# Patient Record
Sex: MALE | Race: WHITE | NOT HISPANIC OR LATINO | Employment: FULL TIME | URBAN - METROPOLITAN AREA
[De-identification: names, ages, dates, MRNs, and addresses within clinical notes are randomized per-mention and may not be internally consistent; named-entity substitution may affect disease eponyms.]

---

## 2017-01-13 ENCOUNTER — GENERIC CONVERSION - ENCOUNTER (OUTPATIENT)
Dept: OTHER | Facility: OTHER | Age: 52
End: 2017-01-13

## 2017-01-20 ENCOUNTER — ALLSCRIPTS OFFICE VISIT (OUTPATIENT)
Dept: OTHER | Facility: OTHER | Age: 52
End: 2017-01-20

## 2017-01-24 ENCOUNTER — GENERIC CONVERSION - ENCOUNTER (OUTPATIENT)
Dept: OTHER | Facility: OTHER | Age: 52
End: 2017-01-24

## 2017-01-24 ENCOUNTER — LAB CONVERSION - ENCOUNTER (OUTPATIENT)
Dept: OTHER | Facility: OTHER | Age: 52
End: 2017-01-24

## 2017-01-24 LAB
A/G RATIO (HISTORICAL): 1.7 (CALC) (ref 1–2.5)
ALBUMIN SERPL BCP-MCNC: 4.4 G/DL (ref 3.6–5.1)
ALDOLASE (HISTORICAL): 5.3 U/L
ALP SERPL-CCNC: 60 U/L (ref 40–115)
ALT SERPL W P-5'-P-CCNC: 37 U/L (ref 9–46)
AST SERPL W P-5'-P-CCNC: 24 U/L (ref 10–35)
BILIRUB SERPL-MCNC: 0.7 MG/DL (ref 0.2–1.2)
BUN SERPL-MCNC: 20 MG/DL (ref 7–25)
BUN/CREA RATIO (HISTORICAL): ABNORMAL (CALC) (ref 6–22)
CALCIUM SERPL-MCNC: 9.2 MG/DL (ref 8.6–10.3)
CHLORIDE SERPL-SCNC: 102 MMOL/L (ref 98–110)
CHOLEST SERPL-MCNC: 144 MG/DL (ref 125–200)
CHOLEST/HDLC SERPL: 4.8 (CALC)
CK SERPL-CCNC: 125 U/L (ref 44–196)
CO2 SERPL-SCNC: 27 MMOL/L (ref 20–31)
CREAT SERPL-MCNC: 1.28 MG/DL (ref 0.7–1.33)
EGFR AFRICAN AMERICAN (HISTORICAL): 75 ML/MIN/1.73M2
EGFR-AMERICAN CALC (HISTORICAL): 65 ML/MIN/1.73M2
GAMMA GLOBULIN (HISTORICAL): 2.6 G/DL (CALC) (ref 1.9–3.7)
GLUCOSE (HISTORICAL): 134 MG/DL (ref 65–99)
HBA1C MFR BLD HPLC: 7.8 % OF TOTAL HGB
HDLC SERPL-MCNC: 30 MG/DL
LDL CHOLESTEROL (HISTORICAL): 96 MG/DL (CALC)
NON-HDL-CHOL (CHOL-HDL) (HISTORICAL): 114 MG/DL (CALC)
POTASSIUM SERPL-SCNC: 4 MMOL/L (ref 3.5–5.3)
SODIUM SERPL-SCNC: 139 MMOL/L (ref 135–146)
TOTAL PROTEIN (HISTORICAL): 7 G/DL (ref 6.1–8.1)
TRIGL SERPL-MCNC: 92 MG/DL
TSH SERPL DL<=0.05 MIU/L-ACNC: 5.84 MIU/L (ref 0.4–4.5)

## 2017-01-25 ENCOUNTER — GENERIC CONVERSION - ENCOUNTER (OUTPATIENT)
Dept: OTHER | Facility: OTHER | Age: 52
End: 2017-01-25

## 2017-02-14 ENCOUNTER — ALLSCRIPTS OFFICE VISIT (OUTPATIENT)
Dept: OTHER | Facility: OTHER | Age: 52
End: 2017-02-14

## 2017-02-14 DIAGNOSIS — E04.1 NONTOXIC SINGLE THYROID NODULE: ICD-10-CM

## 2017-03-06 ENCOUNTER — HOSPITAL ENCOUNTER (OUTPATIENT)
Dept: RADIOLOGY | Facility: HOSPITAL | Age: 52
Discharge: HOME | End: 2017-03-06
Payer: COMMERCIAL

## 2017-03-06 DIAGNOSIS — E04.1 NONTOXIC SINGLE THYROID NODULE: ICD-10-CM

## 2017-03-06 PROCEDURE — 76536 US EXAM OF HEAD AND NECK: CPT

## 2017-03-07 ENCOUNTER — GENERIC CONVERSION - ENCOUNTER (OUTPATIENT)
Dept: OTHER | Facility: OTHER | Age: 52
End: 2017-03-07

## 2017-03-17 ENCOUNTER — GENERIC CONVERSION - ENCOUNTER (OUTPATIENT)
Dept: OTHER | Facility: OTHER | Age: 52
End: 2017-03-17

## 2017-03-17 LAB — TSH SERPL DL<=0.05 MIU/L-ACNC: 1.21 MIU/L (ref 0.4–4.5)

## 2017-04-06 ENCOUNTER — ALLSCRIPTS OFFICE VISIT (OUTPATIENT)
Dept: OTHER | Facility: OTHER | Age: 52
End: 2017-04-06

## 2017-04-06 DIAGNOSIS — E11.65 TYPE 2 DIABETES MELLITUS WITH HYPERGLYCEMIA (HCC): ICD-10-CM

## 2017-04-10 ENCOUNTER — GENERIC CONVERSION - ENCOUNTER (OUTPATIENT)
Dept: OTHER | Facility: OTHER | Age: 52
End: 2017-04-10

## 2017-04-20 ENCOUNTER — GENERIC CONVERSION - ENCOUNTER (OUTPATIENT)
Dept: OTHER | Facility: OTHER | Age: 52
End: 2017-04-20

## 2017-05-14 DIAGNOSIS — E11.65 TYPE 2 DIABETES MELLITUS WITH HYPERGLYCEMIA (HCC): ICD-10-CM

## 2017-05-17 ENCOUNTER — GENERIC CONVERSION - ENCOUNTER (OUTPATIENT)
Dept: OTHER | Facility: OTHER | Age: 52
End: 2017-05-17

## 2017-05-24 ENCOUNTER — ALLSCRIPTS OFFICE VISIT (OUTPATIENT)
Dept: OTHER | Facility: OTHER | Age: 52
End: 2017-05-24

## 2017-05-26 ENCOUNTER — ALLSCRIPTS OFFICE VISIT (OUTPATIENT)
Dept: OTHER | Facility: OTHER | Age: 52
End: 2017-05-26

## 2017-05-30 ENCOUNTER — LAB CONVERSION - ENCOUNTER (OUTPATIENT)
Dept: OTHER | Facility: OTHER | Age: 52
End: 2017-05-30

## 2017-05-30 ENCOUNTER — GENERIC CONVERSION - ENCOUNTER (OUTPATIENT)
Dept: OTHER | Facility: OTHER | Age: 52
End: 2017-05-30

## 2017-05-30 LAB
A/G RATIO (HISTORICAL): 1.9 (CALC) (ref 1–2.5)
ALBUMIN SERPL BCP-MCNC: 4.5 G/DL (ref 3.6–5.1)
ALP SERPL-CCNC: 73 U/L (ref 40–115)
ALT SERPL W P-5'-P-CCNC: 30 U/L (ref 9–46)
AST SERPL W P-5'-P-CCNC: 20 U/L (ref 10–35)
BILIRUB SERPL-MCNC: 0.5 MG/DL (ref 0.2–1.2)
BUN SERPL-MCNC: 15 MG/DL (ref 7–25)
BUN/CREA RATIO (HISTORICAL): ABNORMAL (CALC) (ref 6–22)
CALCIUM SERPL-MCNC: 9.4 MG/DL (ref 8.6–10.3)
CHLORIDE SERPL-SCNC: 104 MMOL/L (ref 98–110)
CHOLEST SERPL-MCNC: 132 MG/DL (ref 125–200)
CHOLEST/HDLC SERPL: 3.7 (CALC)
CO2 SERPL-SCNC: 29 MMOL/L (ref 20–31)
CREAT SERPL-MCNC: 1.03 MG/DL (ref 0.7–1.33)
CREATININE, RANDOM URINE (HISTORICAL): 128 MG/DL (ref 20–370)
EGFR AFRICAN AMERICAN (HISTORICAL): 98 ML/MIN/1.73M2
EGFR-AMERICAN CALC (HISTORICAL): 84 ML/MIN/1.73M2
GAMMA GLOBULIN (HISTORICAL): 2.4 G/DL (CALC) (ref 1.9–3.7)
GLUCOSE (HISTORICAL): 112 MG/DL (ref 65–99)
HBA1C MFR BLD HPLC: 5.7 % OF TOTAL HGB
HDLC SERPL-MCNC: 36 MG/DL
LDL CHOLESTEROL (HISTORICAL): 85 MG/DL (CALC)
MAGNESIUM, UR (HISTORICAL): 0.3 MG/DL
MICROALBUMIN/CREATININE RATIO (HISTORICAL): 2 MCG/MG CREAT
NON-HDL-CHOL (CHOL-HDL) (HISTORICAL): 96 MG/DL (CALC)
POTASSIUM SERPL-SCNC: 4 MMOL/L (ref 3.5–5.3)
SODIUM SERPL-SCNC: 141 MMOL/L (ref 135–146)
TOTAL PROTEIN (HISTORICAL): 6.9 G/DL (ref 6.1–8.1)
TRIGL SERPL-MCNC: 54 MG/DL

## 2017-06-05 ENCOUNTER — GENERIC CONVERSION - ENCOUNTER (OUTPATIENT)
Dept: OTHER | Facility: OTHER | Age: 52
End: 2017-06-05

## 2017-07-06 ENCOUNTER — ALLSCRIPTS OFFICE VISIT (OUTPATIENT)
Dept: OTHER | Facility: OTHER | Age: 52
End: 2017-07-06

## 2017-07-06 DIAGNOSIS — E89.0 POSTPROCEDURAL HYPOTHYROIDISM: ICD-10-CM

## 2017-07-19 ENCOUNTER — GENERIC CONVERSION - ENCOUNTER (OUTPATIENT)
Dept: OTHER | Facility: OTHER | Age: 52
End: 2017-07-19

## 2017-07-19 ENCOUNTER — ANESTHESIA EVENT (OUTPATIENT)
Dept: GASTROENTEROLOGY | Facility: HOSPITAL | Age: 52
End: 2017-07-19
Payer: COMMERCIAL

## 2017-07-19 ENCOUNTER — ANESTHESIA (OUTPATIENT)
Dept: GASTROENTEROLOGY | Facility: HOSPITAL | Age: 52
End: 2017-07-19
Payer: COMMERCIAL

## 2017-07-19 ENCOUNTER — HOSPITAL ENCOUNTER (OUTPATIENT)
Facility: HOSPITAL | Age: 52
Setting detail: OUTPATIENT SURGERY
Discharge: HOME | End: 2017-07-19
Attending: INTERNAL MEDICINE | Admitting: INTERNAL MEDICINE
Payer: COMMERCIAL

## 2017-07-19 VITALS
HEIGHT: 74 IN | OXYGEN SATURATION: 92 % | TEMPERATURE: 97.5 F | WEIGHT: 259 LBS | DIASTOLIC BLOOD PRESSURE: 66 MMHG | SYSTOLIC BLOOD PRESSURE: 109 MMHG | BODY MASS INDEX: 33.24 KG/M2 | RESPIRATION RATE: 18 BRPM | HEART RATE: 68 BPM

## 2017-07-19 DIAGNOSIS — Z12.11 ENCOUNTER FOR SCREENING FOR MALIGNANT NEOPLASM OF COLON: ICD-10-CM

## 2017-07-19 PROCEDURE — 88305 TISSUE EXAM BY PATHOLOGIST: CPT | Performed by: INTERNAL MEDICINE

## 2017-07-19 RX ORDER — PROPOFOL 10 MG/ML
INJECTION, EMULSION INTRAVENOUS AS NEEDED
Status: DISCONTINUED | OUTPATIENT
Start: 2017-07-19 | End: 2017-07-19 | Stop reason: SURG

## 2017-07-19 RX ORDER — GABAPENTIN 100 MG/1
200 CAPSULE ORAL 3 TIMES DAILY
COMMUNITY
End: 2018-03-10 | Stop reason: SDUPTHER

## 2017-07-19 RX ORDER — SODIUM CHLORIDE 9 MG/ML
INJECTION, SOLUTION INTRAVENOUS CONTINUOUS PRN
Status: DISCONTINUED | OUTPATIENT
Start: 2017-07-19 | End: 2017-07-19 | Stop reason: SURG

## 2017-07-19 RX ORDER — ROSUVASTATIN CALCIUM 5 MG/1
5 TABLET, COATED ORAL DAILY
COMMUNITY
End: 2018-02-21 | Stop reason: SDUPTHER

## 2017-07-19 RX ORDER — RIZATRIPTAN BENZOATE 10 MG/1
10 TABLET ORAL ONCE AS NEEDED
COMMUNITY
End: 2020-02-12 | Stop reason: SDUPTHER

## 2017-07-19 RX ORDER — LEVOTHYROXINE SODIUM 0.12 MG/1
125 TABLET ORAL DAILY
COMMUNITY
End: 2018-02-21 | Stop reason: SDUPTHER

## 2017-07-19 RX ORDER — LISINOPRIL 20 MG/1
20 TABLET ORAL DAILY
COMMUNITY
End: 2018-05-09 | Stop reason: SDUPTHER

## 2017-07-19 RX ADMIN — PROPOFOL 30 MG: 10 INJECTION, EMULSION INTRAVENOUS at 08:23

## 2017-07-19 RX ADMIN — PROPOFOL 50 MG: 10 INJECTION, EMULSION INTRAVENOUS at 08:18

## 2017-07-19 RX ADMIN — SODIUM CHLORIDE: 0.9 INJECTION, SOLUTION INTRAVENOUS at 07:27

## 2017-07-19 RX ADMIN — PROPOFOL 30 MG: 10 INJECTION, EMULSION INTRAVENOUS at 08:16

## 2017-07-19 RX ADMIN — PROPOFOL 30 MG: 10 INJECTION, EMULSION INTRAVENOUS at 08:25

## 2017-07-19 RX ADMIN — PROPOFOL 100 MG: 10 INJECTION, EMULSION INTRAVENOUS at 08:15

## 2017-07-19 RX ADMIN — PROPOFOL 50 MG: 10 INJECTION, EMULSION INTRAVENOUS at 08:20

## 2017-07-25 ENCOUNTER — GENERIC CONVERSION - ENCOUNTER (OUTPATIENT)
Dept: OTHER | Facility: OTHER | Age: 52
End: 2017-07-25

## 2017-08-14 ENCOUNTER — ALLSCRIPTS OFFICE VISIT (OUTPATIENT)
Dept: OTHER | Facility: OTHER | Age: 52
End: 2017-08-14

## 2017-08-14 DIAGNOSIS — Z12.5 ENCOUNTER FOR SCREENING FOR MALIGNANT NEOPLASM OF PROSTATE: ICD-10-CM

## 2017-08-14 DIAGNOSIS — R73.9 HYPERGLYCEMIA: ICD-10-CM

## 2017-08-14 DIAGNOSIS — E89.0 POSTPROCEDURAL HYPOTHYROIDISM: ICD-10-CM

## 2017-08-14 DIAGNOSIS — E11.65 TYPE 2 DIABETES MELLITUS WITH HYPERGLYCEMIA (HCC): ICD-10-CM

## 2017-08-14 DIAGNOSIS — I10 ESSENTIAL (PRIMARY) HYPERTENSION: ICD-10-CM

## 2017-08-14 DIAGNOSIS — E78.5 HYPERLIPIDEMIA: ICD-10-CM

## 2017-08-16 ENCOUNTER — LAB CONVERSION - ENCOUNTER (OUTPATIENT)
Dept: OTHER | Facility: OTHER | Age: 52
End: 2017-08-16

## 2017-08-16 ENCOUNTER — GENERIC CONVERSION - ENCOUNTER (OUTPATIENT)
Dept: OTHER | Facility: OTHER | Age: 52
End: 2017-08-16

## 2017-08-16 LAB
A/G RATIO (HISTORICAL): 2 (CALC) (ref 1–2.5)
ALBUMIN SERPL BCP-MCNC: 4.4 G/DL (ref 3.6–5.1)
ALP SERPL-CCNC: 63 U/L (ref 40–115)
ALT SERPL W P-5'-P-CCNC: 31 U/L (ref 9–46)
AST SERPL W P-5'-P-CCNC: 22 U/L (ref 10–35)
BILIRUB SERPL-MCNC: 0.6 MG/DL (ref 0.2–1.2)
BUN SERPL-MCNC: 15 MG/DL (ref 7–25)
BUN/CREA RATIO (HISTORICAL): ABNORMAL (CALC) (ref 6–22)
CALCIUM SERPL-MCNC: 9.5 MG/DL (ref 8.6–10.3)
CHLORIDE SERPL-SCNC: 101 MMOL/L (ref 98–110)
CHOLEST SERPL-MCNC: 143 MG/DL (ref 125–200)
CHOLEST/HDLC SERPL: 4 (CALC)
CO2 SERPL-SCNC: 32 MMOL/L (ref 20–31)
CREAT SERPL-MCNC: 1.1 MG/DL (ref 0.7–1.33)
EGFR AFRICAN AMERICAN (HISTORICAL): 90 ML/MIN/1.73M2
EGFR-AMERICAN CALC (HISTORICAL): 77 ML/MIN/1.73M2
GAMMA GLOBULIN (HISTORICAL): 2.2 G/DL (CALC) (ref 1.9–3.7)
GLUCOSE (HISTORICAL): 103 MG/DL (ref 65–99)
HBA1C MFR BLD HPLC: 5.6 % OF TOTAL HGB
HDLC SERPL-MCNC: 36 MG/DL
LDL CHOLESTEROL (HISTORICAL): 86 MG/DL (CALC)
NON-HDL-CHOL (CHOL-HDL) (HISTORICAL): 107 MG/DL (CALC)
POTASSIUM SERPL-SCNC: 4 MMOL/L (ref 3.5–5.3)
PROSTATE SPECIFIC ANTIGEN TOTAL (HISTORICAL): 1.2 NG/ML
SODIUM SERPL-SCNC: 140 MMOL/L (ref 135–146)
TOTAL PROTEIN (HISTORICAL): 6.6 G/DL (ref 6.1–8.1)
TRIGL SERPL-MCNC: 105 MG/DL

## 2017-08-28 DIAGNOSIS — E11.65 TYPE 2 DIABETES MELLITUS WITH HYPERGLYCEMIA (HCC): ICD-10-CM

## 2017-09-27 ENCOUNTER — ALLSCRIPTS OFFICE VISIT (OUTPATIENT)
Dept: OTHER | Facility: OTHER | Age: 52
End: 2017-09-27

## 2017-09-28 ENCOUNTER — GENERIC CONVERSION - ENCOUNTER (OUTPATIENT)
Dept: OTHER | Facility: OTHER | Age: 52
End: 2017-09-28

## 2017-09-28 DIAGNOSIS — I10 ESSENTIAL (PRIMARY) HYPERTENSION: ICD-10-CM

## 2017-09-28 DIAGNOSIS — E78.5 HYPERLIPIDEMIA: ICD-10-CM

## 2017-09-28 DIAGNOSIS — R73.9 HYPERGLYCEMIA: ICD-10-CM

## 2017-09-28 DIAGNOSIS — E11.65 TYPE 2 DIABETES MELLITUS WITH HYPERGLYCEMIA (HCC): ICD-10-CM

## 2017-10-10 ENCOUNTER — ALLSCRIPTS OFFICE VISIT (OUTPATIENT)
Dept: OTHER | Facility: OTHER | Age: 52
End: 2017-10-10

## 2017-10-17 ENCOUNTER — LAB CONVERSION - ENCOUNTER (OUTPATIENT)
Dept: OTHER | Facility: OTHER | Age: 52
End: 2017-10-17

## 2017-10-17 ENCOUNTER — GENERIC CONVERSION - ENCOUNTER (OUTPATIENT)
Dept: OTHER | Facility: OTHER | Age: 52
End: 2017-10-17

## 2017-10-17 LAB
A/G RATIO (HISTORICAL): 1.9 (CALC) (ref 1–2.5)
ALBUMIN SERPL BCP-MCNC: 4.4 G/DL (ref 3.6–5.1)
ALP SERPL-CCNC: 59 U/L (ref 40–115)
ALT SERPL W P-5'-P-CCNC: 22 U/L (ref 9–46)
AST SERPL W P-5'-P-CCNC: 15 U/L (ref 10–35)
BILIRUB SERPL-MCNC: 0.5 MG/DL (ref 0.2–1.2)
BUN SERPL-MCNC: 18 MG/DL (ref 7–25)
BUN/CREA RATIO (HISTORICAL): ABNORMAL (CALC) (ref 6–22)
CALCIUM SERPL-MCNC: 9.8 MG/DL (ref 8.6–10.3)
CHLORIDE SERPL-SCNC: 104 MMOL/L (ref 98–110)
CO2 SERPL-SCNC: 32 MMOL/L (ref 20–31)
CREAT SERPL-MCNC: 1.16 MG/DL (ref 0.7–1.33)
EGFR AFRICAN AMERICAN (HISTORICAL): 84 ML/MIN/1.73M2
EGFR-AMERICAN CALC (HISTORICAL): 73 ML/MIN/1.73M2
GAMMA GLOBULIN (HISTORICAL): 2.3 G/DL (CALC) (ref 1.9–3.7)
GLUCOSE (HISTORICAL): 110 MG/DL (ref 65–99)
HBA1C MFR BLD HPLC: 5.6 % OF TOTAL HGB
POTASSIUM SERPL-SCNC: 4.1 MMOL/L (ref 3.5–5.3)
SODIUM SERPL-SCNC: 142 MMOL/L (ref 135–146)
T4 FREE SERPL-MCNC: 1.6 NG/DL (ref 0.8–1.8)
TOTAL PROTEIN (HISTORICAL): 6.7 G/DL (ref 6.1–8.1)
TSH SERPL DL<=0.05 MIU/L-ACNC: 0.81 MIU/L (ref 0.4–4.5)

## 2017-10-27 NOTE — PROGRESS NOTES
Assessment  1  Depression (311) (F32 9)   2  Diabetes mellitus type 2, uncontrolled (250 02) (E11 65)   3  Anxiety (300 00) (F41 9)   4  Hypothyroidism, postradioiodine therapy (244 1) (E89 0)   5  Hypertension (401 9) (I10)    Assessment and plan #1 depression no suicidal ideation patient declines to see counsellor Mary Salazar I will start the patient on Zoloft 25 mg once a day; I reviewed the rest benefits and side effects of the medication  #2 type 2 diabetes recommend a healthy balanced diet reducing carbohydrates and sweets, routine exercise I have reviewed his laboratories #3 hypothyroidism currently stable working with endocrinology #5 hypertension recommended weight loss, walking continue with the current medical regimen and we'll continue to monitor RTO in 4-6 weeks call for any problems  Plan  Depression    · Sertraline HCl - 25 MG Oral Tablet (Zoloft); TAKE 1 TABLET DAILY  Diabetes mellitus type 2, uncontrolled    · (1) T4, FREE; Status:Active; Requested for:31Jow0107;    · (Q) TSH, 3RD GENERATION; Status:Active; Requested for:45Vdj0297;   Diabetes mellitus type 2, uncontrolled, Elevated blood sugar    · (1) HEMOGLOBIN A1C; Status:Active; Requested for:19Jew4355;   Diabetes mellitus type 2, uncontrolled, Hyperlipidemia, Hypertension    · (1) COMPREHENSIVE METABOLIC PANEL; Status:Active; Requested for:90Hwf9659;   Need for prophylactic vaccination and inoculation against influenza    · Fluzone Quadrivalent 0 5 ML Intramuscular Suspension Prefilled Syringe    History of Present Illness  HPI: 81-year-old male is coming in for a follow-up examination of hyperlipidemia, hypertension, thyroid nodule, viral bronchitis new problem depression for evaluation today; the patient reports to me no hospitalizations, no ER visits he does report cold symptoms are improving, coughing up clear mucus which has improved over last week no fever, no chills  Patient reports any complaints taking his medications   He reports me feeling down and depressed he reports me over the last 4 months don't care , all wants to do is sleep , decreased motivation , problems at home , financial and work like a rain cloud wouldn't care if wasn?t here no si He reports to me that he would never harm himself and he has no plan to harm himself, he reports me he would never do this to his family      Review of Systems  Complete-Male:   Constitutional: No fever or chills, feels well, no tiredness, no recent weight gain or weight loss  Cardiovascular: No complaints of slow heart rate, no fast heart rate, no chest pain, no palpitations, no leg claudication, no lower extremity  Respiratory: No complaints of shortness of breath, no wheezing, no cough, no SOB on exertion, no orthopnea or PND  Gastrointestinal: No complaints of abdominal pain, no constipation, no nausea or vomiting, no diarrhea or bloody stools  Genitourinary: No complaints of dysuria, no incontinence, no hesitancy, no nocturia, no genital lesion, no testicular pain  Psychiatric: depression, but-- not suicidal    ROS Reviewed:   ROS reviewed  Active Problems  1  Abdominal discomfort (789 00) (R10 9)   2  Abdominal pain (789 00) (R10 9)   3  Abdominal wall hernia (553 20) (K43 9)   4  Abnormal ECG (794 31) (R94 31)   5  Abnormal weight loss (783 21) (R63 4)   6  Allergic rhinitis (477 9) (J30 9)   7  Anxiety (300 00) (F41 9)   8  Atypical chest pain (786 59) (R07 89)   9  Bilateral leg edema (782 3) (R60 0)   10  Bloating (787 3) (R14 0)   11  Change in bowel habits (787 99) (R19 4)   12  Cholelithiasis (574 20) (K80 20)   13  Colon cancer screening (V76 51) (Z12 11)   14  Colon polyps (211 3) (K63 5)   15  Diabetes mellitus type 2, uncontrolled (250 02) (E11 65)   16  Elevated blood sugar (790 29) (R73 9)   17  Encounter for prostate cancer screening (V76 44) (Z12 5)   18  Fatigue (780 79) (R53 83)   19  First degree AV block (426 11) (I44 0)   20   Flatus (787 3) (R14 3) 21  Flu vaccine need (V04 81) (Z23)   22  Gall bladder polyp (575 6) (K82 4)   23  Gallbladder disease (575 9) (K82 9)   24  Gallstone Ileus (560 31)   25  Headache (784 0) (R51)   26  Hernia (553 9) (K46 9)   27  Hyperlipidemia (272 4) (E78 5)   28  Hypertension (401 9) (I10)   29  Hypothyroidism, postradioiodine therapy (244 1) (E89 0)   30  Incomplete emptying of bladder (788 21) (R33 9)   31  Insomnia (780 52) (G47 00)   32  Irregular bowel habits (569 89) (R19 8)   33  Lump of skin (782 2) (R22 9)   34  Migraines (346 90) (G43 909)   35  Myalgia (729 1) (M79 1)   36  Need for 23-polyvalent pneumococcal polysaccharide vaccine (V03 82) (Z23)   37  Obesity (278 00) (E66 9)   38  Obstructive sleep apnea (327 23) (G47 33)   39  Pharyngitis (462) (J02 9)   40  Post-void dribbling (788 35) (N39 43)   41  Preop examination (V72 84) (Z01 818)   42  Prolonged Q-T interval on ECG (794 31) (R94 31)   43  Restless leg syndrome (333 94) (G25 81)   44  Shortness of breath (786 05) (R06 02)   45  Sinus bradycardia (427 89) (R00 1)   46  Tachycardia (785 0) (R00 0)   47  Thyroid nodule (241 0) (E04 1)   48  Thyromegaly (240 9) (E04 9)   49  Trapezius muscle spasm (728 85) (M62 838)   50  Umbilical hernia (894 5) (K42 9)   51  Urine abnormality (791 9) (R82 90)   52  Venous insufficiency (459 81) (I87 2)   53  Viral bronchitis (466 0) (J20 8)   54  Wheezing (786 07) (R06 2)    Past Medical History  1  History of hyperglycemia (V12 29) (Z86 39)   2  History of hyperthyroidism (V12 29) (Z86 39)   3  History of thyroid disease (V12 29) (Z86 39)   4  History of Hyperthyroidism (242 90) (E05 90)   5  History of Irregular heart beat (427 9) (I49 9)   6  History of Venous insufficiency (459 81) (I87 2)   7  History of Wheezing (786 07) (R06 2)  Active Problems And Past Medical History Reviewed: The active problems and past medical history were reviewed and updated today  Surgical History  1   History of Anastomosis Of Gallbladder   2  History of Hernia Repair   3  History of Surgery Vas Deferens Vasectomy  Surgical History Reviewed: The surgical history was reviewed and updated today  Family History  Mother    1  Denied: Family history of Colon cancer   2  Denied: Family history of colonic polyps   3  Denied: Family history of liver disease   4  Family history of lung cancer (V16 1) (Z80 1)   5  Family history of Lung Cancer (V16 1)  Father    6  Denied: Family history of Colon cancer   7  Denied: Family history of colonic polyps   8  Denied: Family history of liver disease  Family History    9  Family history of Family Health Status Of Father - Alive   8  Family history of Family Health Status Of Mother -    6  Family history of Prostate Cancer (R05 89)  Family History Reviewed: The family history was reviewed and updated today  Social History   · Denied: History of Being A Social Drinker   · Caffeine Use   · Denied: History of Drug Use   · Job Physical Requirements Heavy Lifting   · Non-smoker (V49 89) (Z78 9)   · Occasional alcohol use   · Work-related Stress (V62 1)  Social History Reviewed: The social history was reviewed and updated today  The social history was reviewed and is unchanged  Current Meds   1  FreeStyle Lancets Miscellaneous; Use once a day; Therapy: 38AMF1389 to (Last Rx:2017)  Requested for: 2017 Ordered   2  FreeStyle Lite Test In Vitro Strip; TEST ONCE DAILY; Therapy: 40HEJ8672 to (Evaluate:2018)  Requested for: 2017; Last Rx:2017   Ordered   3  Gabapentin 100 MG Oral Capsule; 1-2 capsules by mouth at bedtime; Therapy: 83JIG5515 to (Evaluate:2018)  Requested for: 2017; Last Rx:2017   Ordered   4  Levothyroxine Sodium 125 MCG Oral Tablet; TAKE 1 TABLET DAILY; Therapy: 51UTQ3991 to (Evaluate:2018)  Requested for: 68XPU2971; Last Rx:2017   Ordered   5  Lisinopril 20 MG Oral Tablet; Take 1 tablet daily;    Therapy: 29QFQ1194 to (Evaluate:12Yrn0145)  Requested for: 62Drs5879; Last Rx:10Kic0090   Ordered   6  MetFORMIN HCl - 1000 MG Oral Tablet; take 1 tablet by mouth twice a day; Therapy: 94TNE2520 to  Requested for: 11FBM3564 Recorded   7  MetFORMIN HCl ER (OSM) 500 MG Oral Tablet Extended Release 24 Hour; TAKE 2 TABLETS BY   MOUTH TWO TIMES DAILY; Therapy: 03FAX3555 to (Evaluate:05Jun2017)  Requested for: 97FHO4945; Last Rx:09Vfn2814;   Status: ACTIVE - Renewal Voided Ordered   8  Rizatriptan Benzoate 10 MG Oral Tablet Disintegrating; TAKE 1 TABLET AT ONSET OF HEADACHE  MAY REPEAT EVERY 2 HOURS AS NEEDED  MAXIMUM 3 TABLETS IN 24 HOURS; Therapy: 39ZNZ1492 to (Evaluate:05Jan2017)  Requested for: 60UGO8082; Last Rx:11Jan2016   Ordered   9  Rosuvastatin Calcium 5 MG Oral Tablet; TAKE 1 TABLET BY MOUTH     DAILY; Therapy: 99FAO7586 to (Evaluate:71Yas9847)  Requested for: 94Stx9426; Last Rx:47Bej0034   Ordered   10  Suprep Bowel Prep Kit 17 5-3 13-1 6 GM/180ML Oral Solution; USE AS DIRECTED; Therapy: 22ZMV0514 to (Last Rx:32Bxx2929)  Requested for: 21BPG8309 Ordered  Medication List Reviewed: The medication list was reviewed and updated today  Allergies  1  No Known Drug Allergies  2  Seasonal    Vitals  Vital Signs    Recorded: 18AWA8318 05:47PM   Temperature 98 4 F, Oral   Heart Rate 68   Respiration 16   Systolic 930   Diastolic 88   Height 6 ft 2 in   Weight 266 lb 0 8 oz   BMI Calculated 34 16   BSA Calculated 2 45   O2 Saturation 96     Physical Exam    Constitutional   General appearance: No acute distress, well appearing and well nourished  Eyes   Conjunctiva and lids: No swelling, erythema, or discharge  Pupils and irises: Equal, round and reactive to light  Ears, Nose, Mouth, and Throat   External inspection of ears and nose: Normal     Otoscopic examination: Tympanic membrance translucent with normal light reflex  Canals patent without erythema      Nasal mucosa, septum, and turbinates: Normal without edema or erythema  Oropharynx: Normal with no erythema, edema, exudate or lesions  Pulmonary   Respiratory effort: No increased work of breathing or signs of respiratory distress  Auscultation of lungs: Clear to auscultation, equal breath sounds bilaterally, no wheezes, no rales, no rhonci  Cardiovascular   Auscultation of heart: Normal rate and rhythm, normal S1 and S2, without murmurs  Examination of extremities for edema and/or varicosities: Normal     Abdomen   Abdomen: Non-tender, no masses  Liver and spleen: No hepatomegaly or splenomegaly  Lymphatic   Palpation of lymph nodes in neck: No lymphadenopathy  Psychiatric   Mood and affect: Normal   Mood and Affect: anxious-- and-- depressed  Health Management  Colon polyps   COLONOSCOPY (GI, SURG); every 3 years; Last 53ZOY8278; Next Due: 79Qpc0177;  Active    Future Appointments    Date/Time Provider Specialty Site   02/21/2018 04:30 PM Jia Ruvalcaba DO Internal Medicine [facility]   10/10/2017 04:45 PM Tabby Andres, [address] [facility] ENDOCRINOLOGY     Signatures   Electronically signed by : Stuart Adams DO; Oct  1 2017  5:44PM EST                       (Author)

## 2017-10-31 NOTE — PROGRESS NOTES
Assessment  1  Diabetes mellitus type 2, uncontrolled (250 02) (E11 65)   2  Hypothyroidism, postradioiodine therapy (244 1) (E89 0)   3  Hypertension (401 9) (I10)   4  Hyperlipidemia (272 4) (E78 5)   5  Thyroid nodule (241 0) (E04 1)    Plan  Diabetes mellitus type 2, uncontrolled    · MetFORMIN HCl ER (OSM) 500 MG Oral Tablet Extended Release 24 Hour   Rx By: Mercy Linton; Dispense: 30 Days ; #:360 Tablet Extended Release 24 Hour; Refill: 1;Diabetes mellitus type 2, uncontrolled; BRISA = N; Sent To: [id number] Last Updated By: Adrienne Lopez; 10/10/2017 4:54:49 PM    Discussion/Summary  Discussion Summary:   1  Type 2 diabetes: Upon review of his blood sugar records he is well-controlled  His most recent hemoglobin A1c was 5 6  He will continue his therapy with metformin and continue to check his blood sugars twice daily at alternating times and forwarding a record to the office in 2 weeks for review and adjustment if necessary  He has lab work ordered by Dr Derek Rojo that he is scheduled to have completed Monday morning  Hypothyroidism: Generally, he feels well and is without symptom/complaints of hypo-or hyperthyroidism  Continue levothyroxine at current dose  Check TSH and free T4  Thyroid nodule: He has a subcentimeter nodule on his latest ultrasound  Will reevaluate an ultrasound in approximately 1 year  Hypertension: He is normotensive in the office today  Continue lisinopril  Hyperlipidemia: Continue Crestor  Counseling Documentation With Imm: The patient was counseled regarding diagnostic results,-- instructions for management,-- risk factor reductions,-- patient and family education,-- risks and benefits of treatment options,-- importance of compliance with treatment  Medication SE Review and Pt Understands Tx: Possible side effects of new medications were reviewed with the patient/guardian today  The treatment plan was reviewed with the patient/guardian   The patient/guardian understands and agrees with the treatment plan      Chief Complaint  Chief Complaint Free Text Note Form: follow up      History of Present Illness  HPI: 60-year-old male presents in the office today for follow-up of type 2 diabetes, hypothyroidism and thyroid nodule  He states he is been diagnosed with type II  diabetes for past several years  He is checking his blood sugars twice daily at alternating times  He states he has had troubles with depression over the past few months and has only been checking his blood sugars sporadically  He was recently evaluated by Dr Shi Mason and started Zoloft approximately 10 days ago  His most recent hemoglobin A1c from August 15, 2017 is 5 6  He denies polyuria, polydipsia and polyphagia  He is currently taking metformin 1000 mg twice daily  He initially had issues with diarrhea associated with metformin which has resolved through taking his metformin with meals  He obtained a diabetic eye exam in May 2017 which was negative retinopathy  He has no complaints about his feet and does not follow podiatry for regular diabetic foot care  his hypothyroidism, he is taking levothyroxine 125 mcg daily consistently and in the proper manner  He has no complaints of hypo-or hyperthyroidism  His most recent TSH from May 26, 2017 is 1 21   his hypertension, he is taking lisinopril 20 mg daily  He denies cough  hyperlipidemia is treated with Crestor 5 mg daily  He denies muscle aches  his thyroid nodule he recently obtained an ultrasound of the thyroid on March 7, 2017 which revealed a subcentimeter hypoechoic nodule or cyst  He denies neck pain/discomfort, dysphagia or dysphonia  Review of Systems  Endo Adult ROS Male Established v2 - [facility]:  Constitutional/General: no recent weight gain,-- no recent weight loss,-- poor energy/fatigue,-- no increased energy level,-- no insomnia/sleep problems,-- no fever-- and-- no feeling weak    Heart: high blood pressure, but-- no chest pain/tightness,-- no rapid/racing heart rate-- and-- no palpitations  Genitourinary - Urinary no frequent urination,-- no excess urination-- and-- no urinating during the night  Eyes: no blurred vision,-- no double vision,-- no bulging eyes,-- no gritty/scratchy eyes-- and-- no excessive tearing  Mouth / Throat: no hoarseness-- and-- no difficulty swallowing  Neck: no lumps,-- no swollen glands,-- no neck pain,-- no neck stiffness-- and-- no enlarged thyroid  Respiratory: no wheezing,-- no asthma-- and-- no persistent cough  Musculoskeletal: no muscle aches/pain,-- no joint aches/pain-- and-- no muscle weakness  Skin & Hair: no dry skin,-- no acne,-- the hair texture was not oily,-- no hair loss-- and-- no excessive hair growth  Gastrointestinal: no constipation,-- no diarrhea,-- no waking at night to drink-- and-- no stomach ache  Neurological: no blackouts,-- no weakness-- and-- no tremors  Genital: no testicular pain,-- no testicular lumps/bumps/mass-- and-- does not perform monthly testicular exam   Endocrine: no feeling hot frequently,-- no feeling cold frequently,-- no shifts between feeling hot and cold,-- no cold hands or feet,-- no excessive sweating,-- thyroid problems,-- blood sugar problems,-- no excessive thirst,-- no excessive hunger,-- no change in shoe size,-- no nausea or vomiting-- and-- no shaky hands  ROS Reviewed:   ROS reviewed  Active Problems  1  Abdominal discomfort (789 00) (R10 9)   2  Abdominal pain (789 00) (R10 9)   3  Abdominal wall hernia (553 20) (K43 9)   4  Abnormal ECG (794 31) (R94 31)   5  Abnormal weight loss (783 21) (R63 4)   6  Allergic rhinitis (477 9) (J30 9)   7  Anxiety (300 00) (F41 9)   8  Atypical chest pain (786 59) (R07 89)   9  Bilateral leg edema (782 3) (R60 0)   10  Bloating (787 3) (R14 0)   11  Change in bowel habits (787 99) (R19 4)   12  Cholelithiasis (574 20) (K80 20)   13  Colon cancer screening (V76 51) (Z12 11)   14  Colon polyps (211 3) (K63 5)   15  Depression (311) (F32 9)   16  Diabetes mellitus type 2, uncontrolled (250 02) (E11 65)   17  Elevated blood sugar (790 29) (R73 9)   18  Encounter for prostate cancer screening (V76 44) (Z12 5)   19  Fatigue (780 79) (R53 83)   20  First degree AV block (426 11) (I44 0)   21  Flatus (787 3) (R14 3)   22  Flu vaccine need (V04 81) (Z23)   23  Gall bladder polyp (575 6) (K82 4)   24  Gallbladder disease (575 9) (K82 9)   25  Gallstone Ileus (560 31)   26  Headache (784 0) (R51)   27  Hernia (553 9) (K46 9)   28  Hyperlipidemia (272 4) (E78 5)   29  Hypertension (401 9) (I10)   30  Hypothyroidism, postradioiodine therapy (244 1) (E89 0)   31  Incomplete emptying of bladder (788 21) (R33 9)   32  Insomnia (780 52) (G47 00)   33  Irregular bowel habits (569 89) (R19 8)   34  Lump of skin (782 2) (R22 9)   35  Migraines (346 90) (G43 909)   36  Myalgia (729 1) (M79 1)   37  Need for 23-polyvalent pneumococcal polysaccharide vaccine (V03 82) (Z23)   38  Need for prophylactic vaccination and inoculation against influenza (V04 81) (Z23)   39  Obesity (278 00) (E66 9)   40  Obstructive sleep apnea (327 23) (G47 33)   41  Pharyngitis (462) (J02 9)   42  Post-void dribbling (788 35) (N39 43)   43  Preop examination (V72 84) (Z01 818)   44  Prolonged Q-T interval on ECG (794 31) (R94 31)   45  Restless leg syndrome (333 94) (G25 81)   46  Shortness of breath (786 05) (R06 02)   47  Sinus bradycardia (427 89) (R00 1)   48  Tachycardia (785 0) (R00 0)   49  Thyroid nodule (241 0) (E04 1)   50  Thyromegaly (240 9) (E04 9)   51  Trapezius muscle spasm (728 85) (M62 838)   52  Umbilical hernia (940 1) (K42 9)   53  Urine abnormality (791 9) (R82 90)   54  Venous insufficiency (459 81) (I87 2)   55  Viral bronchitis (466 0) (J20 8)   56  Wheezing (786 07) (R06 2)    Past Medical History  1  History of hyperglycemia (V12 29) (Z86 39)   2  History of hyperthyroidism (V12 29) (Z86 39)   3   History of thyroid disease (V12 29) (Z86 39) 4  History of Hyperthyroidism (242 90) (E05 90)   5  History of Irregular heart beat (427 9) (I49 9)   6  History of Venous insufficiency (459 81) (I87 2)   7  History of Wheezing (786 07) (R06 2)  Active Problems And Past Medical History Reviewed: The active problems and past medical history were reviewed and updated today  Surgical History  1  History of Anastomosis Of Gallbladder   2  History of Hernia Repair   3  History of Surgery Vas Deferens Vasectomy  Surgical History Reviewed: The surgical history was reviewed and updated today  Family History  Mother    1  Denied: Family history of Colon cancer   2  Denied: Family history of colonic polyps   3  Denied: Family history of liver disease   4  Family history of lung cancer (V16 1) (Z80 1)   5  Family history of Lung Cancer (V16 1)  Father    6  Denied: Family history of Colon cancer   7  Denied: Family history of colonic polyps   8  Denied: Family history of liver disease  Family History    9  Family history of Family Health Status Of Father - Alive   8  Family history of Family Health Status Of Mother -    6  Family history of Prostate Cancer (V08 37)  Family History Reviewed: The family history was reviewed and updated today  Social History     · Denied: History of Being A Social Drinker   · Caffeine Use   · Denied: History of Drug Use   · Job Physical Requirements Heavy Lifting   · Non-smoker (V49 89) (Z78 9)   · Occasional alcohol use   · Work-related Stress (V62 1)  Social History Reviewed: The social history was reviewed and updated today  Current Meds   1  FreeStyle Lancets Miscellaneous; Use once a day; Therapy: 43UJR2639 to (Last Rx:2017)  Requested for: 2017 Ordered   2  FreeStyle Lite Test In Vitro Strip; TEST ONCE DAILY; Therapy: 46ENO4703 to (Evaluate:2018)  Requested for: 2017; Last Rx:2017 Ordered   3  Gabapentin 100 MG Oral Capsule; 1-2 capsules by mouth at bedtime;  Therapy: 34KGP6711 to (Evaluate:17Mar2018)  Requested for: 22Mar2017; Last Rx:22Mar2017 Ordered   4  Levothyroxine Sodium 125 MCG Oral Tablet; TAKE 1 TABLET DAILY; Therapy: 09IBW5489 to (Evaluate:17Mar2018)  Requested for: 79IZS6078; Last Rx:22Mar2017 Ordered   5  Lisinopril 20 MG Oral Tablet; Take 1 tablet daily; Therapy: 99KLS9676 to (Evaluate:16Aug2018)  Requested for: 08Vsj7157; Last Rx:99Xnf0157 Ordered   6  MetFORMIN HCl - 1000 MG Oral Tablet; take 1 tablet by mouth twice a day; Therapy: 30POI5497 to  Requested for: 72NUZ6910 Recorded   7  MetFORMIN HCl ER (OSM) 500 MG Oral Tablet Extended Release 24 Hour; TAKE 2 TABLETS BY MOUTH TWO TIMES DAILY; Therapy: 35AGD1904 to (Evaluate:05Jun2017)  Requested for: 87OXX2578; Last Rx:06Apr2017; Status: ACTIVE - Renewal Voided Ordered   8  Rizatriptan Benzoate 10 MG Oral Tablet Disintegrating; TAKE 1 TABLET AT ONSET OF HEADACHE  MAY REPEAT EVERY 2 HOURS AS NEEDED  MAXIMUM 3 TABLETS IN 24 HOURS; Therapy: 45IWW6391 to (Evaluate:05Jan2017)  Requested for: 38CUG4279; Last Rx:11Jan2016 Ordered   9  Rosuvastatin Calcium 5 MG Oral Tablet; TAKE 1 TABLET BY MOUTH     DAILY; Therapy: 89JTH8337 to (Evaluate:14May2018)  Requested for: 17Aug2017; Last Rx:17Aug2017 Ordered   10  Sertraline HCl - 25 MG Oral Tablet; TAKE 1 TABLET DAILY; Therapy[facility] 92MWH8846 [facility]  Requested for: 34URG1414; Last  Rx:68Fct6891 Ordered   11  Suprep Bowel Prep Kit 17 5-3 13-1 6 GM/180ML Oral Solution; USE AS DIRECTED; Therapy: 26MWV9378 to (Last Rx:32Wvj6149)  Requested for: 80PYC3671 Ordered  Medication List Reviewed: The medication list was reviewed and updated today  Allergies  1  No Known Drug Allergies  2   Seasonal    Vitals  Vital Signs    Recorded: 16XBM8781 04:53PM   Heart Rate 64   Systolic 862   Diastolic 98   Height 6 ft 2 in   Weight 266 lb 7 04 oz   BMI Calculated 34 21   BSA Calculated 2 45       Physical Exam   Constitutional  General appearance: No acute distress, well appearing and well nourished  -- obese  Eyes  Conjunctiva and lids: No swelling, erythema, or discharge  Pupils: Equal, round and reactive to light  The sclera are anicteric  Extraocular movements are intact  Ears, Nose, Mouth, and Throat  External inspection of ears, nose and lips: Normal    Oropharynx: Normal with no erythema, edema, exudate or lesions  Exam of Head: The head is atraumatic and normocephalic  Neck: The neck is supple  The thyroid is normal in size with no palpable nodules  Pulmonary  Respiratory effort: No increased work of breathing or signs of respiratory distress  Auscultation of lungs: Clear to auscultation bilaterally with normal chest expansion  Cardiovascular  Auscultation of heart: Normal rate and rhythm with no murmurs, gallops or rubs  Examination of extremities for edema and/or varicosities: Normal    Examination of pulses: Dorsalis pedal pulses are +2 and equal bilaterally  Examination of Carotids: No bruits  Abdomen  Abdomen: Abdomen is soft, non-tender with normal bowel sounds  Liver and spleen: No hepatomegaly or splenomegaly  Lymphatic  Palpation of lymph nodes: No supraclavicular or suboccipital lymphadenopathy  Musculoskeletal  Digits and nails: Normal without clubbing or cyanosis  Inspection/palpation of joints, bones, and muscles: Muscle bulk and tone is normal    Skin  Skin and subcutaneous tissue: Abnormal  -- Dry skin  Neurologic  Cranial nerves: Cranial nerves 2-12 intact  Reflexes: 2+ and symmetric  Sensation: No sensory loss  Motor Strength: Strength is 5/5 bilaterally  Psychiatric  Orientation to person, place and time: Normal    Mood and affect: Affect and attention span are normal      Socks and shoes removed, Right Foot Findings: normal foot, no swelling, no erythema  The right toes were normal-- and-- had full range of motion  The sensory exam showed normal vibratory sensation at the level of the toes on the right   Normal tactile sensation with monofilament testing throughout the right foot  Socks and shoes removed, Left Foot Findings: normal foot, no swelling, no erythema  The left toes were normal-- and-- had full range of motion  The sensory exam showed normal vibratory sensation at the level of the toes on the left  Normal tactile sensation with monofilament testing throughout the left foot  Pulses:  2+ in the dorsalis pedis on the right  Pulses:  2+ in the dorsalis pedis on the left  Results/Data  (Q) LIPID PANEL WITH REFLEX TO DIRECT LDL 90CPQ1227 06:38AM Batsheva Kimble     Test Name Result Flag Reference   CHOLESTEROL, TOTAL 143 mg/dL  125-200   HDL CHOLESTEROL 36 mg/dL L > OR = 40   TRIGLICERIDES 591 mg/dL  <150   LDL-CHOLESTEROL 86 mg/dL (calc)  <130     Desirable range <100 mg/dL for patients with CHD or diabetes and <70 mg/dL for diabetic patients with known heart disease  CHOL/HDLC RATIO 4 0 (calc)  < OR = 5 0   NON HDL CHOLESTEROL 107 mg/dL (calc)       Target for non-HDL cholesterol is 30 mg/dL higher than  LDL cholesterol target  (1) COMPREHENSIVE METABOLIC PANEL 99VNZ7843 12:21OU Batsheva Kimble     Test Name Result Flag Reference   GLUCOSE 103 mg/dL H 65-99     Fasting reference interval   For someone without known diabetes, a glucose value between 100 and 125 mg/dL is consistent with prediabetes and should be confirmed with a follow-up test    UREA NITROGEN (BUN) 15 mg/dL  7-25   CREATININE 1 10 mg/dL  0 70-1 33     For patients >52years of age, the reference limit for Creatinine is approximately 13% higher for people identified as -American  eGFR NON-AFR   AMERICAN 77 mL/min/1 73m2  > OR = 60   eGFR AFRICAN AMERICAN 90 mL/min/1 73m2  > OR = 60   BUN/CREATININE RATIO   9-49   NOT APPLICABLE (calc)   SODIUM 140 mmol/L  135-146   POTASSIUM 4 0 mmol/L  3 5-5 3   CHLORIDE 101 mmol/L     CARBON DIOXIDE 32 mmol/L H 20-31   CALCIUM 9 5 mg/dL  8 6-10 3   PROTEIN, TOTAL 6 6 g/dL  6 1-8 1   ALBUMIN 4 4 g/dL 3  6-5 1   GLOBULIN 2 2 g/dL (calc)  1 9-3 7   ALBUMIN/GLOBULIN RATIO 2 0 (calc)  1 0-2 5   BILIRUBIN, TOTAL 0 6 mg/dL  0 2-1 2   ALKALINE PHOSPHATASE 63 U/L     AST 22 U/L  10-35   ALT 31 U/L  9-46     (1) PSA (SCREEN) (Dx V76 44 Screen for Prostate Cancer) 83MTG9595 06:38AM Antonio Smith     Test Name Result Flag Reference   PSA, TOTAL 1 2 ng/mL  < OR = 4 0     The total PSA value from this assay system is  standardized against the WHO standard  The test  result will be approximately 20% lower when compared  to the equimolar-standardized total PSA (Werner  Upton)  Comparison of serial PSA results should be  interpreted with this fact in mind  This test was performed using the Siemens  chemiluminescent method  Values obtained from  different assay methods cannot be used interchangeably  PSA levels, regardless of value, should not be interpreted as absolute evidence of the presence or absence of disease  (Q) HEMOGLOBIN A1c 61Vwo3633 06:38AM Antonio Smith     REPORT COMMENT: FASTING:YES     Test Name Result Flag Reference   HEMOGLOBIN A1c 5 6 % of total Hgb  <5 7     For the purpose of screening for the presence of diabetes:   <5 7%       Consistent with the absence of diabetes 5 7-6 4%    Consistent with increased risk for diabetes             (prediabetes) > or =6 5%  Consistent with diabetes   This assay result is consistent with a decreased risk of diabetes  Currently, no consensus exists regarding use of hemoglobin A1c for diagnosis of diabetes in children  According to American Diabetes Association (ADA) guidelines, hemoglobin A1c <7 0% represents optimal control in non-pregnant diabetic patients  Different metrics may apply to specific patient populations  Standards of Medical Care in Diabetes(ADA)  Health Management  Colon polyps   COLONOSCOPY (GI, SURG); every 3 years; Last 76FNK9496; Next Due: 26Qsz2565;  Active    Future Appointments    Date/Time Provider Specialty Site   01/10/2018 05:15 PM Tarun Cid, [address] Endocrinology [facility] ENDOCRINOLOGY   02/21/2018 04:30 PM Karina Temple DO Internal Medicine [facility]       Signatures   Electronically signed by : Chapincito Gregory; Oct 10 2017  5:23PM EST                       (Author)    Electronically signed by : KEN Hidalgo ; Oct 11 2017 12:30PM EST

## 2017-11-29 DIAGNOSIS — E04.1 NONTOXIC SINGLE THYROID NODULE: ICD-10-CM

## 2018-01-09 NOTE — RESULT NOTES
Message   Notify the patient mild elevation of the TSH; please let me know the current dose of levothyroxine        Verified Results  (1) LIPID PANEL, FASTING 29Jun2016 08:59AM Libby [employer]     Test Name Result Flag Reference   CHOLESTEROL, TOTAL 238 mg/dL H 125-200   HDL CHOLESTEROL 36 mg/dL L > OR = 40   TRIGLICERIDES 234 mg/dL  <150   LDL-CHOLESTEROL 179 mg/dL (calc) H <130   Desirable range <100 mg/dL for patients with CHD or  diabetes and <70 mg/dL for diabetic patients with  known heart disease  CHOL/HDLC RATIO 6 6 (calc) H < OR = 5 0   NON HDL CHOLESTEROL 202 mg/dL (calc) H    Target for non-HDL cholesterol is 30 mg/dL higher than   LDL cholesterol target  (1) COMPREHENSIVE METABOLIC PANEL 02VES1820 24:98EK Libby [employer]     Test Name Result Flag Reference   GLUCOSE 121 mg/dL H 65-99   Fasting reference interval   UREA NITROGEN (BUN) 17 mg/dL  7-25   CREATININE 1 17 mg/dL  0 70-1 33   For patients >52years of age, the reference limit  for Creatinine is approximately 13% higher for people  identified as -American  eGFR NON-AFR   AMERICAN 72 mL/min/1 73m2  > OR = 60   eGFR AFRICAN AMERICAN 84 mL/min/1 73m2  > OR = 60   BUN/CREATININE RATIO   0-23   NOT APPLICABLE (calc)   SODIUM 140 mmol/L  135-146   POTASSIUM 3 9 mmol/L  3 5-5 3   CHLORIDE 103 mmol/L     CARBON DIOXIDE 27 mmol/L  19-30   CALCIUM 9 2 mg/dL  8 6-10 3   PROTEIN, TOTAL 6 7 g/dL  6 1-8 1   ALBUMIN 4 4 g/dL  3 6-5 1   GLOBULIN 2 3 g/dL (calc)  1 9-3 7   ALBUMIN/GLOBULIN RATIO 1 9 (calc)  1 0-2 5   BILIRUBIN, TOTAL 0 7 mg/dL  0 2-1 2   ALKALINE PHOSPHATASE 57 U/L     AST 17 U/L  10-35   ALT 26 U/L  9-46     (Q) TSH, 3RD GENERATION 20DLM0001 08:59AM Libby [employer]   REPORT COMMENT:  FASTING:YES     Test Name Result Flag Reference   TSH 5 87 mIU/L H 0 40-4 50       Signatures   Electronically signed by : Riki Levine DO; Jun 30 2016  4:21PM EST                       (Author)

## 2018-01-09 NOTE — RESULT NOTES
Discussion/Summary      Colon polyps removed came back as precancerous tubular adenoma  Next colonoscopy in 3 years  Patient to call our office for any GI symptoms  left message to call back  Reminder set  CS    Discussed with patient  CS             Verified Results  (1) TISSUE EXAM [id number] 08:21AM Caleb Jocelyn     Test Name Result Flag Reference   LAB AP CASE REPORT (Report)     Surgical Pathology Report             Case: [id number]                   Authorizing Provider: Karena Ellis MD     Collected:      07/19/2017 0821        Ordering Location:   [facility]    Received:      07/19/2017 [place] [address] Endoscopy                               Pathologist:      Sveta Fernandez MD                                 Specimens:  A) - Large Intestine, Right/Ascending Colon, Hot snare polyp ascending colon              B) - Polyp, Colorectal, Hot snare splenic flexure polyp   LAB AP FINAL DIAGNOSIS (Report)     A  Ascending colon polyp (hot snare):    - Portions of tubular adenoma  - No high-grade dysplasia or malignancy identified  B  Splenic flexure polyp, colon (hot snare):    - Portions of tubular adenoma  - No high-grade dysplasia or malignancy identified  Electronically signed by Sveta Fernandez MD on 7/24/2017 at 12:18 PM  Preliminary result electronically signed by Sveta Fernandez MD on 7/21/2017 at 1:21 PM   LAB AP NOTE      Interpretation performed at [facility], [address] [place] [address], [address] [place] [address]  LAB AP SURGICAL ADDITIONAL INFORMATION (Report)     All controls performed with the immunohistochemical stains reported above   reacted appropriately  These tests were developed and their performance   characteristics determined by [facility] or   [facility]  They may not be cleared or approved by the [place]  Food and Drug Administration  The FDA has determined that such clearance   or approval is not necessary   These tests are used for clinical purposes  They should not be regarded as investigational or for research  This   laboratory has been approved by CLIA 88, designated as a high-complexity   laboratory and is qualified to perform these tests  LAB AP GROSS DESCRIPTION (Report)     A  The specimen is received in formalin, labeled with the patient's name   and hospital number, and is designated Polyp ascending colon  The   specimen consists of 3 tan soft tissue fragments measuring 0 2, 0 3, and   0 3 cm in greatest dimension  Entire submitted  One cassette  B  The specimen is received in formalin, labeled with the patient's name   and hospital number, and is designated Splenic flexure polyp  The   specimen consists of one soft tissue fragment measuring 0 2 cm in greatest   dimension and 1 tan polyploid tissue fragment measuring 0 5 cm in greatest   dimension  The margin of resection on the polyp is painted with blue ink  Entire submitted  2 cassettes with the polyp bisected in cassette 2  Note: The estimated total formalin fixation time based upon information   provided by the submitting clinician and the standard processing schedule   is 18 0 hours        AEK   LAB AP CLINICAL INFORMATION      Encounter for screening for malignant neoplasm of colon

## 2018-01-10 ENCOUNTER — ALLSCRIPTS OFFICE VISIT (OUTPATIENT)
Dept: OTHER | Facility: OTHER | Age: 53
End: 2018-01-10
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
## 2018-01-10 NOTE — PSYCH
History of Present Illness  Psychotherapy Provided [facility]: Individual Psychotherapy 45 minutes minutes provided today  Goals addressed in session:   Patient having issues with managing stress from work  Has been overeating and eating poorly as a result  Has difficult work schedule and commute  Patient verbal and cooperative  HPI - Psych: Patient denies any depressive symptoms  Denies SI  Admits to overeating in response to stress and maintaining poor diet out of convenience  Has good support system via wife and children  Need to be more active with hobbies and interests away from work   Note   Note:   Reviewed coping strategies to better manage stress and he agrees to begin using free gym at work  Identified specific dietary changes to make nd he agrees to do so  The combination of this should not only help stress issues but improve physical health  He agrees to schedule with me on an as needed basis  Assessment    1   Anxiety (300 00) (F41 9)    Signatures   Electronically signed by : Joanna Huffman LCSW; May 19 2016  7:48PM EST                       (Author)

## 2018-01-11 NOTE — RESULT NOTES
Message   notify the patient normal urine microalbumin level follow up as scheduled        Verified Results  (1) LIPID PANEL, FASTING [id number] 07:39AM [facility]     Test Name Result Flag Reference   CHOLESTEROL, TOTAL 132 mg/dL  125-200   HDL CHOLESTEROL 36 mg/dL L > OR = 40   TRIGLICERIDES 54 mg/dL  <844   LDL-CHOLESTEROL 85 mg/dL (calc)  <130   Desirable range <100 mg/dL for patients with CHD or  diabetes and <70 mg/dL for diabetic patients with  known heart disease  CHOL/HDLC RATIO 3 7 (calc)  < OR = 5 0   NON HDL CHOLESTEROL 96 mg/dL (calc)     Target for non-HDL cholesterol is 30 mg/dL higher than   LDL cholesterol target  (Q) MICROALBUMIN, RANDOM URINE (W/CREATININE) [id number] 07:39AM [facility]     Test Name Result Flag Reference   CREATININE, RANDOM URINE 128 mg/dL     MICROALBUMIN 0 3 mg/dL     Reference Range  Not established   MICROALBUMIN/CREATININE$RATIO, RANDOM URINE 2 mcg/mg creat  <30   The ADA defines abnormalities in albumin  excretion as follows:     Category         Result (mcg/mg creatinine)     Normal                    <30  Microalbuminuria            Clinical albuminuria   > OR = 300     The ADA recommends that at least two of three  specimens collected within a 3-6 month period be  abnormal before considering a patient to be  within a diagnostic category         Signatures   Electronically signed by : Brad Diop DO; May 30 2017  7:33PM EST                       (Author)

## 2018-01-11 NOTE — RESULT NOTES
Verified Results  ECHO COMPLETE WITH CONTRAST IF INDICATED [id number] 07:41AM Gunjan Or     Test Name Result Flag Reference   ECHO COMPLETE WITH CONTRAST IF INDICATED (Report)     [facility] [address] [place] [address]   [phone]     Transthoracic Echocardiogram   2D, M-mode, Doppler, and Color Doppler     Study date: 10-Nov-2016     Patient: Bia Jacobs   MR number: [id number]   Account number: [de-identified]   : 1965   Age: 49 years   Gender: Male   Status: Outpatient   Location: [address] [place] [address] and [facility]   Height: 74 in   Weight: 300 lb   BP: 140/ 92 mmHg     Indications: Chest pain     Diagnoses: R07 9 - Chest pain, unspecified     Sonographer: Lacy Morrow   Primary Physician: Eduard Carney DO   Referring Physician: Nuris Swartz MD   Group: [facility]   Interpreting Physician: Kamila Smith MD     SUMMARY     LEFT VENTRICLE:   Systolic function was normal  Ejection fraction was estimated to be 55 %  There were no regional wall motion abnormalities  The changes were consistent with concentric remodeling (increased wall   thickness with normal wall mass)  HISTORY: PRIOR HISTORY: Hypertension, high cholesterol, tachycardia, sleep   apnea     PROCEDURE: The study was performed in the [address] [facility]  This was a routine study  The transthoracic approach was used  The study   included complete 2D imaging, M-mode, complete spectral Doppler, and color   Doppler  Image quality was adequate  LEFT VENTRICLE: Size was normal  Systolic function was normal  Ejection   fraction was estimated to be 55 %  There were no regional wall motion   abnormalities  Wall thickness was mildly increased  The changes were consistent   with concentric remodeling (increased wall thickness with normal wall mass)     DOPPLER: The ratio of early ventricular filling to atrial contraction   velocities was within the normal range  Left ventricular diastolic function   parameters were normal      RIGHT VENTRICLE: The size was normal  Systolic function was normal      LEFT ATRIUM: Size was within the limits of normal when indexed for body surface   area  RIGHT ATRIUM: Size was normal      MITRAL VALVE: There was mild annular calcification  Valve structure was normal    There was normal leaflet separation  DOPPLER: The transmitral velocity was   within the normal range  There was no evidence for stenosis  There was trace   regurgitation  AORTIC VALVE: The valve was trileaflet  Leaflets exhibited mildly increased   thickness and normal cuspal separation  DOPPLER: There was no evidence for   stenosis  There was no regurgitation  TRICUSPID VALVE: The valve structure was normal  There was normal leaflet   separation  DOPPLER: There was no evidence for stenosis  There was trace   regurgitation  Pulmonary artery systolic pressure was within the normal range  Estimated peak PA pressure was 25 mmHg  PULMONIC VALVE: Leaflets exhibited normal thickness, no calcification, and   normal cuspal separation  DOPPLER: The transpulmonic velocity was within the   normal range  There was mild regurgitation  PERICARDIUM: There was no pericardial effusion  A pericardial fat pad was   present  AORTA: The root exhibited normal size  SYSTEMIC VEINS: IVC: The inferior vena cava was normal in size and course     Respirophasic changes were normal      SYSTEM MEASUREMENT TABLES     2D   %FS: 37 21 %   Ao Diam: 3 69 cm   EDV(Teich): 111 33 ml   EF(Cube): 75 24 %   EF(Teich): 67 02 %   ESV(Cube): 28 63 ml   ESV(Teich): 36 71 ml   IVSd: 1 36 cm   LA Area: 22 29 cm2   LA Diam: 3 88 cm   LVEDV MOD A4C: 149 64 ml   LVEF MOD A4C: 55 69 %   LVESV MOD A4C: 66 31 ml   LVIDd: 4 87 cm   LVIDs: 3 06 cm   LVLd A4C: 9 cm   LVLs A4C: 7 45 cm   LVPWd: 1 29 cm   RA Area: 19 71 cm2   RV Diam : 4 1 cm   SV MOD A4C: 83 33 ml   SV(Cube): 87 03 ml SV(Teich): 74 62 ml     CW   TR Vmax: 2 45 m/s   TR maxP 92 mmHg     MM   TAPSE: 1 67 cm     PW   E': 0 07 m/s   E/E': 11 03   MV A Kevon: 0 64 m/s   MV Dec Alpine: 3 79 m/s2   MV DecT: 217 88 ms   MV E Kevon: 0 83 m/s   MV E/A Ratio: 1 29     [facility]     Prepared and electronically signed by     Brielle Zepeda MD   Signed [id number] 09:58:30     STRESS TEST ONLY, EXERCISE [id number] 07:41AM Nik Elena    Order Number: [id number]    - Patient Instructions: To schedule this appointment, please contact Central Scheduling at [phone]   Order Number: [id number]    - Patient Instructions: To schedule this appointment, please contact Central Scheduling at [phone]  Test Name Result Flag Reference   STRESS TEST ONLY, EXERCISE (Report)     [facility] [address]   [address] [facility], [address] [facility]   [phone]     Stress Electrocardiography during Exercise     Name: Fadi Marino   MR #: [id number]   Account #: [de-identified]   Study date: 11/10/2016   : 1965   Age: 49 years   Gender: Male   Height: 74 in   Weight: 300 lb   BSA: 2 58 m squared     Allergies: ALLERGIES NOT ON FILE     Diagnosis: R94 31 - Abnormal electrocardiogram [ECG] [EKG]     RN: Lawson Rose RN   Primary Physician: Jean Maddox DO   Referring Physician: Milagro Brooks MD   Group: [facility]   Cardiology Fellow: Zackery Elise MD   Report Prepared By[de-identified] Kittie Peabody   Technician: Kittie Peabody   Interpreting Physician: Brielle Zepeda MD     CLINICAL QUESTION: Detection of coronary artery disease  HISTORY: The patient is a 49year old  male  Chest pain status: chest   pain  Other symptoms: dyspnea,Obstructive sleep apnea,abnormal Ekg,   hyperthyroidism,headaches  Coronary artery disease risk factors: dyslipidemia   and hypertension  Co-morbidity: obesity       REST ECG: Normal sinus rhythm at 64 BPM  Normal baseline ECG  PROCEDURE: Treadmill exercise testing was performed, using the Lalita protocol  Stress electrocardiographic evaluation was performed  LALITA PROTOCOL:   HR bpm SBP mmHg DBP mmHg Symptoms   Baseline 66 120 80 none   Stage 1 99 124 78 --   Stage 2 126 180 100 mild dyspnea   Stage 3 153 -- -- --   Immediate 155 198 80 moderate dyspnea, fatigue   Recovery 1 105 184 70 subsiding   Recovery 2 88 138 80 none   Pre 02 Sat 98% and Peak 02 Sat 97%  No medications or fluids given  STRESS SUMMARY: Duration of exercise was 8 min and 20 sec  The patient   exercised to protocol stage 3  Maximal work rate was 10 4 METs  Functional   capacity was normal  Maximal heart rate during stress was 155 bpm ( 91 % of   maximal predicted heart rate)  The heart rate response to stress was normal    There was normal resting blood pressure with an appropriate response to stress  The rate-pressure product for the peak heart rate and blood pressure was 01182  There was no chest pain during stress  The stress test was terminated due to   moderate dyspnea and moderate fatigue  The stress ECG was negative for   ischemia  Arrhythmia during stress: isolated atrial premature beats  SUMMARY:   - Stress results: Duration of exercise was 8 min and 20 sec  Target heart rate   was achieved  There was no chest pain during stress  - ECG conclusions: The stress ECG was negative for ischemia  IMPRESSIONS: Normal study after maximal exercise without reproduction of   symptoms       Prepared and signed by     Marc Ames MD   Signed 11/10/2016 11:14:09

## 2018-01-11 NOTE — RESULT NOTES
Message      Notify the patient home patient of the cholesterol level, elevation of the LDL cholesterol please have the patient follow up with me to discuss the report   Notify the patient elevation of cholesterol level and elevation of the blood sugar level 121 please have the patient follow a low-cholesterol diet and also reduce carbohydrates and sweets in the diet follow-up as scheduled        Verified Results  (1) LIPID PANEL, FASTING [id number] 08:59AM Davina Hodges     Test Name Result Flag Reference   CHOLESTEROL, TOTAL 238 mg/dL H 125-200   HDL CHOLESTEROL 36 mg/dL L > OR = 40   TRIGLICERIDES 865 mg/dL  <150   LDL-CHOLESTEROL 179 mg/dL (calc) H <130   Desirable range <100 mg/dL for patients with CHD or  diabetes and <70 mg/dL for diabetic patients with  known heart disease  CHOL/HDLC RATIO 6 6 (calc) H < OR = 5 0   NON HDL CHOLESTEROL 202 mg/dL (calc) H    Target for non-HDL cholesterol is 30 mg/dL higher than   LDL cholesterol target  (1) COMPREHENSIVE METABOLIC PANEL [id number] 79:75BQ Davina Hodges     Test Name Result Flag Reference   GLUCOSE 121 mg/dL H 65-99   Fasting reference interval   UREA NITROGEN (BUN) 17 mg/dL  7-25   CREATININE 1 17 mg/dL  0 70-1 33   For patients >52years of age, the reference limit  for Creatinine is approximately 13% higher for people  identified as -American  eGFR NON-AFR   AMERICAN 72 mL/min/1 73m2  > OR = 60   eGFR AFRICAN AMERICAN 84 mL/min/1 73m2  > OR = 60   BUN/CREATININE RATIO   3-12   NOT APPLICABLE (calc)   SODIUM 140 mmol/L  135-146   POTASSIUM 3 9 mmol/L  3 5-5 3   CHLORIDE 103 mmol/L     CARBON DIOXIDE 27 mmol/L  19-30   CALCIUM 9 2 mg/dL  8 6-10 3   PROTEIN, TOTAL 6 7 g/dL  6 1-8 1   ALBUMIN 4 4 g/dL  3 6-5 1   GLOBULIN 2 3 g/dL (calc)  1 9-3 7   ALBUMIN/GLOBULIN RATIO 1 9 (calc)  1 0-2 5   BILIRUBIN, TOTAL 0 7 mg/dL  0 2-1 2   ALKALINE PHOSPHATASE 57 U/L     AST 17 U/L  10-35   ALT 26 U/L  9-46       Signatures Electronically signed by : Miki Bustamante DO; Jun 30 2016  4:18PM EST                       (Author)

## 2018-01-12 VITALS
WEIGHT: 266.05 LBS | RESPIRATION RATE: 16 BRPM | BODY MASS INDEX: 34.14 KG/M2 | SYSTOLIC BLOOD PRESSURE: 124 MMHG | TEMPERATURE: 98.4 F | DIASTOLIC BLOOD PRESSURE: 88 MMHG | OXYGEN SATURATION: 96 % | HEIGHT: 74 IN | HEART RATE: 68 BPM

## 2018-01-12 NOTE — RESULT NOTES
Message   notify the patient the  Hemoglobin A1c is at 5 7, excellent control follow-up as scheduled        Verified Results  (1) LIPID PANEL, FASTING [id number] 07:39AM [facility] [employer]     Test Name Result Flag Reference   CHOLESTEROL, TOTAL 132 mg/dL  125-200   HDL CHOLESTEROL 36 mg/dL L > OR = 40   TRIGLICERIDES 54 mg/dL  <840   LDL-CHOLESTEROL 85 mg/dL (calc)  <130   Desirable range <100 mg/dL for patients with CHD or  diabetes and <70 mg/dL for diabetic patients with  known heart disease  CHOL/HDLC RATIO 3 7 (calc)  < OR = 5 0   NON HDL CHOLESTEROL 96 mg/dL (calc)     Target for non-HDL cholesterol is 30 mg/dL higher than   LDL cholesterol target  (Q) MICROALBUMIN, RANDOM URINE (W/CREATININE) [id number] 07:39AM [facility] [employer]     Test Name Result Flag Reference   CREATININE, RANDOM URINE 128 mg/dL     MICROALBUMIN 0 3 mg/dL     Reference Range  Not established   MICROALBUMIN/CREATININE$RATIO, RANDOM URINE 2 mcg/mg creat  <30   The ADA defines abnormalities in albumin  excretion as follows:     Category         Result (mcg/mg creatinine)     Normal                    <30  Microalbuminuria            Clinical albuminuria   > OR = 300     The ADA recommends that at least two of three  specimens collected within a 3-6 month period be  abnormal before considering a patient to be  within a diagnostic category  (1) COMPREHENSIVE METABOLIC PANEL [id number] 09:00EX [facility] [employer]     Test Name Result Flag Reference   GLUCOSE 112 mg/dL H 65-99   Fasting reference interval     For someone without known diabetes, a glucose value  between 100 and 125 mg/dL is consistent with  prediabetes and should be confirmed with a  follow-up test    UREA NITROGEN (BUN) 15 mg/dL  7-25   CREATININE 1 03 mg/dL  0 70-1 33   For patients >52years of age, the reference limit  for Creatinine is approximately 13% higher for people  identified as -American  eGFR NON-AFR   AMERICAN 84 mL/min/1 73m2  > OR = 60   eGFR AFRICAN AMERICAN 98 mL/min/1 73m2  > OR = 60   BUN/CREATININE RATIO   0-61   NOT APPLICABLE (calc)   SODIUM 141 mmol/L  135-146   POTASSIUM 4 0 mmol/L  3 5-5 3   CHLORIDE 104 mmol/L     CARBON DIOXIDE 29 mmol/L  20-31   CALCIUM 9 4 mg/dL  8 6-10 3   PROTEIN, TOTAL 6 9 g/dL  6 1-8 1   ALBUMIN 4 5 g/dL  3 6-5 1   GLOBULIN 2 4 g/dL (calc)  1 9-3 7   ALBUMIN/GLOBULIN RATIO 1 9 (calc)  1 0-2 5   BILIRUBIN, TOTAL 0 5 mg/dL  0 2-1 2   ALKALINE PHOSPHATASE 73 U/L     AST 20 U/L  10-35   ALT 30 U/L  9-46     (Q) HEMOGLOBIN A1c [id number] 07:39AM [facility] [place]   REPORT COMMENT:  FASTING:YES     Test Name Result Flag Reference   HEMOGLOBIN A1c 5 7 % of total Hgb H <5 7   For someone without known diabetes, a hemoglobin   A1c value between 5 7% and 6 4% is consistent with  prediabetes and should be confirmed with a   follow-up test      For someone with known diabetes, a value <7%  indicates that their diabetes is well controlled  A1c  targets should be individualized based on duration of  diabetes, age, comorbid conditions, and other  considerations  This assay result is consistent with an increased risk  of diabetes  Currently, no consensus exists regarding use of  hemoglobin A1c for diagnosis of diabetes for children         Signatures   Electronically signed by : Dellia Gosselin, DO; May 30 2017  7:35PM EST                       (Author)

## 2018-01-12 NOTE — RESULT NOTES
Verified Results  (Q) LIPID PANEL WITH REFLEX TO DIRECT LDL [id number] 06:38AM Tami Grief     Test Name Result Flag Reference   CHOLESTEROL, TOTAL 143 mg/dL  125-200   HDL CHOLESTEROL 36 mg/dL L > OR = 40   TRIGLICERIDES 733 mg/dL  <150   LDL-CHOLESTEROL 86 mg/dL (calc)  <130   Desirable range <100 mg/dL for patients with CHD or  diabetes and <70 mg/dL for diabetic patients with  known heart disease  CHOL/HDLC RATIO 4 0 (calc)  < OR = 5 0   NON HDL CHOLESTEROL 107 mg/dL (calc)     Target for non-HDL cholesterol is 30 mg/dL higher than   LDL cholesterol target  (1) COMPREHENSIVE METABOLIC PANEL [id number] 48:24EA Tami Grief     Test Name Result Flag Reference   GLUCOSE 103 mg/dL H 65-99   Fasting reference interval     For someone without known diabetes, a glucose value  between 100 and 125 mg/dL is consistent with  prediabetes and should be confirmed with a  follow-up test    UREA NITROGEN (BUN) 15 mg/dL  7-25   CREATININE 1 10 mg/dL  0 70-1 33   For patients >52years of age, the reference limit  for Creatinine is approximately 13% higher for people  identified as -American  eGFR NON-AFR  AMERICAN 77 mL/min/1 73m2  > OR = 60   eGFR AFRICAN AMERICAN 90 mL/min/1 73m2  > OR = 60   BUN/CREATININE RATIO   5-88   NOT APPLICABLE (calc)   SODIUM 140 mmol/L  135-146   POTASSIUM 4 0 mmol/L  3 5-5 3   CHLORIDE 101 mmol/L     CARBON DIOXIDE 32 mmol/L H 20-31   CALCIUM 9 5 mg/dL  8 6-10 3   PROTEIN, TOTAL 6 6 g/dL  6 1-8 1   ALBUMIN 4 4 g/dL  3 6-5 1   GLOBULIN 2 2 g/dL (calc)  1 9-3 7   ALBUMIN/GLOBULIN RATIO 2 0 (calc)  1 0-2 5   BILIRUBIN, TOTAL 0 6 mg/dL  0 2-1 2   ALKALINE PHOSPHATASE 63 U/L     AST 22 U/L  10-35   ALT 31 U/L  9-46     (1) PSA (SCREEN) (Dx V76 44 Screen for Prostate Cancer) [id number] 06:38AM Tami Grief     Test Name Result Flag Reference   PSA, TOTAL 1 2 ng/mL  < OR = 4 0   The total PSA value from this assay system is   standardized against the WHO standard   The test   result will be approximately 20% lower when compared   to the equimolar-standardized total PSA (Werner   Saurabh)  Comparison of serial PSA results should be   interpreted with this fact in mind  This test was performed using the Siemens   chemiluminescent method  Values obtained from   different assay methods cannot be used  interchangeably  PSA levels, regardless of  value, should not be interpreted as absolute  evidence of the presence or absence of disease  (Q) HEMOGLOBIN A1c [id number] 06:38AM [facility]   REPORT COMMENT:  FASTING:YES     Test Name Result Flag Reference   HEMOGLOBIN A1c 5 6 % of total Hgb  <5 7   For the purpose of screening for the presence of  diabetes:     <5 7%       Consistent with the absence of diabetes  5 7-6 4%    Consistent with increased risk for diabetes              (prediabetes)  > or =6 5%  Consistent with diabetes     This assay result is consistent with a decreased risk  of diabetes  Currently, no consensus exists regarding use of  hemoglobin A1c for diagnosis of diabetes in children  According to American Diabetes Association (ADA)  guidelines, hemoglobin A1c <7 0% represents optimal  control in non-pregnant diabetic patients  Different  metrics may apply to specific patient populations  Standards of Medical Care in Diabetes(ADA)

## 2018-01-12 NOTE — RESULT NOTES
Message   notify pt normal tsh f/u as scheduled   notify the pt mild elevation of the blood sugar 110- decrease sweets and carbs and f/u to discuss   notify the pt normal f/u as scheduled   notify the pt normal HgA1c f/u as scheduled        Verified Results  (Q) TSH, 3RD GENERATION [id number] 07:05AM [facility] Number     Test Name Result Flag Reference   TSH 0 81 mIU/L  0 40-4 50     (1) COMPREHENSIVE METABOLIC PANEL [id number] 29:02MH [facility] Number     Test Name Result Flag Reference   GLUCOSE 110 mg/dL H 65-99   Fasting reference interval     For someone without known diabetes, a glucose value  between 100 and 125 mg/dL is consistent with  prediabetes and should be confirmed with a  follow-up test    UREA NITROGEN (BUN) 18 mg/dL  7-25   CREATININE 1 16 mg/dL  0 70-1 33   For patients >52years of age, the reference limit  for Creatinine is approximately 13% higher for people  identified as -American  eGFR NON-AFR   AMERICAN 73 mL/min/1 73m2  > OR = 60   eGFR AFRICAN AMERICAN 84 mL/min/1 73m2  > OR = 60   BUN/CREATININE RATIO   8-84   NOT APPLICABLE (calc)   SODIUM 142 mmol/L  135-146   POTASSIUM 4 1 mmol/L  3 5-5 3   CHLORIDE 104 mmol/L     CARBON DIOXIDE 32 mmol/L H 20-31   CALCIUM 9 8 mg/dL  8 6-10 3   PROTEIN, TOTAL 6 7 g/dL  6 1-8 1   ALBUMIN 4 4 g/dL  3 6-5 1   GLOBULIN 2 3 g/dL (calc)  1 9-3 7   ALBUMIN/GLOBULIN RATIO 1 9 (calc)  1 0-2 5   BILIRUBIN, TOTAL 0 5 mg/dL  0 2-1 2   ALKALINE PHOSPHATASE 59 U/L     AST 15 U/L  10-35   ALT 22 U/L  9-46     (1) T4, FREE [id number] 07:05AM [employer] Number     Test Name Result Flag Reference   T4, FREE 1 6 ng/dL  0 8-1 8     (Q) HEMOGLOBIN A1c 16Oct2017 07:05AM [facility] Number   REPORT COMMENT:  ALSO REQ #[id number]  FASTING:YES     Test Name Result Flag Reference   HEMOGLOBIN A1c 5 6 % of total Hgb  <5 7   For the purpose of screening for the presence of  diabetes:     <5 7%       Consistent with the absence of diabetes  5 7-6 4% Consistent with increased risk for diabetes              (prediabetes)  > or =6 5%  Consistent with diabetes     This assay result is consistent with a decreased risk  of diabetes  Currently, no consensus exists regarding use of  hemoglobin A1c for diagnosis of diabetes in children  According to American Diabetes Association (ADA)  guidelines, hemoglobin A1c <7 0% represents optimal  control in non-pregnant diabetic patients  Different  metrics may apply to specific patient populations  Standards of Medical Care in Diabetes(ADA)

## 2018-01-13 VITALS
RESPIRATION RATE: 18 BRPM | HEART RATE: 79 BPM | DIASTOLIC BLOOD PRESSURE: 88 MMHG | BODY MASS INDEX: 37.99 KG/M2 | SYSTOLIC BLOOD PRESSURE: 144 MMHG | WEIGHT: 296.02 LBS | HEIGHT: 74 IN

## 2018-01-13 VITALS
HEART RATE: 64 BPM | HEIGHT: 74 IN | WEIGHT: 266.44 LBS | DIASTOLIC BLOOD PRESSURE: 98 MMHG | SYSTOLIC BLOOD PRESSURE: 142 MMHG | BODY MASS INDEX: 34.2 KG/M2

## 2018-01-13 VITALS
DIASTOLIC BLOOD PRESSURE: 98 MMHG | RESPIRATION RATE: 16 BRPM | HEART RATE: 72 BPM | SYSTOLIC BLOOD PRESSURE: 142 MMHG | WEIGHT: 270.02 LBS | TEMPERATURE: 97.9 F | BODY MASS INDEX: 34.65 KG/M2 | HEIGHT: 74 IN

## 2018-01-13 VITALS
TEMPERATURE: 96.6 F | DIASTOLIC BLOOD PRESSURE: 76 MMHG | SYSTOLIC BLOOD PRESSURE: 124 MMHG | BODY MASS INDEX: 35.31 KG/M2 | OXYGEN SATURATION: 97 % | WEIGHT: 275 LBS | HEART RATE: 76 BPM

## 2018-01-13 NOTE — RESULT NOTES
Message   Notify the patient the venous Doppler was negative follow up as scheduled to discuss        Verified Results  VAS LOWER LIMB VENOUS DUPLEX STUDY, COMPLETE BILATERAL [id number] 07:42AM Darius Keenan Order Number: [id number]    - Patient Instructions: To schedule this appointment, please contact Central Scheduling at [phone]   Order Number: [id number]    - Patient Instructions: To schedule this appointment, please contact Central Scheduling at [phone]  Test Name Result Flag Reference   VAS LOWER LIMB VENOUS DUPLEX STUDY, COMPLETE BILATERAL (Report)     [facility] REPORT   CLINICAL:   Indications:   Swelling of Limb [R22 4]  Patient presents with bilateral swelling fro several   weeks/months; however, he reports this could be longer but has not noticed it  Denies any pain  Operative History:   Patient denies any cardiovascular surgeries   Risk Factors:   Patient has a current history of Obesity  CONCLUSION:   Impression:   RIGHT LOWER LIMB:   No evidence of acute or chronic deep vein thrombosis  No evidence of superficial thrombophlebitis noted  Doppler evaluation shows a normal response to augmentation maneuvers  Popliteal, posterior tibial and anterior tibial arterial Doppler waveforms are   triphasic  LEFT LOWER LIMB:   No evidence of acute or chronic deep vein thrombosis  No evidence of superficial thrombophlebitis noted  Doppler evaluation shows a normal response to augmentation maneuvers     Popliteal, posterior tibial and anterior tibial arterial Doppler waveforms are   triphasic      SIGNATURE:   Electronically Signed by: Jaylene Olea MD, RPVI on 2016-11-10 05:22:13 PM       Signatures   Electronically signed by : Christy Chaudhary DO; Nov 10 2016  7:35PM EST                       (Author)

## 2018-01-13 NOTE — MISCELLANEOUS
Signatures   Electronically signed by : Brad Diop DO; Jan 13 2017  9:17PM EST                       (Author)

## 2018-01-13 NOTE — RESULT NOTES
Message   notify the patient normal lipid level except a mildly low HDL cholesterol follow up as scheduled to discuss        Verified Results  (1) LIPID PANEL, FASTING [id number] 07:39AM Edil Oven     Test Name Result Flag Reference   CHOLESTEROL, TOTAL 132 mg/dL  125-200   HDL CHOLESTEROL 36 mg/dL L > OR = 40   TRIGLICERIDES 54 mg/dL  <323   LDL-CHOLESTEROL 85 mg/dL (calc)  <130   Desirable range <100 mg/dL for patients with CHD or  diabetes and <70 mg/dL for diabetic patients with  known heart disease  CHOL/HDLC RATIO 3 7 (calc)  < OR = 5 0   NON HDL CHOLESTEROL 96 mg/dL (calc)     Target for non-HDL cholesterol is 30 mg/dL higher than   LDL cholesterol target         Signatures   Electronically signed by : Magnus Seip, DO; May 30 2017  7:32PM EST                       (Author)

## 2018-01-13 NOTE — PROGRESS NOTES
Assessment   1  Diabetes mellitus type 2, uncontrolled (250 02) (E11 65)   2  Hypothyroidism, postradioiodine therapy (244 1) (E89 0)   3  Hypertension (401 9) (I10)   4  Hyperlipidemia (272 4) (E78 5)   5  Thyroid nodule (241 0) (E04 1)    Plan   Diabetes mellitus type 2, uncontrolled    · FreeStyle Lite Test In Vitro Strip; TEST ONCE DAILY   Rx By: Wesly Norman; Dispense: 90 Days ; #:1 X 100 Strip Box; Refill: 3;For: Diabetes mellitus type 2, uncontrolled; BRISA = N; Faxed To: [id number]  · (1) HEMOGLOBIN A1C; Status:Active; Requested MZF:[id number]; Perform:Located within [facility]; LXM:10BSN4490;YBXJCNM; For:Diabetes mellitus type 2, uncontrolled; Ordered By:Natanael Andres;  Hypertension    · (1) COMPREHENSIVE METABOLIC PANEL; Status:Active; Requested LGZ:80WWM2155; Perform:Located within [facility]; UHN:87CET9420;OWFOMFL;NDA:UTWSZGXIFRDX; Ordered By:Xavier Andres;  Hypothyroidism, postradioiodine therapy    · (1) T4, FREE; Status:Active; Requested QQO:99NQG7648; Perform:Located within [facility]; CER:26YXR4872;QCVTFWD;BWD:CQXCRVODOBQQMN, postradioiodine therapy; Ordered By:Xavier Andres;   · (1) TSH; Status:Active; Requested ROMAN:[id number]; Perform:Located within [facility]; JBV:37TAF7208;NTATZRV;QRM:QSCWDATRTMSJKN, postradioiodine therapy; Ordered By:Xavier Andres;  Thyroid nodule    · US THYROID; Status:Hold For - Scheduling; Requested for:09Mar2018; Perform:[facility]; CSA:01TTQ3790;ZRQDRXZ; For:Thyroid nodule; Ordered By:Xavier Andres;    Discussion/Summary   Discussion Summary:    1  Type 2 diabetes: He presents today with no blood sugar records or meter for download  His most recent hemoglobin A1c was 5 6  He will continue his therapy with metformin and continue to check his blood sugars twice daily at alternating times and forwarding a record to the office in 2 weeks for review and adjustment if necessary      Hypothyroidism: Generally, he feels well and is without symptom/complaints of hypo-or hyperthyroidism  Continue levothyroxine at current dose  Check TSH and free T4  Thyroid nodule: He has a subcentimeter nodule on his latest ultrasound  Will reevaluate an ultrasound in March 2018  Hypertension: He is normotensive in the office today  Continue lisinopril  Hyperlipidemia: Continue Crestor  Counseling Documentation With Imm: The patient was counseled regarding diagnostic results,-- instructions for management,-- risk factor reductions,-- patient and family education,-- impressions,-- risks and benefits of treatment options,-- importance of compliance with treatment  Medication SE Review and Pt Understands Tx: Possible side effects of new medications were reviewed with the patient/guardian today  The treatment plan was reviewed with the patient/guardian  The patient/guardian understands and agrees with the treatment plan      Chief Complaint   Chief Complaint Free Text Note Form: Follow Up      History of Present Illness   HPI: 29-year-old male presents in the office today for follow-up of type 2 diabetes, hypothyroidism and thyroid nodule  He states he is been diagnosed with type II diabetes for past several years  He is checking his blood sugars twice daily at alternating times  He states he has had troubles with depression over the past few months and has only been checking his blood sugars sporadically  His most recent hemoglobin A1c from October 16, 2017 is 5 6  He denies polyuria, polydipsia and polyphagia  He is currently taking metformin 1000 mg twice daily  He obtained a diabetic eye exam in May 2017 which was negative retinopathy  He has no complaints about his feet and does not follow podiatry for regular diabetic foot care  his hypothyroidism, he is taking levothyroxine 125 mcg daily consistently and in the proper manner  He has no complaints of hypo-or hyperthyroidism   His most recent TSH from October 16, 2017 is 0 81 with a free T4 of 1 6    his hypertension, he is taking lisinopril 20 mg daily  He denies cough  hyperlipidemia is treated with Crestor 5 mg daily  He denies muscle aches  his thyroid nodule he recently obtained an ultrasound of the thyroid on March 7, 2017 which revealed a subcentimeter hypoechoic nodule or cyst  He denies neck pain/discomfort, dysphagia or dysphonia  Review of Systems   Endo Adult ROS Male Established v2 - [facility]:      Constitutional/General: no recent weight gain,-- no recent weight loss,-- no poor energy/fatigue,-- no increased energy level,-- no insomnia/sleep problems,-- no fever-- and-- no feeling weak  Heart: no high blood pressure,-- no chest pain/tightness,-- no rapid/racing heart rate-- and-- no palpitations  Genitourinary - Urinary no frequent urination,-- no excess urination-- and-- no urinating during the night  Eyes: no blurred vision,-- no double vision,-- no bulging eyes,-- no gritty/scratchy eyes-- and-- no excessive tearing  Mouth / Throat: no hoarseness-- and-- no difficulty swallowing  Neck: no lumps,-- no swollen glands,-- no neck pain,-- no neck stiffness-- and-- no enlarged thyroid  Respiratory: no wheezing,-- no asthma-- and-- no persistent cough  Musculoskeletal: no muscle aches/pain,-- no joint aches/pain-- and-- no muscle weakness  Skin & Hair: no dry skin,-- no acne,-- the hair texture was not oily,-- no hair loss-- and-- no excessive hair growth  Gastrointestinal: no constipation,-- no diarrhea,-- no waking at night to drink-- and-- no stomach ache  Neurological: no blackouts,-- no weakness-- and-- no tremors  Genital: no testicular pain,-- no testicular lumps/bumps/mass-- and-- performs monthly testicular exam is not applicable        Endocrine: no feeling hot frequently,-- no feeling cold frequently,-- no shifts between feeling hot and cold,-- no cold hands or feet,-- no excessive sweating,-- no thyroid problems,-- no blood sugar problems,-- no excessive thirst,-- no excessive hunger,-- no change in shoe size,-- no nausea or vomiting-- and-- no shaky hands  ROS Reviewed:    ROS reviewed  Active Problems   1  Abdominal discomfort (789 00) (R10 9)   2  Abdominal pain (789 00) (R10 9)   3  Abdominal wall hernia (553 20) (K43 9)   4  Abnormal ECG (794 31) (R94 31)   5  Abnormal weight loss (783 21) (R63 4)   6  Allergic rhinitis (477 9) (J30 9)   7  Anxiety (300 00) (F41 9)   8  Atypical chest pain (786 59) (R07 89)   9  Bilateral leg edema (782 3) (R60 0)   10  Bloating (787 3) (R14 0)   11  Change in bowel habits (787 99) (R19 4)   12  Cholelithiasis (574 20) (K80 20)   13  Colon cancer screening (V76 51) (Z12 11)   14  Colon polyps (211 3) (K63 5)   15  Depression (311) (F32 9)   16  Diabetes mellitus type 2, uncontrolled (250 02) (E11 65)   17  Elevated blood sugar (790 29) (R73 9)   18  Encounter for prostate cancer screening (V76 44) (Z12 5)   19  Fatigue (780 79) (R53 83)   20  First degree AV block (426 11) (I44 0)   21  Flatus (787 3) (R14 3)   22  Flu vaccine need (V04 81) (Z23)   23  Gall bladder polyp (575 6) (K82 4)   24  Gallbladder disease (575 9) (K82 9)   25  Gallstone Ileus (560 31)   26  Headache (784 0) (R51)   27  Hernia (553 9) (K46 9)   28  Hyperlipidemia (272 4) (E78 5)   29  Hypertension (401 9) (I10)   30  Hypothyroidism, postradioiodine therapy (244 1) (E89 0)   31  Incomplete emptying of bladder (788 21) (R33 9)   32  Insomnia (780 52) (G47 00)   33  Irregular bowel habits (569 89) (R19 8)   34  Lump of skin (782 2) (R22 9)   35  Migraines (346 90) (G43 909)   36  Myalgia (729 1) (M79 1)   37  Need for 23-polyvalent pneumococcal polysaccharide vaccine (V03 82) (Z23)   38  Need for prophylactic vaccination and inoculation against influenza (V04 81) (Z23)   39  Obesity (278 00) (E66 9)   40  Obstructive sleep apnea (327 23) (G47 33)   41  Pharyngitis (462) (J02 9)   42   Post-void dribbling (788 35) (N39 43) 43  Preop examination (V72 84) (Z01 818)   44  Prolonged Q-T interval on ECG (794 31) (R94 31)   45  Restless leg syndrome (333 94) (G25 81)   46  Shortness of breath (786 05) (R06 02)   47  Sinus bradycardia (427 89) (R00 1)   48  Tachycardia (785 0) (R00 0)   49  Thyroid nodule (241 0) (E04 1)   50  Thyromegaly (240 9) (E04 9)   51  Trapezius muscle spasm (728 85) (M62 838)   52  Umbilical hernia (620 6) (K42 9)   53  Urine abnormality (791 9) (R82 90)   54  Venous insufficiency (459 81) (I87 2)   55  Viral bronchitis (466 0) (J20 8)   56  Wheezing (786 07) (R06 2)    Past Medical History   1  History of hyperglycemia (V12 29) (Z86 39)   2  History of hyperthyroidism (V12 29) (Z86 39)   3  History of thyroid disease (V12 29) (Z86 39)   4  History of Hyperthyroidism (242 90) (E05 90)   5  History of Irregular heart beat (427 9) (I49 9)   6  History of Venous insufficiency (459 81) (I87 2)   7  History of Wheezing (786 07) (R06 2)  Active Problems And Past Medical History Reviewed: The active problems and past medical history were reviewed and updated today  Surgical History   1  History of Anastomosis Of Gallbladder   2  History of Hernia Repair   3  History of Surgery Vas Deferens Vasectomy  Surgical History Reviewed: The surgical history was reviewed and updated today  Family History   Mother    1  Denied: Family history of Colon cancer   2  Denied: Family history of colonic polyps   3  Denied: Family history of liver disease   4  Family history of lung cancer (V16 1) (Z80 1)   5  Family history of Lung Cancer (V16 1)  Father    6  Denied: Family history of Colon cancer   7  Denied: Family history of colonic polyps   8  Denied: Family history of liver disease  Family History    9  Family history of Family Health Status Of Father - Alive   8  Family history of Family Health Status Of Mother -    6  Family history of Prostate Cancer (A07 46)  Family History Reviewed:     The family history was reviewed and updated today  Social History    · Denied: History of Being A Social Drinker   · Caffeine Use   · Denied: History of Drug Use   · Job Physical Requirements Heavy Lifting   · Non-smoker (V49 89) (Z78 9)   · Occasional alcohol use   · Work-related Stress (V62 1)  Social History Reviewed: The social history was reviewed and updated today  Current Meds    1  FreeStyle Lancets Miscellaneous; Use once a day; Therapy: 87ZAC7432 to (Last Rx:06Apr2017)  Requested for: 06Apr2017 Ordered   2  FreeStyle Lite Test In Vitro Strip; TEST ONCE DAILY; Therapy: 54RCW5340 to (Evaluate:01Apr2018)  Requested for: 06Apr2017; Last     Rx:06Apr2017 Ordered   3  Gabapentin 100 MG Oral Capsule; 1-2 capsules by mouth at bedtime; Therapy: 94HHQ7464 to (Evaluate:17Mar2018)  Requested for: 22Mar2017; Last     Rx:22Mar2017 Ordered   4  Levothyroxine Sodium 125 MCG Oral Tablet; TAKE 1 TABLET DAILY; Therapy: 41SUD7738 to (Evaluate:17Mar2018)  Requested for: 70SXK0767; Last     Rx:22Mar2017 Ordered   5  Lisinopril 20 MG Oral Tablet; Take 1 tablet daily; Therapy: 02ZGY2600 to (Evaluate:16Aug2018)  Requested for: 21Aug2017; Last     Rx:21Aug2017 Ordered   6  MetFORMIN HCl - 1000 MG Oral Tablet; take 1 tablet by mouth twice a day; Therapy: 98KDC7172 to (Last Rx:23Nov2017)  Requested for: 23Nov2017 Ordered   7  Rizatriptan Benzoate 10 MG Oral Tablet Disintegrating; TAKE 1 TABLET AT ONSET OF     HEADACHE  MAY REPEAT EVERY 2 HOURS AS NEEDED  MAXIMUM 3 TABLETS IN 24     HOURS; Therapy: 22BCV8305 to (Evaluate:05Jan2017)  Requested for: 61NHB3795; Last     Rx:11Jan2016 Ordered   8  Rosuvastatin Calcium 5 MG Oral Tablet; TAKE 1 TABLET BY MOUTH     DAILY; Therapy: 84EGJ1118 to (Evaluate:21Aug2018)  Requested for: 82JYN4478; Last     Rx:26Nov2017 Ordered   9  Sertraline HCl - 25 MG Oral Tablet; TAKE 1 TABLET DAILY;      Therapy: 46YCU6254 to (Ruben Crouch)  Requested for: 58CMS7936; Last Rx:60Fgp3440 Ordered  Medication List Reviewed: The medication list was reviewed and updated today  Allergies   1  No Known Drug Allergies  2  Seasonal    Vitals   Vital Signs    Recorded: 76IVZ5414 05:26PM   Heart Rate 82   Systolic 521   Diastolic 88   Height 6 ft 2 in   Weight 275 lb    BMI Calculated 35 31   BSA Calculated 2 49     Physical Exam        Constitutional      General appearance: No acute distress, well appearing and well nourished  -- obese  Eyes      Conjunctiva and lids: No swelling, erythema, or discharge  Pupils: Equal, round and reactive to light  The sclera are anicteric  Extraocular movements are intact  Ears, Nose, Mouth, and Throat      External inspection of ears, nose and lips: Normal        Oropharynx: Normal with no erythema, edema, exudate or lesions  Exam of Head: The head is atraumatic and normocephalic  Neck: The neck is supple  The thyroid is normal in size with no palpable nodules  Pulmonary      Respiratory effort: No increased work of breathing or signs of respiratory distress  Auscultation of lungs: Clear to auscultation bilaterally with normal chest expansion  Cardiovascular      Auscultation of heart: Normal rate and rhythm with no murmurs, gallops or rubs  Examination of extremities for edema and/or varicosities: Normal        Examination of pulses: Dorsalis pedal pulses are +2 and equal bilaterally  Examination of Carotids: No bruits  Abdomen      Abdomen: Abdomen is soft, non-tender with normal bowel sounds  Liver and spleen: No hepatomegaly or splenomegaly  Lymphatic      Palpation of lymph nodes: No supraclavicular or suboccipital lymphadenopathy  Musculoskeletal      Gait and station: Normal        Digits and nails: Normal without clubbing or cyanosis         Inspection/palpation of joints, bones, and muscles: Muscle bulk and tone is normal        Skin      Skin and subcutaneous tissue: Abnormal  -- Dry skin  Neurologic      Cranial nerves: Cranial nerves 2-12 intact  Reflexes: 2+ and symmetric  Sensation: No sensory loss  Motor Strength: Strength is 5/5 bilaterally  Psychiatric      Orientation to person, place and time: Normal        Mood and affect: Affect and attention span are normal           Socks and shoes removed, Right Foot Findings: normal foot, no swelling, no erythema  The right toes were normal-- and-- had full range of motion  The sensory exam showed normal vibratory sensation at the level of the toes on the right  Normal tactile sensation with monofilament testing throughout the right foot  Socks and shoes removed, Left Foot Findings: normal foot, no swelling, no erythema  The left toes were normal-- and-- had full range of motion  The sensory exam showed normal vibratory sensation at the level of the toes on the left  Normal tactile sensation with monofilament testing throughout the left foot  Pulses:      2+ in the dorsalis pedis on the right  Pulses:      2+ in the dorsalis pedis on the left  Results/Data   (Q) TSH, 3RD GENERATION 94Qet8962 07:05AM [facility] Call      Test Name Result Flag Reference   TSH 0 81 mIU/L  0 40-4 50      (1) COMPREHENSIVE METABOLIC PANEL 17GGS7561 28:85MO [facility] Call      Test Name Result Flag Reference   GLUCOSE 110 mg/dL H 65-99   Fasting reference interval           For someone without known diabetes, a glucose value     between 100 and 125 mg/dL is consistent with     prediabetes and should be confirmed with a     follow-up test    UREA NITROGEN (BUN) 18 mg/dL  7-25   CREATININE 1 16 mg/dL  0 70-1 33   For patients >52years of age, the reference limit     for Creatinine is approximately 13% higher for people     identified as -American  eGFR NON-AFR   AMERICAN 73 mL/min/1 73m2  > OR = 60   eGFR AFRICAN AMERICAN 84 mL/min/1 73m2  > OR = 60   BUN/CREATININE RATIO   0-74   NOT APPLICABLE (calc)   SODIUM 142 mmol/L  135-146   POTASSIUM 4 1 mmol/L  3 5-5 3   CHLORIDE 104 mmol/L     CARBON DIOXIDE 32 mmol/L H 20-31   CALCIUM 9 8 mg/dL  8 6-10 3   PROTEIN, TOTAL 6 7 g/dL  6 1-8 1   ALBUMIN 4 4 g/dL  3 6-5 1   GLOBULIN 2 3 g/dL (calc)  1 9-3 7   ALBUMIN/GLOBULIN RATIO 1 9 (calc)  1 0-2 5   BILIRUBIN, TOTAL 0 5 mg/dL  0 2-1 2   ALKALINE PHOSPHATASE 59 U/L     AST 15 U/L  10-35   ALT 22 U/L  9-46      (1) T4, FREE 16Oct2017 07:05AM Kwame Leach      Test Name Result Flag Reference   T4, FREE 1 6 ng/dL  0 8-1 8      (Q) HEMOGLOBIN A1c 16Oct2017 07:05AM Kwame Leach   REPORT COMMENT:     ALSO [facility] #[id number]     FASTING:YES      Test Name Result Flag Reference   HEMOGLOBIN A1c 5 6 % of total Hgb  <5 7   For the purpose of screening for the presence of     diabetes:           <5 7%       Consistent with the absence of diabetes     5 7-6 4%    Consistent with increased risk for diabetes                 (prediabetes)     > or =6 5%  Consistent with diabetes           This assay result is consistent with a decreased risk     of diabetes  Currently, no consensus exists regarding use of     hemoglobin A1c for diagnosis of diabetes in children  According to American Diabetes Association (ADA)     guidelines, hemoglobin A1c <7 0% represents optimal     control in non-pregnant diabetic patients  Different     metrics may apply to specific patient populations  Standards of Medical Care in Diabetes(ADA)  Health Management   Colon polyps   COLONOSCOPY (GI, SURG); every 3 years; Last 38BRO8605; Next Due: 83Tex4756;  Active    Future Appointments      Date/Time Provider Specialty Site   02/21/2018 04:30 PM Kwame Leach DO Internal [facility]     Signatures    Electronically signed by : Christen Whaley; Leland 10 2018  8:46PM EST                       (Author)     Electronically signed by : Devora Alfonso KEN Buchanan ; Jan 12 2018  8:09AM EST

## 2018-01-13 NOTE — MISCELLANEOUS
Message  Message Free Text Note Form: I spoke to [facility] Dru Romeron today regarding the referral he received from Dr Nicky Borja for the [facility]  I will email him some information and he said he would call me after he speaks to his insurance company regarding coverage of this program       Active Problems    1  Abdominal discomfort (789 00) (R10 9)   2  Abdominal pain (789 00) (R10 9)   3  Abdominal wall hernia (553 20) (K43 9)   4  Abnormal ECG (794 31) (R94 31)   5  Abnormal weight loss (783 21) (R63 4)   6  Allergic rhinitis (477 9) (J30 9)   7  Anxiety (300 00) (F41 9)   8  Atypical chest pain (786 59) (R07 89)   9  Bilateral leg edema (782 3) (R60 0)   10  Bloating (787 3) (R14 0)   11  Change in bowel habits (787 99) (R19 4)   12  Cholelithiasis (574 20) (K80 20)   13  Colon cancer screening (V76 51) (Z12 11)   14  Diabetes mellitus type 2, uncontrolled (250 02) (E11 65)   15  Elevated blood sugar (790 29) (R73 9)   16  Fatigue (780 79) (R53 83)   17  First degree AV block (426 11) (I44 0)   18  Flatus (787 3) (R14 3)   19  Flu vaccine need (V04 81) (Z23)   20  Gall bladder polyp (575 6) (K82 4)   21  Gallbladder disease (575 9) (K82 9)   22  Gallstone Ileus (560 31)   23  Headache (784 0) (R51)   24  Hernia (553 9) (K46 9)   25  Hyperlipidemia (272 4) (E78 5)   26  Hypertension (401 9) (I10)   27  Hypothyroidism, postradioiodine therapy (244 1) (E89 0)   28  Incomplete emptying of bladder (788 21) (R33 9)   29  Insomnia (780 52) (G47 00)   30  Irregular bowel habits (569 89) (R19 8)   31  Lump of skin (782 2) (R22 9)   32  Migraines (346 90) (G43 909)   33  Myalgia (729 1) (M79 1)   34  Need for 23-polyvalent pneumococcal polysaccharide vaccine (V03 82) (Z23)   35  Obesity (278 00) (E66 9)   36  Obstructive sleep apnea (327 23) (G47 33)   37  Pharyngitis (462) (J02 9)   38  Post-void dribbling (788 35) (N39 43)   39  Preop examination (V72 84) (Z01 818)   40   Prolonged Q-T interval on ECG (794 31) (R94 31)   41  Restless leg syndrome (333 94) (G25 81)   42  Shortness of breath (786 05) (R06 02)   43  Sinus bradycardia (427 89) (R00 1)   44  Tachycardia (785 0) (R00 0)   45  Thyroid nodule (241 0) (E04 1)   46  Thyromegaly (240 9) (E04 9)   47  Trapezius muscle spasm (728 85) (M62 838)   48  Umbilical hernia (757 3) (K42 9)   49  Urine abnormality (791 9) (R82 90)   50  Venous insufficiency (459 81) (I87 2)   51  Wheezing (786 07) (R06 2)    Current Meds   1  FreeStyle Lancets Miscellaneous; Use once a day; Therapy: 22OLJ8395 to (Last Rx:06Apr2017)  Requested for: 06Apr2017 Ordered   2  FreeStyle Lite Test In Vitro Strip; TEST ONCE DAILY; Therapy: 90ZEW9997 to (Evaluate:01Apr2018)  Requested for: 06Apr2017; Last   Rx:06Apr2017 Ordered   3  Gabapentin 100 MG Oral Capsule; 1-2 capsules by mouth at bedtime; Therapy: 27EDY1799 to (Evaluate:17Mar2018)  Requested for: 22Mar2017; Last   Rx:22Mar2017 Ordered   4  Levothyroxine Sodium 125 MCG Oral Tablet; TAKE 1 TABLET DAILY; Therapy: 27XYN4010 to (Evaluate:17Mar2018)  Requested for: 38XOY2241; Last   Rx:22Mar2017 Ordered   5  Lisinopril 20 MG Oral Tablet; TAKE 1 TABLET DAILY; Therapy: 26PRT4426 to (Evaluate:50Xfk8425)  Requested for: 47Vzd2598; Last   Rx:09Aug2016 Ordered   6  MetFORMIN HCl ER (OSM) 500 MG Oral Tablet Extended Release 24 Hour; TAKE 2   TABLETS BY MOUTH TWO TIMES DAILY; Therapy: 14EJX0875 to (Evaluate:05Jun2017)  Requested for: 15GQJ8154; Last   Rx:06Apr2017 Ordered   7  Rizatriptan Benzoate 10 MG Oral Tablet Dispersible (Maxalt-MLT); TAKE 1 TABLET AT   ONSET OF HEADACHE  MAY REPEAT EVERY 2 HOURS AS NEEDED  MAXIMUM 3   TABLETS IN 24 HOURS; Therapy: 28LOZ6463 to (Evaluate:05Jan2017)  Requested for: 99XOA2823; Last   Rx:11Jan2016 Ordered   8  Rosuvastatin Calcium 5 MG Oral Tablet (Crestor); TAKE 1 TABLET DAILY; Therapy: 42NXO7722 to (Evaluate:51Uwm1053)  Requested for: 01RIV9844; Last   Rx:27Kjj9864 Ordered    Allergies    1   No Known Drug Allergies    2   Seasonal    Signatures   Electronically signed by : Yamilet Wells, ; Apr 10 2017  2:05PM EST                       (Author)

## 2018-01-14 VITALS
HEART RATE: 64 BPM | BODY MASS INDEX: 34.58 KG/M2 | DIASTOLIC BLOOD PRESSURE: 80 MMHG | SYSTOLIC BLOOD PRESSURE: 118 MMHG | WEIGHT: 269.44 LBS | HEIGHT: 74 IN

## 2018-01-14 VITALS
WEIGHT: 282.5 LBS | HEART RATE: 62 BPM | BODY MASS INDEX: 36.27 KG/M2 | SYSTOLIC BLOOD PRESSURE: 140 MMHG | DIASTOLIC BLOOD PRESSURE: 90 MMHG

## 2018-01-14 VITALS
BODY MASS INDEX: 37.61 KG/M2 | HEART RATE: 72 BPM | RESPIRATION RATE: 18 BRPM | DIASTOLIC BLOOD PRESSURE: 86 MMHG | HEIGHT: 74 IN | SYSTOLIC BLOOD PRESSURE: 136 MMHG | WEIGHT: 293.04 LBS

## 2018-01-14 NOTE — RESULT NOTES
Message   Ultrasound of the thyroid does show interval atrophy (thyroid got smaller) of the thyroid with a diffuse heterogenicity consistent with chronic thyroiditis and also subcentimeter hypoechoic nodule or cyst please have the patient see endocrinologist Dr Katerine Bae and follow up as scheduled        Verified Results  [address] [place] [address] [id number] 04:33PM [facility] Order Number: [id number]    - Patient Instructions: To schedule this appointment, please contact Central Scheduling at [phone]  Test Name Result Flag Reference   US THYROID (Report)     THYROID ULTRASOUND     INDICATION: Enlarged left thyroid on palpation     COMPARISON: 5/16/2014     TECHNIQUE:  Ultrasound of the thyroid was performed with a high frequency linear transducer in transverse and sagittal planes including volumetric imaging sweeps as well as traditional still imaging technique  FINDINGS:   The thyroid is atrophic and severely heterogeneous  This is decreased compared to the prior study  Right gland: 1 1 x 1 1 x 2 8 cm  No dominant nodules  Left gland: 1 0 x 1 0 x 3 2 cm  There is a small left lower pole nodule measuring 4 x 6 x 7 mm either representing a small hypoechoic nodule or cyst  This was not discretely seen previously  Isthmus: The isthmus is 0 2 cm in AP dimension  IMPRESSION:      1  Interval atrophy of the thyroid with diffuse heterogeneity consistent with chronic thyroiditis  2  Subcentimeter hypoechoic nodule or cyst, not discretely seen previously               Workstation performed: [id number]     Signed by:   Jose Herrera MD   3/7/17       Signatures   Electronically signed by : Yaa Shaikh DO; Mar  7 2017  7:07PM EST                       (Author)

## 2018-01-15 NOTE — RESULT NOTES
Message   Minus Malik please schedule this patient in the next 1 week to discuss new onset diabetes; also elevation of the TSH increase the levothyroxine to 125 mcg once a day and recheck a TSH in 4 weeks        Verified Results  (1) COMPREHENSIVE METABOLIC PANEL [id number] 30:94JC [facility]     Test Name Result Flag Reference   GLUCOSE 134 mg/dL H 65-99   Fasting reference interval   UREA NITROGEN (BUN) 20 mg/dL  7-25   CREATININE 1 28 mg/dL  0 70-1 33   For patients >52years of age, the reference limit  for Creatinine is approximately 13% higher for people  identified as -American  eGFR NON-AFR  AMERICAN 65 mL/min/1 73m2  > OR = 60   eGFR AFRICAN AMERICAN 75 mL/min/1 73m2  > OR = 60   BUN/CREATININE RATIO   0-72   NOT APPLICABLE (calc)   SODIUM 139 mmol/L  135-146   POTASSIUM 4 0 mmol/L  3 5-5 3   CHLORIDE 102 mmol/L     CARBON DIOXIDE 27 mmol/L  20-31   CALCIUM 9 2 mg/dL  8 6-10 3   PROTEIN, TOTAL 7 0 g/dL  6 1-8 1   ALBUMIN 4 4 g/dL  3 6-5 1   GLOBULIN 2 6 g/dL (calc)  1 9-3 7   ALBUMIN/GLOBULIN RATIO 1 7 (calc)  1 0-2 5   BILIRUBIN, TOTAL 0 7 mg/dL  0 2-1 2   ALKALINE PHOSPHATASE 60 U/L     AST 24 U/L  10-35   ALT 37 U/L  9-46     (1) LIPID PANEL, FASTING 23Jan2017 07:01AM [facility]     Test Name Result Flag Reference   CHOLESTEROL, TOTAL 144 mg/dL  125-200   HDL CHOLESTEROL 30 mg/dL L > OR = 40   TRIGLICERIDES 92 mg/dL  <499   LDL-CHOLESTEROL 96 mg/dL (calc)  <130   Desirable range <100 mg/dL for patients with CHD or  diabetes and <70 mg/dL for diabetic patients with  known heart disease  CHOL/HDLC RATIO 4 8 (calc)  < OR = 5 0   NON HDL CHOLESTEROL 114 mg/dL (calc)     Target for non-HDL cholesterol is 30 mg/dL higher than   LDL cholesterol target       (1) CK (CPK) 50QQU1931 07:01AM [facility]     Test Name Result Flag Reference   CREATINE KINASE, TOTAL 125 U/L       (1) ALDOLASE [id number] 07:01AM [facility]     Test Name Result Flag Reference ALDOLASE 5 3 U/L  < OR = 8 1     (Q) TSH, 3RD GENERATION 23Jan2017 07:01AM [facility]     Test Name Result Flag Reference   TSH 5 84 mIU/L H 0 40-4 50     (Q) HEMOGLOBIN A1c 23Jan2017 07:01AM [facility]   REPORT COMMENT:  ALSO Cinda Mckinnon #[id number]  FASTING:YES     Test Name Result Flag Reference   HEMOGLOBIN A1c 7 8 % of total Hgb H <5 7   According to ADA guidelines, hemoglobin A1c <7 0%  represents optimal control in non-pregnant diabetic  patients  Different metrics may apply to specific  patient populations  Standards of Medical Care in    Diabetes Care  2013;36:s11-s66     For the purpose of screening for the presence of  diabetes  <5 7%       Consistent with the absence of diabetes  5 7-6 4%    Consistent with increased risk for diabetes              (prediabetes)  >or=6 5%    Consistent with diabetes     This assay result is consistent with diabetes  mellitus  Currently, no consensus exists for use of hemoglobin  A1c for diagnosis of diabetes for children         Signatures   Electronically signed by : Jean Maddox DO; Jan 24 2017  5:36PM EST                       (Author)

## 2018-01-15 NOTE — RESULT NOTES
Message   Notify the patient the blood sugar is elevated 145, also there is sugar in the urine I am concerned to rule out type 2 diabetes I would like the patient go for a hemoglobin A1c, I would like the patient to reduce carbohydrates sweets and I would like the patient to make a follow-up to discuss further        Verified Results  (Q) TSH, 3RD GENERATION 15Oct2016 10:19AM [facility]   REPORT COMMENT:  FASTING:NO     Test Name Result Flag Reference   TSH 3 33 mIU/L  0 40-4 50     (1) URINALYSIS (will reflex a microscopy if leukocytes, occult blood, protein or nitrites are not within normal limits) 14Oct2016 04:42PM [facility]   TW Order Number: [id number]     Test Name Result Flag Reference   COLOR Yellow     CLARITY Clear     SPECIFIC GRAVITY UA 1 016  1 003-1 030   PH UA 6 0  4 5-8 0   LEUKOCYTE ESTERASE UA Negative  Negative   NITRITE UA Negative  Negative   PROTEIN UA Negative mg/dl  Negative   GLUCOSE  (1/4%) mg/dl A Negative   KETONES UA Negative mg/dl  Negative   UROBILINOGEN UA 0 2 E U /dl  0 2, 1 0 E U /dl   BILIRUBIN UA Negative  Negative   BLOOD UA Negative  Negative     (1) COMPREHENSIVE METABOLIC PANEL [id number] 08:40FO [facility]   REPORT COMMENT:  FASTING:YES  AN UPDATE OR CORRECTION HAS BEEN MADE TO NAME     Test Name Result Flag Reference   GLUCOSE 145 mg/dL H 65-99   Fasting reference interval   UREA NITROGEN (BUN) 16 mg/dL  7-25   CREATININE 1 21 mg/dL  0 70-1 33   For patients >52years of age, the reference limit  for Creatinine is approximately 13% higher for people  identified as -American  eGFR NON-AFR   AMERICAN 69 mL/min/1 73m2  > OR = 60   eGFR AFRICAN AMERICAN 80 mL/min/1 73m2  > OR = 60   BUN/CREATININE RATIO   9-39   NOT APPLICABLE (calc)   SODIUM 140 mmol/L  135-146   POTASSIUM 4 1 mmol/L  3 5-5 3   CHLORIDE 102 mmol/L     CARBON DIOXIDE 30 mmol/L  20-31   CALCIUM 9 6 mg/dL  8 6-10 3   PROTEIN, TOTAL 6 8 g/dL  6 1-8 1 ALBUMIN 4 3 g/dL  3 6-5 1   GLOBULIN 2 5 g/dL (calc)  1 9-3 7   ALBUMIN/GLOBULIN RATIO 1 7 (calc)  1 0-2 5   BILIRUBIN, TOTAL 0 6 mg/dL  0 2-1 2   ALKALINE PHOSPHATASE 62 U/L     AST 21 U/L  10-35   ALT 35 U/L  9-46     (1) LIPID PANEL, FASTING 14Oct2016 07:02AM [facility]     Test Name Result Flag Reference   CHOLESTEROL, TOTAL 152 mg/dL  125-200   HDL CHOLESTEROL 36 mg/dL L > OR = 40   TRIGLICERIDES 81 mg/dL  <092   LDL-CHOLESTEROL 100 mg/dL (calc)  <130   Desirable range <100 mg/dL for patients with CHD or  diabetes and <70 mg/dL for diabetic patients with  known heart disease  CHOL/HDLC RATIO 4 2 (calc)  < OR = 5 0   NON HDL CHOLESTEROL 116 mg/dL (calc)     Target for non-HDL cholesterol is 30 mg/dL higher than   LDL cholesterol target         Signatures   Electronically signed by : Wally Smith DO; Oct 16 2016  9:26AM EST                       (Author)

## 2018-01-16 NOTE — PROGRESS NOTES
Assessment    1  Encounter for preventive health examination (V70 0) (Z00 00)    Plan  Diabetes mellitus type 2, uncontrolled, Elevated blood sugar, Encounter for prostate  cancer screening    · (1) HEMOGLOBIN A1C; Status:Active; Requested for:23Dav5931;   Diabetes mellitus type 2, uncontrolled, Encounter for prostate cancer screening,  Hyperlipidemia, Hypertension, Hypothyroidism, postradioiodine therapy    · (1) PSA (SCREEN) (Dx V76 44 Screen for Prostate Cancer); Status:Active; Requested  for:72Gat0716;   Diabetes mellitus type 2, uncontrolled, Hyperlipidemia, Hypertension, Hypothyroidism,  postradioiodine therapy    · (1) LIPID PANEL FASTING W DIRECT LDL REFLEX; Status:Active; Requested  for:02Vrf7977;   Encounter for prostate cancer screening    · (1) COMPREHENSIVE METABOLIC PANEL; Status:Active; Requested for:93Rsw9394;     Discussion/Summary  health maintenance visit eats an adequate diet Currently, he eats a healthy diet  Prostate cancer screening: PSA was ordered  Testicular cancer screening: testicular cancer screening is current  Colorectal cancer screening: colorectal cancer screening is current  The immunizations are up to date  Advice and education were given regarding nutrition and aerobic exercise  Patient discussion: discussed with the patient  COLONOSCOPY UP TO DATE  PSA AND OTHER BLOODWORK ORDERED  FILLED OUT FORM FOR WORK, PENDING BLOODWORK  Chief Complaint  Wellness  History of Present Illness  HM, Adult Male: The patient is being seen for a health maintenance evaluation  The last health maintenance visit was 1 year(s) ago  General Health: The patient's health since the last visit is described as good  He has regular dental visits  He denies vision problems  Lifestyle:  He consumes a diverse and healthy diet  He does not have any weight concerns  He exercises regularly  He does not use tobacco  He denies alcohol use     Screening:   HPI: patient is here for a wellness  he has diabetes which is managed by endocrinology  his blood pressure is under control  he has some form to fill out for wellness for work, needs bloodwork      Review of Systems    Constitutional: no fever, not feeling poorly, no chills and not feeling tired  Eyes: no eye pain, no eyesight problems, eyes not red and no purulent discharge from the eyes  ENT: no earache, no nosebleeds, no hearing loss and no nasal discharge  Cardiovascular: the heart rate was not slow, no chest pain, the heart rate was not fast and no palpitations  Respiratory: no shortness of breath, no cough, no wheezing and no shortness of breath during exertion  Gastrointestinal: no abdominal pain, no nausea, no constipation and no diarrhea  Musculoskeletal: no arthralgias, no joint swelling and no myalgias  Integumentary: no itching, no skin lesions and no skin wound  Neurological: no numbness and no dizziness  Psychiatric: depression, but no anxiety  Endocrine: no muscle weakness and no erectile dysfunction  Active Problems    1  Abdominal discomfort (789 00) (R10 9)   2  Abdominal pain (789 00) (R10 9)   3  Abdominal wall hernia (553 20) (K43 9)   4  Abnormal ECG (794 31) (R94 31)   5  Abnormal weight loss (783 21) (R63 4)   6  Allergic rhinitis (477 9) (J30 9)   7  Anxiety (300 00) (F41 9)   8  Atypical chest pain (786 59) (R07 89)   9  Bilateral leg edema (782 3) (R60 0)   10  Bloating (787 3) (R14 0)   11  Change in bowel habits (787 99) (R19 4)   12  Cholelithiasis (574 20) (K80 20)   13  Colon cancer screening (V76 51) (Z12 11)   14  Colon polyps (211 3) (K63 5)   15  Diabetes mellitus type 2, uncontrolled (250 02) (E11 65)   16  Elevated blood sugar (790 29) (R73 9)   17  Fatigue (780 79) (R53 83)   18  First degree AV block (426 11) (I44 0)   19  Flatus (787 3) (R14 3)   20  Flu vaccine need (V04 81) (Z23)   21  Gall bladder polyp (575 6) (K82 4)   22  Gallbladder disease (575 9) (K82 9)   23   Gallstone Ileus (560 31)   24  Headache (784 0) (R51)   25  Hernia (553 9) (K46 9)   26  Hyperlipidemia (272 4) (E78 5)   27  Hypertension (401 9) (I10)   28  Hypothyroidism, postradioiodine therapy (244 1) (E89 0)   29  Incomplete emptying of bladder (788 21) (R33 9)   30  Insomnia (780 52) (G47 00)   31  Irregular bowel habits (569 89) (R19 8)   32  Lump of skin (782 2) (R22 9)   33  Migraines (346 90) (G43 909)   34  Myalgia (729 1) (M79 1)   35  Need for 23-polyvalent pneumococcal polysaccharide vaccine (V03 82) (Z23)   36  Obesity (278 00) (E66 9)   37  Obstructive sleep apnea (327 23) (G47 33)   38  Pharyngitis (462) (J02 9)   39  Post-void dribbling (788 35) (N39 43)   40  Preop examination (V72 84) (Z01 818)   41  Prolonged Q-T interval on ECG (794 31) (R94 31)   42  Restless leg syndrome (333 94) (G25 81)   43  Shortness of breath (786 05) (R06 02)   44  Sinus bradycardia (427 89) (R00 1)   45  Tachycardia (785 0) (R00 0)   46  Thyroid nodule (241 0) (E04 1)   47  Thyromegaly (240 9) (E04 9)   48  Trapezius muscle spasm (728 85) (M62 838)   49  Umbilical hernia (472 2) (K42 9)   50  Urine abnormality (791 9) (R82 90)   51  Venous insufficiency (459 81) (I87 2)   52  Viral bronchitis (466 0) (J20 8)   53   Wheezing (786 07) (R06 2)    Past Medical History    · History of hyperglycemia (V12 29) (Z86 39)   · History of hyperthyroidism (V12 29) (Z86 39)   · History of thyroid disease (V12 29) (Z86 39)   · History of Hyperthyroidism (242 90) (E05 90)   · History of Irregular heart beat (427 9) (I49 9)   · History of Venous insufficiency (459 81) (I87 2)   · History of Wheezing (786 07) (R06 2)    Surgical History    · History of Anastomosis Of Gallbladder   · History of Hernia Repair   · History of Surgery Vas Deferens Vasectomy    Family History  Mother    · Denied: Family history of Colon cancer   · Denied: Family history of colonic polyps   · Denied: Family history of liver disease   · Family history of lung cancer (V16 1) (Z80 1)   · Family history of Lung Cancer (V16 1)  Father    · Denied: Family history of Colon cancer   · Denied: Family history of colonic polyps   · Denied: Family history of liver disease  Family History    · Family history of Family Health Status Of Father - Alive   · Family history of Family Health Status Of Mother -    · Family history of Prostate Cancer (V16 42)    Social History    · Denied: History of Being A Social Drinker   · Caffeine Use   · Denied: History of Drug Use   · Job Physical Requirements Heavy Lifting   · Non-smoker (V49 89) (Z78 9)   · Occasional alcohol use   · Work-related Stress (V62 1)    Current Meds   1  FreeStyle Lancets Miscellaneous; Use once a day; Therapy: 07GJP6574 to (Last Rx:2017)  Requested for: 2017 Ordered   2  FreeStyle Lite Test In Vitro Strip; TEST ONCE DAILY; Therapy: 58RBF4257 to (Evaluate:2018)  Requested for: 2017; Last   Rx:2017 Ordered   3  Gabapentin 100 MG Oral Capsule; 1-2 capsules by mouth at bedtime; Therapy: 15UQG1230 to (Evaluate:2018)  Requested for: 2017; Last   Rx:2017 Ordered   4  Levothyroxine Sodium 125 MCG Oral Tablet; TAKE 1 TABLET DAILY; Therapy: 99ZMM5028 to (Evaluate:2018)  Requested for: 89NLS0899; Last   Rx:2017 Ordered   5  Lisinopril 20 MG Oral Tablet; TAKE 1 TABLET DAILY; Therapy: 77RSL4178 to ([phone])  Requested for: 62HOS1954; Last   Rx:80Ihz1555 Ordered   6  MetFORMIN HCl - 1000 MG Oral Tablet; take 1 tablet by mouth twice a day; Therapy: 43HTZ9907 to (Last NL:53XWI9131)  Requested for: 96NHX5381 Ordered   7  MetFORMIN HCl ER (OSM) 500 MG Oral Tablet Extended Release 24 Hour; TAKE 2   TABLETS BY MOUTH TWO TIMES DAILY; Therapy: 90MLK5199 to (Evaluate:2017)  Requested for: 35DLL9137; Last   Rx:2017; Status: ACTIVE - Renewal Voided Ordered   8  Rizatriptan Benzoate 10 MG Oral Tablet Disintegrating; TAKE 1 TABLET AT ONSET OF   HEADACHE   MAY REPEAT EVERY 2 HOURS AS NEEDED  MAXIMUM 3 TABLETS IN 24   HOURS; Therapy: 08ODQ0945 to (Evaluate:05Jan2017)  Requested for: 75FHT4876; Last   Rx:11Jan2016 Ordered   9  Rosuvastatin Calcium 5 MG Oral Tablet; TAKE 1 TABLET DAILY; Therapy: 19UMG1029 to (Evaluate:89Lie8149)  Requested for: 71Swm1898; Last   Rx:55Tck3882 Ordered   10  Suprep Bowel Prep Kit 17 5-3 13-1 6 GM/180ML Oral Solution; USE AS DIRECTED; Therapy: 87VID6471 to (Last Rx:32Wye6379)  Requested for: 51OHF8091 Ordered    Allergies    1  No Known Drug Allergies    2  Seasonal    Vitals   Recorded: 14Aug2017 04:21PM   Temperature 98 2 F   Heart Rate 57   Respiration 16   Systolic 913   Diastolic 84   Height 6 ft 2 in   Weight 266 lb 0 4 oz   BMI Calculated 34 16   BSA Calculated 2 45   O2 Saturation 98     Physical Exam    Constitutional   General appearance: No acute distress, well appearing and well nourished  Eyes   Conjunctiva and lids: No swelling, erythema, or discharge  Pupils and irises: Equal, round and reactive to light  Ears, Nose, Mouth, and Throat   External inspection of ears and nose: Normal     Otoscopic examination: Tympanic membrance translucent with normal light reflex  Canals patent without erythema  Oropharynx: Normal with no erythema, edema, exudate or lesions  Pulmonary   Respiratory effort: No increased work of breathing or signs of respiratory distress  Auscultation of lungs: Clear to auscultation  Cardiovascular   Palpation of heart: Normal PMI, no thrills  Auscultation of heart: Normal rate and rhythm, normal S1 and S2, without murmurs  Examination of extremities for edema and/or varicosities: Normal     Abdomen   Abdomen: Non-tender, no masses  Liver and spleen: No hepatomegaly or splenomegaly  Lymphatic   Palpation of lymph nodes in neck: No lymphadenopathy  Musculoskeletal   Gait and station: Normal     Digits and nails: Normal without clubbing or cyanosis      Inspection/palpation of joints, bones, and muscles: Normal     Skin   Skin and subcutaneous tissue: Normal without rashes or lesions  Psychiatric   Orientation to person, place and time: Normal     Mood and affect: Normal        Health Management  Colon polyps   COLONOSCOPY (GI, SURG); every 3 years; Last 20MTF7229; Next Due: 53Etg4198; Active    Future Appointments    Date/Time Provider Specialty Site   09/27/2017 05:30 PM Shayna Caballero DO Internal Medicine [facility]   10/10/2017 04:45 PM Balbir Andres, [address] [facility] [address] [place] [address] ENDOCRINOLOGY     Signatures   Electronically signed by : Jacques Martínez;  Aug 18 2017 11:21AM EST                       (Author)    Electronically signed by : KEN Lackey ; Aug 27 2017  4:59PM EST                       (Review)

## 2018-01-17 DIAGNOSIS — E89.0 POSTPROCEDURAL HYPOTHYROIDISM: ICD-10-CM

## 2018-01-17 DIAGNOSIS — E11.65 TYPE 2 DIABETES MELLITUS WITH HYPERGLYCEMIA (HCC): ICD-10-CM

## 2018-01-17 DIAGNOSIS — I10 ESSENTIAL (PRIMARY) HYPERTENSION: ICD-10-CM

## 2018-01-18 NOTE — MISCELLANEOUS
Provider Comments  Provider Comments:   Pt was a n/s  Called phone on file, mailbox is full not able to LM        Signatures   Electronically signed by : Dellia Gosselin, DO; Jan 13 2017  9:17PM EST                       (Author)

## 2018-01-18 NOTE — RESULT NOTES
Message   notify the patient normal comprehensive metabolic panel except for mildly elevated blood sugar please have the patient reduce carbohydrates and sweets follow up as scheduled        Verified Results  (1) LIPID PANEL, FASTING 30PNH2431 07:39AM [facility]     Test Name Result Flag Reference   CHOLESTEROL, TOTAL 132 mg/dL  125-200   HDL CHOLESTEROL 36 mg/dL L > OR = 40   TRIGLICERIDES 54 mg/dL  <166   LDL-CHOLESTEROL 85 mg/dL (calc)  <130   Desirable range <100 mg/dL for patients with CHD or  diabetes and <70 mg/dL for diabetic patients with  known heart disease  CHOL/HDLC RATIO 3 7 (calc)  < OR = 5 0   NON HDL CHOLESTEROL 96 mg/dL (calc)     Target for non-HDL cholesterol is 30 mg/dL higher than   LDL cholesterol target  (Q) MICROALBUMIN, RANDOM URINE (W/CREATININE) 60VFR5968 07:39AM [facility]     Test Name Result Flag Reference   CREATININE, RANDOM URINE 128 mg/dL     MICROALBUMIN 0 3 mg/dL     Reference Range  Not established   MICROALBUMIN/CREATININE$RATIO, RANDOM URINE 2 mcg/mg creat  <30   The ADA defines abnormalities in albumin  excretion as follows:     Category         Result (mcg/mg creatinine)     Normal                    <30  Microalbuminuria            Clinical albuminuria   > OR = 300     The ADA recommends that at least two of three  specimens collected within a 3-6 month period be  abnormal before considering a patient to be  within a diagnostic category       (1) COMPREHENSIVE METABOLIC PANEL 92LHP6063 04:24OL [facility]     Test Name Result Flag Reference   GLUCOSE 112 mg/dL H 65-99   Fasting reference interval     For someone without known diabetes, a glucose value  between 100 and 125 mg/dL is consistent with  prediabetes and should be confirmed with a  follow-up test    UREA NITROGEN (BUN) 15 mg/dL  7-25   CREATININE 1 03 mg/dL  0 70-1 33   For patients >52years of age, the reference limit  for Creatinine is approximately 13% higher for people  identified as -American  eGFR NON-AFR   AMERICAN 84 mL/min/1 73m2  > OR = 60   eGFR AFRICAN AMERICAN 98 mL/min/1 73m2  > OR = 60   BUN/CREATININE RATIO   1-92   NOT APPLICABLE (calc)   SODIUM 141 mmol/L  135-146   POTASSIUM 4 0 mmol/L  3 5-5 3   CHLORIDE 104 mmol/L     CARBON DIOXIDE 29 mmol/L  20-31   CALCIUM 9 4 mg/dL  8 6-10 3   PROTEIN, TOTAL 6 9 g/dL  6 1-8 1   ALBUMIN 4 5 g/dL  3 6-5 1   GLOBULIN 2 4 g/dL (calc)  1 9-3 7   ALBUMIN/GLOBULIN RATIO 1 9 (calc)  1 0-2 5   BILIRUBIN, TOTAL 0 5 mg/dL  0 2-1 2   ALKALINE PHOSPHATASE 73 U/L     AST 20 U/L  10-35   ALT 30 U/L  9-46       Signatures   Electronically signed by : Nury Miller DO; May 30 2017  7:34PM EST                       (Author)

## 2018-01-22 VITALS
SYSTOLIC BLOOD PRESSURE: 120 MMHG | HEIGHT: 74 IN | BODY MASS INDEX: 34.14 KG/M2 | DIASTOLIC BLOOD PRESSURE: 84 MMHG | WEIGHT: 266.02 LBS | TEMPERATURE: 98.2 F | OXYGEN SATURATION: 98 % | HEART RATE: 57 BPM | RESPIRATION RATE: 16 BRPM

## 2018-01-23 VITALS
WEIGHT: 275 LBS | HEART RATE: 82 BPM | DIASTOLIC BLOOD PRESSURE: 88 MMHG | HEIGHT: 74 IN | SYSTOLIC BLOOD PRESSURE: 120 MMHG | BODY MASS INDEX: 35.29 KG/M2

## 2018-02-20 RX ORDER — SERTRALINE HYDROCHLORIDE 25 MG/1
1 TABLET, FILM COATED ORAL DAILY
COMMUNITY
Start: 2017-09-27 | End: 2018-02-21 | Stop reason: SDUPTHER

## 2018-02-20 RX ORDER — LANCETS 28 GAUGE
EACH MISCELLANEOUS DAILY
COMMUNITY
Start: 2017-04-06 | End: 2020-08-03

## 2018-02-21 ENCOUNTER — OFFICE VISIT (OUTPATIENT)
Dept: INTERNAL MEDICINE CLINIC | Facility: CLINIC | Age: 53
End: 2018-02-21
Payer: COMMERCIAL

## 2018-02-21 VITALS
RESPIRATION RATE: 16 BRPM | HEART RATE: 78 BPM | BODY MASS INDEX: 32.34 KG/M2 | OXYGEN SATURATION: 96 % | DIASTOLIC BLOOD PRESSURE: 84 MMHG | WEIGHT: 252 LBS | SYSTOLIC BLOOD PRESSURE: 124 MMHG | HEIGHT: 74 IN

## 2018-02-21 DIAGNOSIS — E03.9 HYPOTHYROIDISM, UNSPECIFIED TYPE: Primary | ICD-10-CM

## 2018-02-21 DIAGNOSIS — E78.5 HYPERLIPIDEMIA, UNSPECIFIED HYPERLIPIDEMIA TYPE: ICD-10-CM

## 2018-02-21 DIAGNOSIS — IMO0002 UNCONTROLLED TYPE 2 DIABETES MELLITUS WITH COMPLICATION, WITHOUT LONG-TERM CURRENT USE OF INSULIN: ICD-10-CM

## 2018-02-21 DIAGNOSIS — F32.A DEPRESSION, UNSPECIFIED DEPRESSION TYPE: ICD-10-CM

## 2018-02-21 PROCEDURE — 99214 OFFICE O/P EST MOD 30 MIN: CPT | Performed by: INTERNAL MEDICINE

## 2018-02-21 NOTE — PROGRESS NOTES
Assessment/Plan:         Diagnoses and all orders for this visit:    Hypothyroidism, unspecified type  -     levothyroxine 125 mcg tablet; Take 1 tablet (125 mcg total) by mouth daily    Uncontrolled type 2 diabetes mellitus with complication, without long-term current use of insulin (HCC)  -     metFORMIN (GLUCOPHAGE) 1000 MG tablet; Take 1 tablet (1,000 mg total) by mouth 2 (two) times a day with meals    Hyperlipidemia, unspecified hyperlipidemia type  -     rosuvastatin (CRESTOR) 5 mg tablet; Take 1 tablet (5 mg total) by mouth daily    Depression, unspecified depression type  -     sertraline (ZOLOFT) 25 mg tablet; Take 1 tablet (25 mg total) by mouth daily        Assessment and plan 1  Hypothyroidism refilled levothyroxine 125 mcg 1 tablet per day will check 3rd generation TSH 2  Type 2 diabetes refilled Glucophage patient has not gone for laboratories will be checking comprehensive metabolic panel, hemoglobin A1c, lipid panel and urine microalbumin; I have counselled the pt to follow a healthy and balanced diet ,and recommend routine exercise  3   Hyperlipidemia recommend a low-cholesterol diet will check a lipid profile 4  Anxiety/depression stable, no suicidal ideation the patient reports me that his wife had left him the other day she had been having an affair with his best friend for last 3 years, patient reports me he made a mistake telling his children, now she is angry and leaving, during the office visit he received a phone call from her she is packing her bags and currently leaving the house because of this the patient needed to leave abruptly he reports to me no suicidal ideation, no homicidal ideation he does not report any concern of abuse, I did explain to the patient if he felt threatened notify 911; the patient reported to me there would be no problem  Subjective:      Patient ID: Kyung Guerrero is a 47 y o  male      HPI [age]-year old male coming in for a follow up visit regarding hypothyroidism, type 2 diabetes, hyperlipidemia and depression; the patient reports me that pt found out wife having an affair, wife now home last ,  No hi  No si , family support, eating less watching the soda  The patient reports me compliant taking medications without untoward side effects the  The patient is here to review his medical condition, update me on the medical condition and the patient reports me no hospitalizations and no ER visits  Review of Systems      Objective:      /84 (BP Location: Left arm, Patient Position: Sitting, Cuff Size: Standard)   Pulse 78   Resp 16   Ht 6' 2" (1 88 m)   Wt 114 kg (252 lb)   SpO2 96%   BMI 32 35 kg/m²                       No Follow-up on file  Allergies   Allergen Reactions    Pollen Extract        Past Medical History:   Diagnosis Date    Diabetes mellitus ([facility] [address] )     Disease of thyroid gland     hypothyroid graves disease treated with radiation    Hyperlipidemia     Hypertension     Hyperthyroidism     L A  6/8/14    R    9/10/14      Irregular heart beat     Obesity     Sleep apnea     uses C-Pap machine    Venous insufficiency     L A 10/14/16   R    1/20/17       Past Surgical History:   Procedure Laterality Date    CHOLECYSTECTOMY      GALLBLADDER SURGERY      Anastomosis    ND COLONOSCOPY FLX DX W/COLLJ SPEC WHEN PFRMD N/A 7/19/2017    Procedure: COLONOSCOPY;  Surgeon: Lorenzo Conley MD;  [facility];   Service: Gastroenterology    UMBILICAL HERNIA REPAIR      VASCULAR SURGERY      VASECTOMY      vas deferens      Current Outpatient Prescriptions on File Prior to Visit   Medication Sig Dispense Refill    gabapentin (NEURONTIN) 100 mg capsule Take 200 mg by mouth 3 (three) times a day      glucose blood (FREESTYLE LITE) test strip by In Vitro route daily      Lancets (FREESTYLE) lancets by Does not apply route daily      levothyroxine 125 mcg tablet Take 125 mcg by mouth daily      lisinopril (ZESTRIL) 20 mg tablet Take 20 mg by mouth daily      metFORMIN (GLUCOPHAGE) 1000 MG tablet Take 1,000 mg by mouth 2 (two) times a day with meals      Na Sulfate-K Sulfate-Mg Sulf (SUPREP BOWEL PREP KIT) 17 5-3 13-1 6 GM/180ML SOLN Take by mouth      rizatriptan (MAXALT) 10 MG tablet Take 10 mg by mouth once as needed for migraine May repeat in 2 hours if needed      rosuvastatin (CRESTOR) 5 mg tablet Take 5 mg by mouth daily      sertraline (ZOLOFT) 25 mg tablet Take 1 tablet by mouth daily       No current facility-administered medications on file prior to visit        Family History   Problem Relation Age of Onset    Lung cancer Mother     Prostate cancer Family      Social History     Social History    Marital status: /Civil Union     Spouse name: N/A    Number of children: N/A    Years of education: N/A     Occupational History          job physical requirements heavy lifting     Social History Main Topics    Smoking status: Never Smoker    Smokeless tobacco: Never Used    Alcohol use Yes      Comment: rare / denied social drinker per allscript    Drug use: No    Sexual activity: Not on file     Other Topics Concern    Not on file     Social History Narrative    Caffeine use    Work related stress     Vitals:    02/21/18 1629   BP: 124/84   BP Location: Left arm   Patient Position: Sitting   Cuff Size: Standard   Pulse: 78   Resp: 16   SpO2: 96%   Weight: 114 kg (252 lb)   Height: 6' 2" (1 88 m)     Results for orders placed or performed in visit on 10/17/17   HEMOGLOBIN A1C (HISTORICAL)   Result Value Ref Range    Hemoglobin A1C 5 6 <5 7 % of total Hgb   T4, FREE (HISTORICAL)   Result Value Ref Range    Free T4 1 6 0 8 - 1 8 ng/dL   COMPREHENSIVE METABOLIC PANEL (HISTORICAL)   Result Value Ref Range    Glucose 110 (H) 65 - 99 mg/dL    BUN 18 7 - 25 mg/dL    Creatinine 1 16 0 70 - 1 33 mg/dL    EGFR-AMERICAN CALC 73 > OR = 60 mL/min/1 73m2    eGFR  84 > OR = 60 mL/min/1 73m2 BUN/CREA Ratio NOT APPLICABLE 6 - 22 (calc)    Sodium 142 135 - 146 mmol/L    Potassium 4 1 3 5 - 5 3 mmol/L    Chloride 104 98 - 110 mmol/L    CO2 32 (H) 20 - 31 mmol/L    Calcium 9 8 8 6 - 10 3 mg/dL    Total Protein 6 7 6 1 - 8 1 g/dL    Albumin 4 4 3 6 - 5 1 g/dL    GAMMA GLOBULIN 2 3 1 9 - 3 7 g/dL (calc)    A/G RATIO 1 9 1 0 - 2 5 (calc)    Total Bilirubin 0 5 0 2 - 1 2 mg/dL    Alkaline Phosphatase 59 40 - 115 U/L    AST 15 10 - 35 U/L    ALT 22 9 - 46 U/L     Weight (last 2 days)     Date/Time   Weight    02/21/18 1629  114 (252)            Body mass index is 32 35 kg/m²  /84 (02/21/18 1629)    Temp      Pulse 78 (02/21/18 1629)   Resp 16 (02/21/18 1629)    SpO2 96 % (02/21/18 1629)      Vitals:    02/21/18 1629   Weight: 114 kg (252 lb)     Vitals:    02/21/18 1629   Weight: 114 kg (252 lb)      Physical Exam   Constitutional: He appears well-developed and well-nourished  No distress  HENT:   Head: Normocephalic and atraumatic  Right Ear: External ear normal    Left Ear: External ear normal    Mouth/Throat: Oropharynx is clear and moist    Eyes: Conjunctivae are normal  Pupils are equal, round, and reactive to light  Right eye exhibits no discharge  Left eye exhibits no discharge  No scleral icterus  Neck: Neck supple  Cardiovascular: Normal rate, regular rhythm and normal heart sounds  Exam reveals no gallop and no friction rub  No murmur heard  Pulmonary/Chest: No respiratory distress  He has no wheezes  He has no rales  Abdominal: Soft  Bowel sounds are normal  He exhibits no distension and no mass  There is no tenderness  There is no rebound and no guarding  Musculoskeletal: He exhibits no edema or deformity  Lymphadenopathy:     He has no cervical adenopathy  Neurological: He is alert  Skin: He is not diaphoretic  Psychiatric: He has a normal mood and affect

## 2018-02-25 RX ORDER — LEVOTHYROXINE SODIUM 0.12 MG/1
125 TABLET ORAL DAILY
Qty: 90 TABLET | Refills: 1 | Status: SHIPPED | OUTPATIENT
Start: 2018-02-25 | End: 2018-03-10 | Stop reason: SDUPTHER

## 2018-02-25 RX ORDER — ROSUVASTATIN CALCIUM 5 MG/1
5 TABLET, COATED ORAL DAILY
Qty: 90 TABLET | Refills: 1 | Status: SHIPPED | OUTPATIENT
Start: 2018-02-25 | End: 2018-09-26 | Stop reason: SDUPTHER

## 2018-03-09 DIAGNOSIS — E04.1 NONTOXIC SINGLE THYROID NODULE: ICD-10-CM

## 2018-03-10 DIAGNOSIS — G25.81 RESTLESS LEG SYNDROME: Primary | ICD-10-CM

## 2018-03-10 DIAGNOSIS — E03.9 HYPOTHYROIDISM, UNSPECIFIED TYPE: ICD-10-CM

## 2018-03-10 RX ORDER — GABAPENTIN 100 MG/1
CAPSULE ORAL
Qty: 180 CAPSULE | Refills: 3 | Status: SHIPPED | OUTPATIENT
Start: 2018-03-10 | End: 2018-08-29 | Stop reason: SDUPTHER

## 2018-03-10 RX ORDER — LEVOTHYROXINE SODIUM 0.12 MG/1
TABLET ORAL
Qty: 90 TABLET | Refills: 3 | Status: SHIPPED | OUTPATIENT
Start: 2018-03-10 | End: 2018-09-26 | Stop reason: SDUPTHER

## 2018-05-09 DIAGNOSIS — I10 ESSENTIAL HYPERTENSION: Primary | ICD-10-CM

## 2018-05-09 RX ORDER — LISINOPRIL 20 MG/1
20 TABLET ORAL DAILY
Qty: 30 TABLET | Refills: 5 | Status: SHIPPED | OUTPATIENT
Start: 2018-05-09 | End: 2018-07-24 | Stop reason: SDUPTHER

## 2018-07-23 RX ORDER — SERTRALINE HYDROCHLORIDE 25 MG/1
25 TABLET, FILM COATED ORAL DAILY
Refills: 3 | COMMUNITY
Start: 2018-05-28 | End: 2018-07-24 | Stop reason: ALTCHOICE

## 2018-07-24 ENCOUNTER — OFFICE VISIT (OUTPATIENT)
Dept: INTERNAL MEDICINE CLINIC | Facility: CLINIC | Age: 53
End: 2018-07-24
Payer: COMMERCIAL

## 2018-07-24 VITALS
WEIGHT: 278.6 LBS | SYSTOLIC BLOOD PRESSURE: 126 MMHG | BODY MASS INDEX: 35.77 KG/M2 | DIASTOLIC BLOOD PRESSURE: 80 MMHG | OXYGEN SATURATION: 97 % | HEART RATE: 71 BPM

## 2018-07-24 DIAGNOSIS — E66.01 CLASS 2 SEVERE OBESITY DUE TO EXCESS CALORIES WITH SERIOUS COMORBIDITY AND BODY MASS INDEX (BMI) OF 35.0 TO 35.9 IN ADULT (HCC): ICD-10-CM

## 2018-07-24 DIAGNOSIS — E13.9 DIABETES 1.5, MANAGED AS TYPE 2 (HCC): ICD-10-CM

## 2018-07-24 DIAGNOSIS — E78.5 HYPERLIPIDEMIA, UNSPECIFIED HYPERLIPIDEMIA TYPE: ICD-10-CM

## 2018-07-24 DIAGNOSIS — I10 ESSENTIAL HYPERTENSION: Primary | ICD-10-CM

## 2018-07-24 DIAGNOSIS — E03.9 HYPOTHYROIDISM, UNSPECIFIED TYPE: ICD-10-CM

## 2018-07-24 DIAGNOSIS — F41.9 ANXIETY: ICD-10-CM

## 2018-07-24 DIAGNOSIS — Z12.5 SCREENING PSA (PROSTATE SPECIFIC ANTIGEN): ICD-10-CM

## 2018-07-24 DIAGNOSIS — IMO0001 UNCONTROLLED TYPE 2 DIABETES MELLITUS WITHOUT COMPLICATION, WITHOUT LONG-TERM CURRENT USE OF INSULIN: ICD-10-CM

## 2018-07-24 PROBLEM — IMO0002 DIABETES MELLITUS TYPE 2, UNCONTROLLED: Status: ACTIVE | Noted: 2017-02-14

## 2018-07-24 PROCEDURE — 99214 OFFICE O/P EST MOD 30 MIN: CPT | Performed by: INTERNAL MEDICINE

## 2018-07-24 RX ORDER — VENLAFAXINE HYDROCHLORIDE 37.5 MG/1
37.5 CAPSULE, EXTENDED RELEASE ORAL DAILY
Qty: 30 CAPSULE | Refills: 3 | Status: SHIPPED | OUTPATIENT
Start: 2018-07-24 | End: 2018-08-29 | Stop reason: SINTOL

## 2018-07-24 RX ORDER — LISINOPRIL 10 MG/1
10 TABLET ORAL DAILY
Qty: 30 TABLET | Refills: 4 | Status: SHIPPED | OUTPATIENT
Start: 2018-07-24 | End: 2018-09-20 | Stop reason: SDUPTHER

## 2018-07-24 NOTE — PROGRESS NOTES
Assessment/Plan:           Problem List Items Addressed This Visit        Endocrine    Diabetes mellitus type 2, uncontrolled ([facility] [address] )     Lab Results   Component Value Date    HGBA1C 5 6 10/16/2017       No results for input(s): POCGLU in the last 72 hours  Blood Sugar Average: Last 72 hrs:   I have counseled patient to follow a healthy/balance diet would like the patient reduce carbohydrates and sweets, I would like the patient to limit a soda, I would like the patient to exercise routinely and lose weight  I will be ordering diabetic laboratories including comprehensive metabolic panel, hemoglobin A1c, urine microalbumin, lipid panel  Cardiovascular and Mediastinum    Hypertension - Primary     Hypertension - controlled, I have counseled patient following healthy balance diet, I would like the patient reduce sodium, exercise routinely, I would like the patient continued the med current medical regiment and we will continue to monitor  He does mention some mild dizziness when bending over or changing position therefore we will decrease the lisinopril to 10 mg once daily he is to monitor blood pressure routinely and if the blood pressure increases he is notify he is to drink 4-6 glasses water daily           Relevant Medications    venlafaxine (EFFEXOR-XR) 37 5 mg 24 hr capsule    lisinopril (ZESTRIL) 10 mg tablet    Other Relevant Orders    Comprehensive metabolic panel    Hemoglobin A1C    Lipid Panel with Direct LDL reflex    Microalbumin / creatinine urine ratio       Other    Anxiety     Anxiety and depression, no suicidal ideation he reports me that the Zoloft is not effective as it used to be he is unable to increase because of increasing fatigue I will have him wean off Zoloft 12 5 mg once a day x1 week and discontinue medication we will start the patient on Effexor XR 37 5 mg 1 tab once a day I reviewed the risks benefits and side effects of the medication, if any problems he is to notify me immediately  The patient declines going for counseling I have counseled the patient today  Return to office 1  months  call if any problems         Relevant Medications    venlafaxine (EFFEXOR-XR) 37 5 mg 24 hr capsule    Hyperlipidemia     Will check a lipid profile recommend a healthy/balance diet         Obesity     Obesity -I have counseled patient following healthy and balanced diet, I would like the patient to lose weight, I would like the patient exercise routinely; we will continue monitor the patient's progress  Other Visit Diagnoses     Diabetes 1 5, managed as type 2 ([facility] [address] )        Relevant Medications    venlafaxine (EFFEXOR-XR) 37 5 mg 24 hr capsule    Other Relevant Orders    Comprehensive metabolic panel    Hemoglobin A1C    Lipid Panel with Direct LDL reflex    Microalbumin / creatinine urine ratio    Hypothyroidism, unspecified type        Relevant Orders    TSH, 3rd generation    Screening PSA (prostate specific antigen)        Relevant Orders    PSA, Total Screen          Return to office 1  months  call if any problems  Subjective:      Patient ID: Maria De Jesus Hanna is a 47 y o  male  HPI 54-year old male coming in for a follow up visit regarding essential hypertension, anxiety, type 2 diabetes, hypothyroidism, obesity, hyperlipidemia; The patient reports me compliant taking medications without untoward side effects the  The patient is here to review his medical condition, update me on the medical condition and the patient reports me no hospitalizations and no ER visits  Patient does report me ongoing symptoms of anxiety depression no SI no hi depression / worthless "my wife does not love me any more" they are both currently living together  There are no guns in the house, he reports to me there is no physical abuse  He has been drinking multiple [employer] sodas every day now he has gained weight and has not been following his diet    He is concerned that his blood sugar is out of control and has not been checking his blood sugar  The following portions of the patient's history were reviewed and updated as appropriate: allergies, current medications, past family history, past social history, past surgical history and problem list     Review of Systems   Constitutional: Negative for activity change, appetite change and unexpected weight change  HENT: Negative for congestion and postnasal drip  Eyes: Negative for visual disturbance  Respiratory: Negative for cough and shortness of breath  Cardiovascular: Negative for chest pain  Gastrointestinal: Negative for abdominal pain, diarrhea, nausea and vomiting  Neurological: Negative for dizziness, light-headedness and headaches  Hematological: Negative for adenopathy  Psychiatric/Behavioral: Positive for self-injury  Negative for suicidal ideas  Depression         Objective:      /80   Pulse 71   Wt 126 kg (278 lb 9 6 oz)   SpO2 97%   BMI 35 77 kg/m²                       No Follow-up on file  Allergies   Allergen Reactions    Pollen Extract        Past Medical History:   Diagnosis Date    Diabetes mellitus ([facility] [address] )     Disease of thyroid gland     hypothyroid graves disease treated with radiation    Hyperlipidemia     Hypertension     Hyperthyroidism     L A  6/8/14    R    9/10/14      Irregular heart beat     Obesity     Sleep apnea     uses C-Pap machine    Venous insufficiency     L A 10/14/16   R    1/20/17       Past Surgical History:   Procedure Laterality Date    CHOLECYSTECTOMY      GALLBLADDER SURGERY      Anastomosis    CO COLONOSCOPY FLX DX W/COLLJ SPEC WHEN PFRMD N/A 7/19/2017    Procedure: COLONOSCOPY;  Surgeon: Rivas Lopez MD;  [facility];   Service: Gastroenterology    UMBILICAL HERNIA REPAIR      VASCULAR SURGERY      VASECTOMY      vas deferens      Current Outpatient Prescriptions on File Prior to Visit   Medication Sig Dispense Refill    gabapentin (NEURONTIN) 100 mg capsule 1-2 CAPSULES BY MOUTH AT BEDTIME 180 capsule 3    glucose blood (FREESTYLE LITE) test strip by In Vitro route daily      Lancets (FREESTYLE) lancets by Does not apply route daily      levothyroxine 125 mcg tablet TAKE 1 TABLET DAILY  90 tablet 3    metFORMIN (GLUCOPHAGE) 1000 MG tablet Take 1 tablet (1,000 mg total) by mouth 2 (two) times a day with meals 180 tablet 1    Na Sulfate-K Sulfate-Mg Sulf (SUPREP BOWEL PREP KIT) 17 5-3 13-1 6 GM/180ML SOLN Take by mouth      rizatriptan (MAXALT) 10 MG tablet Take 10 mg by mouth once as needed for migraine May repeat in 2 hours if needed      rosuvastatin (CRESTOR) 5 mg tablet Take 1 tablet (5 mg total) by mouth daily 90 tablet 1    [DISCONTINUED] lisinopril (ZESTRIL) 20 mg tablet Take 1 tablet (20 mg total) by mouth daily 30 tablet 5    [DISCONTINUED] sertraline (ZOLOFT) 25 mg tablet Take 25 mg by mouth daily  3    [DISCONTINUED] sertraline (ZOLOFT) 50 mg tablet Take 1 tablet (50 mg total) by mouth daily 30 tablet 5     No current facility-administered medications on file prior to visit        Family History   Problem Relation Age of Onset    Lung cancer Mother     Prostate cancer Family      Social History     Social History    Marital status: /Civil Union     Spouse name: N/A    Number of children: N/A    Years of education: N/A     Occupational History          job physical requirements heavy lifting     Social History Main Topics    Smoking status: Never Smoker    Smokeless tobacco: Never Used    Alcohol use Yes      Comment: rare / denied social drinker per allscript    Drug use: No    Sexual activity: Not on file     Other Topics Concern    Not on file     Social History Narrative    Caffeine use    Work related stress     Vitals:    07/24/18 1634   BP: 126/80   Pulse: 71   SpO2: 97%   Weight: 126 kg (278 lb 9 6 oz)     Results for orders placed or performed in visit on 10/17/17   HEMOGLOBIN A1C (HISTORICAL) Result Value Ref Range    Hemoglobin A1C 5 6 <5 7 % of total Hgb   T4, FREE (HISTORICAL)   Result Value Ref Range    Free T4 1 6 0 8 - 1 8 ng/dL   COMPREHENSIVE METABOLIC PANEL (HISTORICAL)   Result Value Ref Range    Glucose 110 (H) 65 - 99 mg/dL    BUN 18 7 - 25 mg/dL    Creatinine 1 16 0 70 - 1 33 mg/dL    EGFR-AMERICAN CALC 73 > OR = 60 mL/min/1 73m2    eGFR  84 > OR = 60 mL/min/1 73m2    BUN/CREA Ratio NOT APPLICABLE 6 - 22 (calc)    Sodium 142 135 - 146 mmol/L    Potassium 4 1 3 5 - 5 3 mmol/L    Chloride 104 98 - 110 mmol/L    CO2 32 (H) 20 - 31 mmol/L    Calcium 9 8 8 6 - 10 3 mg/dL    Total Protein 6 7 6 1 - 8 1 g/dL    Albumin 4 4 3 6 - 5 1 g/dL    GAMMA GLOBULIN 2 3 1 9 - 3 7 g/dL (calc)    A/G RATIO 1 9 1 0 - 2 5 (calc)    Total Bilirubin 0 5 0 2 - 1 2 mg/dL    Alkaline Phosphatase 59 40 - 115 U/L    AST 15 10 - 35 U/L    ALT 22 9 - 46 U/L     Weight (last 2 days)     Date/Time   Weight    07/24/18 1634  126 (278 6)            Body mass index is 35 77 kg/m²  BP      Temp      Pulse     Resp      SpO2        Vitals:    07/24/18 1634   Weight: 126 kg (278 lb 9 6 oz)     Vitals:    07/24/18 1634   Weight: 126 kg (278 lb 9 6 oz)        Physical Exam   Constitutional: He appears well-developed and well-nourished  No distress  HENT:   Head: Normocephalic and atraumatic  Right Ear: External ear normal    Left Ear: External ear normal    Mouth/Throat: Oropharynx is clear and moist    Eyes: Conjunctivae are normal  Pupils are equal, round, and reactive to light  Right eye exhibits no discharge  Left eye exhibits no discharge  No scleral icterus  Neck: Neck supple  Cardiovascular: Normal rate, regular rhythm and normal heart sounds  Exam reveals no gallop and no friction rub  No murmur heard  Pulmonary/Chest: No respiratory distress  He has no wheezes  He has no rales  Abdominal: Soft  Bowel sounds are normal  He exhibits no distension and no mass  There is no tenderness   There is no rebound and no guarding  Musculoskeletal: He exhibits no edema or deformity  Lymphadenopathy:     He has no cervical adenopathy  Neurological: He is alert  Skin: He is not diaphoretic  Psychiatric: His mood appears anxious  He exhibits a depressed mood  He expresses no homicidal and no suicidal ideation

## 2018-07-25 NOTE — ASSESSMENT & PLAN NOTE
Hypertension - controlled, I have counseled patient following healthy balance diet, I would like the patient reduce sodium, exercise routinely, I would like the patient continued the med current medical regiment and we will continue to monitor  He does mention some mild dizziness when bending over or changing position therefore we will decrease the lisinopril to 10 mg once daily he is to monitor blood pressure routinely and if the blood pressure increases he is notify he is to drink 4-6 glasses water daily

## 2018-07-25 NOTE — ASSESSMENT & PLAN NOTE
Anxiety and depression, no suicidal ideation he reports me that the Zoloft is not effective as it used to be he is unable to increase because of increasing fatigue I will have him wean off Zoloft 12 5 mg once a day x1 week and discontinue medication we will start the patient on Effexor XR 37 5 mg 1 tab once a day I reviewed the risks benefits and side effects of the medication, if any problems he is to notify me immediately  The patient declines going for counseling I have counseled the patient today    Return to office 1  months  call if any problems

## 2018-07-25 NOTE — ASSESSMENT & PLAN NOTE
Lab Results   Component Value Date    HGBA1C 5 6 10/16/2017       No results for input(s): POCGLU in the last 72 hours  Blood Sugar Average: Last 72 hrs:   I have counseled patient to follow a healthy/balance diet would like the patient reduce carbohydrates and sweets, I would like the patient to limit a soda, I would like the patient to exercise routinely and lose weight  I will be ordering diabetic laboratories including comprehensive metabolic panel, hemoglobin A1c, urine microalbumin, lipid panel

## 2018-07-26 ENCOUNTER — OFFICE VISIT (OUTPATIENT)
Dept: INTERNAL MEDICINE CLINIC | Facility: CLINIC | Age: 53
End: 2018-07-26
Payer: COMMERCIAL

## 2018-07-26 VITALS
WEIGHT: 278 LBS | HEART RATE: 73 BPM | TEMPERATURE: 97.7 F | DIASTOLIC BLOOD PRESSURE: 80 MMHG | OXYGEN SATURATION: 95 % | HEIGHT: 74 IN | SYSTOLIC BLOOD PRESSURE: 128 MMHG | BODY MASS INDEX: 35.68 KG/M2

## 2018-07-26 DIAGNOSIS — E13.9 DIABETES 1.5, MANAGED AS TYPE 2 (HCC): Primary | ICD-10-CM

## 2018-07-26 DIAGNOSIS — Z00.00 WELLNESS EXAMINATION: ICD-10-CM

## 2018-07-26 DIAGNOSIS — G56.03 BILATERAL CARPAL TUNNEL SYNDROME: ICD-10-CM

## 2018-07-26 LAB
ALBUMIN SERPL-MCNC: 4.2 G/DL (ref 3.6–5.1)
ALBUMIN/GLOB SERPL: 1.8 (CALC) (ref 1–2.5)
ALP SERPL-CCNC: 57 U/L (ref 40–115)
ALT SERPL-CCNC: 27 U/L (ref 9–46)
AST SERPL-CCNC: 19 U/L (ref 10–35)
BILIRUB SERPL-MCNC: 0.6 MG/DL (ref 0.2–1.2)
BUN SERPL-MCNC: 22 MG/DL (ref 7–25)
BUN/CREAT SERPL: ABNORMAL (CALC) (ref 6–22)
CALCIUM SERPL-MCNC: 9 MG/DL (ref 8.6–10.3)
CHLORIDE SERPL-SCNC: 103 MMOL/L (ref 98–110)
CHOLEST SERPL-MCNC: 146 MG/DL
CHOLEST/HDLC SERPL: 4.6 (CALC)
CO2 SERPL-SCNC: 29 MMOL/L (ref 20–31)
CREAT SERPL-MCNC: 1.14 MG/DL (ref 0.7–1.33)
GLOBULIN SER CALC-MCNC: 2.3 G/DL (CALC) (ref 1.9–3.7)
GLUCOSE SERPL-MCNC: 108 MG/DL (ref 65–99)
HBA1C MFR BLD: 5.8 % OF TOTAL HGB
HDLC SERPL-MCNC: 32 MG/DL
LDLC SERPL CALC-MCNC: 90 MG/DL (CALC)
NONHDLC SERPL-MCNC: 114 MG/DL (CALC)
POTASSIUM SERPL-SCNC: 3.8 MMOL/L (ref 3.5–5.3)
PROT SERPL-MCNC: 6.5 G/DL (ref 6.1–8.1)
PSA SERPL-MCNC: 1.4 NG/ML
SL AMB EGFR AFRICAN AMERICAN: 85 ML/MIN/1.73M2
SL AMB EGFR NON AFRICAN AMERICAN: 74 ML/MIN/1.73M2
SODIUM SERPL-SCNC: 139 MMOL/L (ref 135–146)
TRIGL SERPL-MCNC: 142 MG/DL
TSH SERPL-ACNC: 1.63 MIU/L (ref 0.4–4.5)

## 2018-07-26 PROCEDURE — 99214 OFFICE O/P EST MOD 30 MIN: CPT | Performed by: INTERNAL MEDICINE

## 2018-07-26 PROCEDURE — 99396 PREV VISIT EST AGE 40-64: CPT | Performed by: INTERNAL MEDICINE

## 2018-07-26 NOTE — PROGRESS NOTES
Assessment/Plan:           Problem List Items Addressed This Visit        Other    Wellness examination     Assessment and plan 1  Health maintenance annual wellness examination overall the patient is clinically stable and doing well, we encouraged the patient to follow a healthy and balanced diet  We recommend that the patient exercise routinely approximately 30 minutes 5 times per week   We have reviewed the patient's vaccines and have made recommendations for updates if necessary flu vaccine in the fall  We will be ordering screening laboratories which are age appropriate  Return to the office in 1 month as scheduled call if any problems  Patient also has symptoms of anxiety and depression no suicidal ideation he does not want counseling he is currently in the process of weaning off his current antidepressant and then will be starting effexpr in the near future  Other Visit Diagnoses     Diabetes 1 5, managed as type 2 ([facility] )    -  Primary    Relevant Orders    Comprehensive metabolic panel    Hemoglobin A1C    Lipid Panel with Direct LDL reflex    TSH, 3rd generation    Bilateral carpal tunnel syndrome        Relevant Orders    EMG 2 Limb Upper Extremity            Subjective:      Patient ID: Arnoldo Olivier is a 47 y o  male      HPI no si  AWV Clinical     ISAR:       Once in a Lifetime Medicare Screening:       Medicare Screening Tests and Risk Assessment:   AAA Risk Assessment    Osteoporosis Risk Assessment    HIV Risk Assessment        Drug and Alcohol Use:   Tobacco use    Cigarettes:  never smoker    Tobacco use duration    Tobacco Cessation Readiness    Alcohol use    Alcohol use:  rare use    Amount of alcohol consumed:  1 month   Concern about alcohol use:  No Tolerance to alcohol:  No   Attempts to cut down:  No Guilt about use:  No   Patient concern:  No Annoyed by criticism:  No   Morning drinking:  No Family concern:  No   Alcohol Treatment Readiness   Readiness to quit:  declined Illicit Drug Use    Drug use:  never        Diet & Exercise:   Diet   What is your diet?:  Regular   How many servings a day of the following:   Fruits and Vegetables:  1-2 Meat:  3-4   Whole Grains:  1 Simple Carbs:  2   Dairy:  1 Soda:  0, 1   Coffee:  0 Tea:  0   Exercise    Do you currently exercise?:  currently not exercising       Cognitive Impairment Screening:   Cognitive Impairment Screening        Functional Ability/Level of Safety:   Hearing    Hearing Impairment Assessment    Current Activities    Help needed with the folllowing:    ADL    Fall Risk   Have you fallen in the last 12 months?:  No    Injury History       Home Safety:   Home Safety Risk Factors       Advanced Directives:   Advanced Directives    Living Will:  No    Patient's End of Life Decisions        Urinary Incontinence:       Glaucoma: The following portions of the patient's history were reviewed and updated as appropriate: allergies, current medications, past family history, past medical history, past social history, past surgical history and problem list     Review of Systems      Objective:                    No Follow-up on file  Allergies   Allergen Reactions    Pollen Extract        Past Medical History:   Diagnosis Date    [facility] mellitus ([facility] )     Disease of thyroid gland     hypothyroid graves disease treated with radiation    Hyperlipidemia     Hypertension     Hyperthyroidism     L A  6/8/14    R    9/10/14      Irregular heart beat     Obesity     Sleep apnea     uses C-Pap machine    Venous insufficiency     L A 10/14/16   R    1/20/17       Past Surgical History:   Procedure Laterality Date    CHOLECYSTECTOMY      GALLBLADDER SURGERY      Anastomosis    [place] COLONOSCOPY FLX DX W/COLLJ SPEC WHEN PFRMD N/A 7/19/2017    Procedure: COLONOSCOPY;  Surgeon: Sonido Hicks MD;  [facility];   Service: Gastroenterology    UMBILICAL HERNIA REPAIR      VASCULAR SURGERY      VASECTOMY vas deferens      Current Outpatient Prescriptions on File Prior to Visit   Medication Sig Dispense Refill    gabapentin (NEURONTIN) 100 mg capsule 1-2 CAPSULES BY MOUTH AT BEDTIME 180 capsule 3    glucose blood (FREESTYLE LITE) test strip by In Vitro route daily      Lancets (FREESTYLE) lancets by Does not apply route daily      levothyroxine 125 mcg tablet TAKE 1 TABLET DAILY  90 tablet 3    lisinopril (ZESTRIL) 10 mg tablet Take 1 tablet (10 mg total) by mouth daily 30 tablet 4    metFORMIN (GLUCOPHAGE) 1000 MG tablet Take 1 tablet (1,000 mg total) by mouth 2 (two) times a day with meals 180 tablet 1    rizatriptan (MAXALT) 10 MG tablet Take 10 mg by mouth once as needed for migraine May repeat in 2 hours if needed      rosuvastatin (CRESTOR) 5 mg tablet Take 1 tablet (5 mg total) by mouth daily 90 tablet 1    venlafaxine (EFFEXOR-XR) 37 5 mg 24 hr capsule Take 1 capsule (37 5 mg total) by mouth daily 30 capsule 3    [DISCONTINUED] Na Sulfate-K Sulfate-Mg Sulf (SUPREP BOWEL PREP KIT) 17 5-3 13-1 6 GM/180ML SOLN Take by mouth       No current facility-administered medications on file prior to visit        Family History   Problem Relation Age of Onset    Lung cancer Mother     Prostate cancer Family      Social History     Social History    Marital status: /Civil Union     Spouse name: N/A    Number of children: N/A    Years of education: N/A     Occupational History          job physical requirements heavy lifting     Social History Main Topics    Smoking status: Never Smoker    Smokeless tobacco: Never Used    Alcohol use Yes      Comment: rare / denied social drinker per allscript    Drug use: No    Sexual activity: Not on file     Other Topics Concern    Not on file     Social History Narrative    Caffeine use    Work related stress     Vitals:    07/26/18 1626   BP: 128/80   Pulse: 73   Temp: 97 7 °F (36 5 °C)   SpO2: 95%   Weight: 126 kg (278 lb)   Height: 6' 2" (1 88 m) Results for orders placed or performed in visit on 07/25/18   Lipid Panel with Direct LDL reflex   Result Value Ref Range    Total Cholesterol 146 <200 mg/dL    HDL 32 (L) >40 mg/dL    Triglycerides 142 <150 mg/dL    LDL Direct 90 mg/dL (calc)    Chol HDLC Ratio 4 6 <5 0 (calc)    Non-HDL Cholesterol 114 <130 mg/dL (calc)   Comprehensive metabolic panel   Result Value Ref Range    SL AMB GLUCOSE 108 (H) 65 - 99 mg/dL    BUN 22 7 - 25 mg/dL    Creatinine, Serum 1 14 0 70 - 1 33 mg/dL    eGFR Non  74 > OR = 60 mL/min/1 73m2    SL AMB EGFR  85 > OR = 60 mL/min/1 73m2    SL AMB BUN/CREATININE RATIO NOT APPLICABLE 6 - 22 (calc)    SL AMB SODIUM 139 135 - 146 mmol/L    SL AMB POTASSIUM 3 8 3 5 - 5 3 mmol/L    SL AMB CHLORIDE 103 98 - 110 mmol/L    SL AMB CARBON DIOXIDE 29 20 - 31 mmol/L    SL AMB CALCIUM 9 0 8 6 - 10 3 mg/dL    SL AMB PROTEIN, TOTAL 6 5 6 1 - 8 1 g/dL    Serum Albumin 4 2 3 6 - 5 1 g/dL    SL AMB GLOBULIN 2 3 1 9 - 3 7 g/dL (calc)    SL AMB ALBUMIN/GLOBULIN RATIO 1 8 1 0 - 2 5 (calc)    SL AMB BILIRUBIN, TOTAL 0 6 0 2 - 1 2 mg/dL    SL AMB ALKALINE PHOSPHATASE 57 40 - 115 U/L    SL AMB AST 19 10 - 35 U/L    SL AMB ALT 27 9 - 46 U/L   TSH, 3rd generation   Result Value Ref Range    TSH 1 63 0 40 - 4 50 mIU/L   PSA, Total Screen   Result Value Ref Range    Prostate Specific Antigen Total 1 4 < OR = 4 0 ng/mL   Hemoglobin A1c (w/out EAG)   Result Value Ref Range    Hemoglobin A1C 5 8 (H) <5 7 % of total Hgb     Weight (last 2 days)     Date/Time   Weight    07/26/18 1626  126 (278)            Body mass index is 35 69 kg/m²    BP      Temp      Pulse     Resp      SpO2        Vitals:    07/26/18 1626   Weight: 126 kg (278 lb)     Vitals:    07/26/18 1626   Weight: 126 kg (278 lb)       /80   Pulse 73   Temp 97 7 °F (36 5 °C)   Ht 6' 2" (1 88 m)   Wt 126 kg (278 lb)   SpO2 95%   BMI 35 69 kg/m²          Physical Exam   Constitutional: He appears well-developed and well-nourished  No distress  HENT:   Head: Normocephalic and atraumatic  Right Ear: External ear normal    Left Ear: External ear normal    Mouth/Throat: Oropharynx is clear and moist    Eyes: Conjunctivae are normal  Pupils are equal, round, and reactive to light  Right eye exhibits no discharge  Left eye exhibits no discharge  No scleral icterus  Neck: Neck supple  Cardiovascular: Normal rate, regular rhythm and normal heart sounds  Exam reveals no gallop and no friction rub  No murmur heard  Pulmonary/Chest: No respiratory distress  He has no wheezes  He has no rales  Abdominal: Soft  Bowel sounds are normal  He exhibits no distension and no mass  There is no tenderness  There is no rebound and no guarding  Musculoskeletal: He exhibits no edema or deformity  Lymphadenopathy:     He has no cervical adenopathy  Neurological: He is alert  Skin: He is not diaphoretic  Psychiatric: He has a normal mood and affect

## 2018-07-27 NOTE — ASSESSMENT & PLAN NOTE
Assessment and plan 1  Health maintenance annual wellness examination overall the patient is clinically stable and doing well, we encouraged the patient to follow a healthy and balanced diet  We recommend that the patient exercise routinely approximately 30 minutes 5 times per week   We have reviewed the patient's vaccines and have made recommendations for updates if necessary flu vaccine in the fall  We will be ordering screening laboratories which are age appropriate  Return to the office in 1 month as scheduled call if any problems  Patient also has symptoms of anxiety and depression no suicidal ideation he does not want counseling he is currently in the process of weaning off his current antidepressant and then will be starting effexpr in the near future

## 2018-08-20 DIAGNOSIS — I10 ESSENTIAL HYPERTENSION: Primary | ICD-10-CM

## 2018-08-20 RX ORDER — LISINOPRIL 20 MG/1
TABLET ORAL
Qty: 90 TABLET | Refills: 3 | Status: SHIPPED | OUTPATIENT
Start: 2018-08-20 | End: 2018-09-26 | Stop reason: ALTCHOICE

## 2018-08-21 DIAGNOSIS — IMO0002 UNCONTROLLED TYPE 2 DIABETES MELLITUS WITH COMPLICATION, WITHOUT LONG-TERM CURRENT USE OF INSULIN: ICD-10-CM

## 2018-08-29 ENCOUNTER — OFFICE VISIT (OUTPATIENT)
Dept: INTERNAL MEDICINE CLINIC | Facility: CLINIC | Age: 53
End: 2018-08-29
Payer: COMMERCIAL

## 2018-08-29 VITALS
OXYGEN SATURATION: 98 % | SYSTOLIC BLOOD PRESSURE: 132 MMHG | RESPIRATION RATE: 16 BRPM | WEIGHT: 271.2 LBS | DIASTOLIC BLOOD PRESSURE: 84 MMHG | HEART RATE: 70 BPM | BODY MASS INDEX: 34.8 KG/M2 | HEIGHT: 74 IN

## 2018-08-29 DIAGNOSIS — B35.3 TINEA PEDIS, UNSPECIFIED LATERALITY: ICD-10-CM

## 2018-08-29 DIAGNOSIS — G47.33 OSA (OBSTRUCTIVE SLEEP APNEA): ICD-10-CM

## 2018-08-29 DIAGNOSIS — I10 ESSENTIAL HYPERTENSION: ICD-10-CM

## 2018-08-29 DIAGNOSIS — Z01.01 VISION SCREEN WITH ABNORMAL FINDINGS: Primary | ICD-10-CM

## 2018-08-29 DIAGNOSIS — G25.81 RESTLESS LEG SYNDROME: ICD-10-CM

## 2018-08-29 DIAGNOSIS — F41.9 ANXIETY: ICD-10-CM

## 2018-08-29 DIAGNOSIS — E13.9 DIABETES 1.5, MANAGED AS TYPE 2 (HCC): ICD-10-CM

## 2018-08-29 DIAGNOSIS — IMO0001 UNCONTROLLED TYPE 2 DIABETES MELLITUS WITHOUT COMPLICATION, WITHOUT LONG-TERM CURRENT USE OF INSULIN: ICD-10-CM

## 2018-08-29 DIAGNOSIS — K42.9 UMBILICAL HERNIA WITHOUT OBSTRUCTION AND WITHOUT GANGRENE: ICD-10-CM

## 2018-08-29 DIAGNOSIS — M62.08 RECTUS DIASTASIS: ICD-10-CM

## 2018-08-29 PROBLEM — G47.30 SLEEP APNEA: Status: ACTIVE | Noted: 2018-08-29

## 2018-08-29 PROCEDURE — 99214 OFFICE O/P EST MOD 30 MIN: CPT | Performed by: INTERNAL MEDICINE

## 2018-08-29 RX ORDER — GABAPENTIN 100 MG/1
CAPSULE ORAL
Qty: 270 CAPSULE | Refills: 0 | Status: SHIPPED | OUTPATIENT
Start: 2018-08-29 | End: 2019-01-16 | Stop reason: SDUPTHER

## 2018-08-29 RX ORDER — DULOXETIN HYDROCHLORIDE 20 MG/1
20 CAPSULE, DELAYED RELEASE ORAL DAILY
Qty: 30 CAPSULE | Refills: 3 | Status: SHIPPED | OUTPATIENT
Start: 2018-08-29 | End: 2018-09-26 | Stop reason: SDUPTHER

## 2018-08-29 NOTE — PROGRESS NOTES
Assessment/Plan:           Problem List Items Addressed This Visit        Endocrine    Diabetes mellitus type 2, uncontrolled ([facility] )     Lab Results   Component Value Date    HGBA1C 5 8 (H) 07/25/2018       No results for input(s): POCGLU in the last 72 hours  Blood Sugar Average: Last 72 hrs:   currently stable doing well, counseled  Complete the diabetic foot examination today patient does have mild tinea pedis for which I will prescribed econazole cream 1% apply to affected area once a day times 10 days  Diabetic foot care advised  Diabetes 1 5, managed as type 2 ([facility] )    Relevant Orders    Ambulatory referral to Ophthalmology    Microalbumin,Urine       Cardiovascular and Mediastinum    Hypertension     Hypertension - controlled, I have counseled patient following healthy balance diet, I would like the patient reduce sodium, exercise routinely, I would like the patient continued the med current medical regiment and we will continue to monitor  Musculoskeletal and Integument    Rectus diastasis     Currently asymptomatic he will be seen General surgery for the umbilical hernia I would like him also to mention this to them although I do not feel there is any treatment needed at this point time  Tinea pedis    Relevant Medications    econazole nitrate 1 % cream       Other    Anxiety     Patient does report me with affects res developed lethargy and headache daily at this point time I will discontinue the Effexor and start him on Cymbalta 20 mg once daily no suicidal ideation he refuses counseling today I did  the patient  I reviewed the risks benefits and side effects of Cymbalta  Relevant Medications    DULoxetine (CYMBALTA) 20 mg capsule    Umbilical hernia    Relevant Orders    Ambulatory referral to General Surgery    Restless leg syndrome     Currently stable I refilled the patient's gabapentin he tolerates it very well without side effect           Relevant Medications    gabapentin (NEURONTIN) 100 mg capsule      Other Visit Diagnoses     Vision screen with abnormal findings    -  Primary    JOSH (obstructive sleep apnea)        Relevant Orders    PAP DME Resupply/Reorder          Return to office  4-6 weeks months  call if any problems  Subjective:      Patient ID: Elder Killian is a 47 y o  male  HPI 54-year old male coming in for a follow up visit regarding restless leg syndrome, type 2 diabetes, tinea pedis, umbilical hernia recurrence; anxiety, obstructive sleep apnea and hypertension; The patient reports me compliant taking medications   The patient is here to review his medical condition, update me on the medical condition and the patient reports me no hospitalizations and no ER visits  He reports feels weird and lethargic and ha (crown of the head and he is taking ecedrin) and right after taking the effexor,  Takes in the am,  He feels a little lethergic less anxiety no si no hi  Sleep well doing projects , closeness not there with his wife , "it is what it is"  Family trip coming [place] to [address] , no trust in his wife per the pt  He requests a refill of gabapentin which is very helpful for his restless leg syndrome he tolerates medication  He also reports me a bulging of his umbilicus 2 years ago he did have an umbilical hernia repair; he does not report any recent injury that may have accounted for this                    The following portions of the patient's history were reviewed and updated as appropriate: allergies, current medications, past family history, past medical history, past social history, past surgical history and problem list     Review of Systems   Constitutional: Positive for fatigue  Negative for activity change, appetite change and unexpected weight change  HENT: Negative for congestion and postnasal drip  Eyes: Negative for visual disturbance  Respiratory: Negative for cough and shortness of breath   Chest tightness: secondary to Effexor     Cardiovascular: Negative for chest pain  Gastrointestinal: Negative for abdominal pain, diarrhea, nausea and vomiting  Recurrent umbilical hernia, tenderness with pressing the umbilical hernia also weakness bulging of the mid upper abdominal region   Neurological: Positive for headaches ( secondary to effexor)  Negative for dizziness and light-headedness  Hematological: Negative for adenopathy  Psychiatric/Behavioral: Negative for suicidal ideas  The patient is nervous/anxious  Objective:                  No Follow-up on file  Allergies   Allergen Reactions    Pollen Extract        Past Medical History:   Diagnosis Date    [facility] mellitus ([facility] )     Disease of thyroid gland     hypothyroid graves disease treated with radiation    Hyperlipidemia     Hypertension     Hyperthyroidism     L A  6/8/14    R    9/10/14      Irregular heart beat     Obesity     Sleep apnea     uses C-Pap machine    Venous insufficiency     L A 10/14/16   R    1/20/17       Past Surgical History:   Procedure Laterality Date    CHOLECYSTECTOMY      GALLBLADDER SURGERY      Anastomosis    [place] COLONOSCOPY FLX DX W/COLLJ SPEC WHEN PFRMD N/A 7/19/2017    Procedure: COLONOSCOPY;  Surgeon: Carmen Riggins MD;  [facility]; Service: Gastroenterology    UMBILICAL HERNIA REPAIR      VASCULAR SURGERY      VASECTOMY      vas deferens      Current Outpatient Prescriptions on File Prior to Visit   Medication Sig Dispense Refill    glucose blood (FREESTYLE LITE) test strip by In Vitro route daily      Lancets (FREESTYLE) lancets by Does not apply route daily      levothyroxine 125 mcg tablet TAKE 1 TABLET DAILY   90 tablet 3    lisinopril (ZESTRIL) 10 mg tablet Take 1 tablet (10 mg total) by mouth daily 30 tablet 4    lisinopril (ZESTRIL) 20 mg tablet TAKE 1 TABLET DAILY 90 tablet 3    metFORMIN (GLUCOPHAGE) 1000 MG tablet TAKE 1 TABLET (1,000 MG TOTAL) BY MOUTH 2 (TWO) TIMES A DAY WITH MEALS 180 tablet 1    rizatriptan (MAXALT) 10 MG tablet Take 10 mg by mouth once as needed for migraine May repeat in 2 hours if needed      rosuvastatin (CRESTOR) 5 mg tablet Take 1 tablet (5 mg total) by mouth daily 90 tablet 1    [DISCONTINUED] gabapentin (NEURONTIN) 100 mg capsule 1-2 CAPSULES BY MOUTH AT BEDTIME 180 capsule 3    [DISCONTINUED] venlafaxine (EFFEXOR-XR) 37 5 mg 24 hr capsule Take 1 capsule (37 5 mg total) by mouth daily 30 capsule 3     No current facility-administered medications on file prior to visit        Family History   Problem Relation Age of Onset    Lung cancer Mother     Prostate cancer Family      Social History     Social History    Marital status: /Civil Union     Spouse name: N/A    Number of children: N/A    Years of education: N/A     Occupational History          job physical requirements heavy lifting     Social History Main Topics    Smoking status: Never Smoker    Smokeless tobacco: Never Used    Alcohol use Yes      Comment: rare / denied social drinker per allscript    Drug use: No    Sexual activity: Not on file     Other Topics Concern    Not on file     Social History Narrative    Caffeine use    Work related stress     Vitals:    08/29/18 1737   BP: 132/84   BP Location: Right arm   Patient Position: Sitting   Cuff Size: Standard   Pulse: 70   Resp: 16   SpO2: 98%   Weight: 123 kg (271 lb 3 2 oz)   Height: 6' 2" (1 88 m)     Results for orders placed or performed in visit on 07/25/18   Lipid Panel with Direct LDL reflex   Result Value Ref Range    Total Cholesterol 146 <200 mg/dL    HDL 32 (L) >40 mg/dL    Triglycerides 142 <150 mg/dL    LDL Direct 90 mg/dL (calc)    Chol HDLC Ratio 4 6 <5 0 (calc)    Non-HDL Cholesterol 114 <130 mg/dL (calc)   Comprehensive metabolic panel   Result Value Ref Range    Glucose 108 (H) 65 - 99 mg/dL    BUN 22 7 - 25 mg/dL    Creatinine 1 14 0 70 - 1 33 mg/dL    eGFR Non  74 > OR = 60 mL/min/1 73m2    SL AMB EGFR  85 > OR = 60 mL/min/1 73m2    SL AMB BUN/CREATININE RATIO NOT APPLICABLE 6 - 22 (calc)    Sodium 139 135 - 146 mmol/L    SL AMB POTASSIUM 3 8 3 5 - 5 3 mmol/L    Chloride 103 98 - 110 mmol/L    SL AMB CARBON DIOXIDE 29 20 - 31 mmol/L    SL AMB CALCIUM 9 0 8 6 - 10 3 mg/dL    SL AMB PROTEIN, TOTAL 6 5 6 1 - 8 1 g/dL    Albumin 4 2 3 6 - 5 1 g/dL    Globulin 2 3 1 9 - 3 7 g/dL (calc)    SL AMB ALBUMIN/GLOBULIN RATIO 1 8 1 0 - 2 5 (calc)    TOTAL BILIRUBIN 0 6 0 2 - 1 2 mg/dL    Alkaline Phosphatase 57 40 - 115 U/L    SL AMB AST 19 10 - 35 U/L    SL AMB ALT 27 9 - 46 U/L   TSH, 3rd generation   Result Value Ref Range    TSH 1 63 0 40 - 4 50 mIU/L   PSA, Total Screen   Result Value Ref Range    Prostate Specific Antigen Total 1 4 < OR = 4 0 ng/mL   Hemoglobin A1c (w/out EAG)   Result Value Ref Range    Hemoglobin A1C 5 8 (H) <5 7 % of total Hgb     Weight (last 2 days)     Date/Time   Weight    08/29/18 1737  123 (271 2)            Body mass index is 34 82 kg/m²  BP      Temp      Pulse     Resp      SpO2        Vitals:    08/29/18 1737   Weight: 123 kg (271 lb 3 2 oz)     Vitals:    08/29/18 1737   Weight: 123 kg (271 lb 3 2 oz)         /84 (BP Location: Right arm, Patient Position: Sitting, Cuff Size: Standard)   Pulse 70   Resp 16   Ht 6' 2" (1 88 m)   Wt 123 kg (271 lb 3 2 oz)   SpO2 98%   BMI 34 82 kg/m²          Physical Exam   Constitutional: He appears well-developed and well-nourished  No distress  HENT:   Head: Normocephalic and atraumatic  Right Ear: External ear normal    Left Ear: External ear normal    Mouth/Throat: Oropharynx is clear and moist    Eyes: Conjunctivae are normal  Pupils are equal, round, and reactive to light  Right eye exhibits no discharge  Left eye exhibits no discharge  No scleral icterus  Neck: Neck supple  Cardiovascular: Normal rate, regular rhythm and normal heart sounds    Exam reveals no gallop and no friction rub   Pulses are no weak pulses  No murmur heard  Pulses:       Dorsalis pedis pulses are 2+ on the right side, and 2+ on the left side  Posterior tibial pulses are 2+ on the right side, and 2+ on the left side  Pulmonary/Chest: No respiratory distress  He has no wheezes  He has no rales  Abdominal: Soft  Bowel sounds are normal  He exhibits no distension and no mass  There is no tenderness  There is no rebound and no guarding  Musculoskeletal: He exhibits no edema or deformity  Feet:   Right Foot:   Skin Integrity: Negative for ulcer, skin breakdown, erythema, warmth, callus or dry skin  Left Foot:   Skin Integrity: Negative for ulcer, skin breakdown, erythema, warmth, callus or dry skin  Lymphadenopathy:     He has no cervical adenopathy  Neurological: He is alert  Skin: He is not diaphoretic  Psychiatric: His mood appears anxious  He does not exhibit a depressed mood  He expresses no homicidal and no suicidal ideation  Patient's shoes and socks removed  Right Foot/Ankle   Right Foot Inspection  Skin Exam: skin normal and skin intact no dry skin, no warmth, no callus, no erythema, no maceration, no abnormal color, no pre-ulcer, no ulcer and no callus                          Toe Exam: ROM and strength within normal limits  Sensory       Monofilament testing: intact  Vascular  Capillary refills: < 3 seconds  The right DP pulse is 2+  The right PT pulse is 2+  Left Foot/Ankle  Left Foot Inspection  Skin Exam: skin normal and skin intactno dry skin, no warmth, no erythema, no maceration, normal color, no pre-ulcer, no ulcer and no callus                         Toe Exam: ROM and strength within normal limits                   Sensory       Monofilament: intact  Vascular  Capillary refills: < 3 seconds  The left DP pulse is 2+  The left PT pulse is 2+  Assign Risk Category:  No deformity present; Loss of protective sensation;  No weak pulses       Risk: 0   Interdigital tinea pedis bilateral

## 2018-08-30 NOTE — ASSESSMENT & PLAN NOTE
Lab Results   Component Value Date    HGBA1C 5 8 (H) 07/25/2018       No results for input(s): POCGLU in the last 72 hours  Blood Sugar Average: Last 72 hrs:   currently stable doing well, counseled  Complete the diabetic foot examination today patient does have mild tinea pedis for which I will prescribed econazole cream 1% apply to affected area once a day times 10 days  Diabetic foot care advised

## 2018-08-30 NOTE — ASSESSMENT & PLAN NOTE
Patient does report me with affects res developed lethargy and headache daily at this point time I will discontinue the Effexor and start him on Cymbalta 20 mg once daily no suicidal ideation he refuses counseling today I did  the patient  I reviewed the risks benefits and side effects of Cymbalta

## 2018-08-30 NOTE — ASSESSMENT & PLAN NOTE
Currently asymptomatic he will be seen General surgery for the umbilical hernia I would like him also to mention this to them although I do not feel there is any treatment needed at this point time

## 2018-09-20 DIAGNOSIS — F41.9 ANXIETY: ICD-10-CM

## 2018-09-20 DIAGNOSIS — E13.9 DIABETES 1.5, MANAGED AS TYPE 2 (HCC): ICD-10-CM

## 2018-09-20 DIAGNOSIS — I10 ESSENTIAL HYPERTENSION: ICD-10-CM

## 2018-09-20 RX ORDER — VENLAFAXINE HYDROCHLORIDE 37.5 MG/1
CAPSULE, EXTENDED RELEASE ORAL
Qty: 30 CAPSULE | Refills: 1 | Status: SHIPPED | OUTPATIENT
Start: 2018-09-20 | End: 2018-09-26 | Stop reason: ALTCHOICE

## 2018-09-20 RX ORDER — LISINOPRIL 10 MG/1
TABLET ORAL
Qty: 30 TABLET | Refills: 1 | Status: SHIPPED | OUTPATIENT
Start: 2018-09-20 | End: 2018-09-26 | Stop reason: SDUPTHER

## 2018-09-26 ENCOUNTER — OFFICE VISIT (OUTPATIENT)
Dept: INTERNAL MEDICINE CLINIC | Facility: CLINIC | Age: 53
End: 2018-09-26
Payer: COMMERCIAL

## 2018-09-26 VITALS
OXYGEN SATURATION: 96 % | WEIGHT: 280.4 LBS | HEIGHT: 74 IN | SYSTOLIC BLOOD PRESSURE: 130 MMHG | RESPIRATION RATE: 16 BRPM | DIASTOLIC BLOOD PRESSURE: 88 MMHG | HEART RATE: 71 BPM | BODY MASS INDEX: 35.99 KG/M2

## 2018-09-26 DIAGNOSIS — Z23 NEED FOR INFLUENZA VACCINATION: Primary | ICD-10-CM

## 2018-09-26 DIAGNOSIS — E13.9 DIABETES 1.5, MANAGED AS TYPE 2 (HCC): ICD-10-CM

## 2018-09-26 DIAGNOSIS — E78.5 HYPERLIPIDEMIA, UNSPECIFIED HYPERLIPIDEMIA TYPE: ICD-10-CM

## 2018-09-26 DIAGNOSIS — F41.9 ANXIETY: ICD-10-CM

## 2018-09-26 DIAGNOSIS — E66.01 CLASS 2 SEVERE OBESITY DUE TO EXCESS CALORIES WITH SERIOUS COMORBIDITY AND BODY MASS INDEX (BMI) OF 35.0 TO 35.9 IN ADULT (HCC): ICD-10-CM

## 2018-09-26 DIAGNOSIS — I10 ESSENTIAL HYPERTENSION: ICD-10-CM

## 2018-09-26 DIAGNOSIS — E03.9 HYPOTHYROIDISM, UNSPECIFIED TYPE: ICD-10-CM

## 2018-09-26 PROBLEM — G47.62 NOCTURNAL LEG CRAMPS: Status: ACTIVE | Noted: 2018-09-26

## 2018-09-26 PROCEDURE — 4010F ACE/ARB THERAPY RXD/TAKEN: CPT | Performed by: INTERNAL MEDICINE

## 2018-09-26 PROCEDURE — 1036F TOBACCO NON-USER: CPT | Performed by: INTERNAL MEDICINE

## 2018-09-26 PROCEDURE — 3075F SYST BP GE 130 - 139MM HG: CPT | Performed by: INTERNAL MEDICINE

## 2018-09-26 PROCEDURE — 3079F DIAST BP 80-89 MM HG: CPT | Performed by: INTERNAL MEDICINE

## 2018-09-26 PROCEDURE — 99214 OFFICE O/P EST MOD 30 MIN: CPT | Performed by: INTERNAL MEDICINE

## 2018-09-26 RX ORDER — DULOXETIN HYDROCHLORIDE 20 MG/1
20 CAPSULE, DELAYED RELEASE ORAL DAILY
Qty: 90 CAPSULE | Refills: 0 | Status: SHIPPED | OUTPATIENT
Start: 2018-09-26 | End: 2019-01-08 | Stop reason: SDUPTHER

## 2018-09-26 RX ORDER — LISINOPRIL 10 MG/1
10 TABLET ORAL DAILY
Qty: 90 TABLET | Refills: 0 | Status: SHIPPED | OUTPATIENT
Start: 2018-09-26 | End: 2019-01-08 | Stop reason: SDUPTHER

## 2018-09-26 RX ORDER — ROSUVASTATIN CALCIUM 5 MG/1
5 TABLET, COATED ORAL DAILY
Qty: 90 TABLET | Refills: 0 | Status: SHIPPED | OUTPATIENT
Start: 2018-09-26 | End: 2019-01-16 | Stop reason: SDUPTHER

## 2018-09-26 RX ORDER — LEVOTHYROXINE SODIUM 0.12 MG/1
125 TABLET ORAL DAILY
Qty: 90 TABLET | Refills: 0 | Status: SHIPPED | OUTPATIENT
Start: 2018-09-26 | End: 2019-01-16 | Stop reason: SDUPTHER

## 2018-09-30 NOTE — ASSESSMENT & PLAN NOTE
Lab Results   Component Value Date    HGBA1C 5 8 (H) 07/25/2018       No results for input(s): POCGLU in the last 72 hours  Blood Sugar Average: Last 72 hrs:   I have counselled the pt to follow a healthy and balanced diet ,and recommend routine exercise  I have counseled patient on the importance of taking his medications routinely he does have laboratory set up to monitoring of his diabetes

## 2018-09-30 NOTE — ASSESSMENT & PLAN NOTE
Anxiety and depression no suicidal ideation he has not started Cymbalta, I have counseled the importance about starting this medication and he plans to start in the near future  Declines suicidal ideation, I have counseled the patient

## 2018-10-04 ENCOUNTER — CLINICAL SUPPORT (OUTPATIENT)
Dept: INTERNAL MEDICINE CLINIC | Facility: CLINIC | Age: 53
End: 2018-10-04
Payer: COMMERCIAL

## 2018-10-04 DIAGNOSIS — Z23 NEED FOR INFLUENZA VACCINATION: Primary | ICD-10-CM

## 2018-10-04 PROCEDURE — 90471 IMMUNIZATION ADMIN: CPT

## 2018-10-04 PROCEDURE — 90682 RIV4 VACC RECOMBINANT DNA IM: CPT

## 2019-01-08 DIAGNOSIS — IMO0002 UNCONTROLLED TYPE 2 DIABETES MELLITUS WITH COMPLICATION, WITHOUT LONG-TERM CURRENT USE OF INSULIN: ICD-10-CM

## 2019-01-08 DIAGNOSIS — I10 ESSENTIAL HYPERTENSION: ICD-10-CM

## 2019-01-08 DIAGNOSIS — F41.9 ANXIETY: ICD-10-CM

## 2019-01-08 RX ORDER — LISINOPRIL 10 MG/1
10 TABLET ORAL DAILY
Qty: 90 TABLET | Refills: 0 | Status: SHIPPED | OUTPATIENT
Start: 2019-01-08 | End: 2019-01-16 | Stop reason: SDUPTHER

## 2019-01-08 RX ORDER — DULOXETIN HYDROCHLORIDE 20 MG/1
20 CAPSULE, DELAYED RELEASE ORAL DAILY
Qty: 90 CAPSULE | Refills: 0 | Status: SHIPPED | OUTPATIENT
Start: 2019-01-08 | End: 2019-01-16 | Stop reason: SDUPTHER

## 2019-01-16 ENCOUNTER — OFFICE VISIT (OUTPATIENT)
Dept: INTERNAL MEDICINE CLINIC | Facility: CLINIC | Age: 54
End: 2019-01-16
Payer: COMMERCIAL

## 2019-01-16 VITALS
HEIGHT: 74 IN | SYSTOLIC BLOOD PRESSURE: 134 MMHG | WEIGHT: 285.6 LBS | DIASTOLIC BLOOD PRESSURE: 80 MMHG | HEART RATE: 71 BPM | RESPIRATION RATE: 16 BRPM | OXYGEN SATURATION: 98 % | BODY MASS INDEX: 36.65 KG/M2

## 2019-01-16 DIAGNOSIS — E78.5 HYPERLIPIDEMIA, UNSPECIFIED HYPERLIPIDEMIA TYPE: ICD-10-CM

## 2019-01-16 DIAGNOSIS — Z23 NEED FOR VACCINE FOR TD (TETANUS-DIPHTHERIA): Primary | ICD-10-CM

## 2019-01-16 DIAGNOSIS — I10 ESSENTIAL HYPERTENSION: ICD-10-CM

## 2019-01-16 DIAGNOSIS — E03.9 HYPOTHYROIDISM, UNSPECIFIED TYPE: ICD-10-CM

## 2019-01-16 DIAGNOSIS — F44.9 DISSOCIATION: ICD-10-CM

## 2019-01-16 DIAGNOSIS — IMO0002 UNCONTROLLED TYPE 2 DIABETES MELLITUS WITH COMPLICATION, WITHOUT LONG-TERM CURRENT USE OF INSULIN: ICD-10-CM

## 2019-01-16 DIAGNOSIS — F41.9 ANXIETY: ICD-10-CM

## 2019-01-16 DIAGNOSIS — G25.81 RESTLESS LEG SYNDROME: ICD-10-CM

## 2019-01-16 PROCEDURE — 3075F SYST BP GE 130 - 139MM HG: CPT | Performed by: INTERNAL MEDICINE

## 2019-01-16 PROCEDURE — 90471 IMMUNIZATION ADMIN: CPT

## 2019-01-16 PROCEDURE — 99214 OFFICE O/P EST MOD 30 MIN: CPT | Performed by: INTERNAL MEDICINE

## 2019-01-16 PROCEDURE — 3079F DIAST BP 80-89 MM HG: CPT | Performed by: INTERNAL MEDICINE

## 2019-01-16 PROCEDURE — 90714 TD VACC NO PRESV 7 YRS+ IM: CPT

## 2019-01-16 RX ORDER — LISINOPRIL 10 MG/1
10 TABLET ORAL DAILY
Qty: 90 TABLET | Refills: 1 | Status: SHIPPED | OUTPATIENT
Start: 2019-01-16 | End: 2019-02-20 | Stop reason: SDUPTHER

## 2019-01-16 RX ORDER — GABAPENTIN 300 MG/1
CAPSULE ORAL
Qty: 90 CAPSULE | Refills: 1 | Status: SHIPPED | OUTPATIENT
Start: 2019-01-16 | End: 2019-02-20 | Stop reason: SDUPTHER

## 2019-01-16 RX ORDER — LEVOTHYROXINE SODIUM 0.12 MG/1
125 TABLET ORAL DAILY
Qty: 90 TABLET | Refills: 1 | Status: SHIPPED | OUTPATIENT
Start: 2019-01-16 | End: 2020-02-12 | Stop reason: ALTCHOICE

## 2019-01-16 RX ORDER — DULOXETIN HYDROCHLORIDE 30 MG/1
30 CAPSULE, DELAYED RELEASE ORAL DAILY
Qty: 90 CAPSULE | Refills: 1 | Status: SHIPPED | OUTPATIENT
Start: 2019-01-16 | End: 2019-06-27 | Stop reason: SDUPTHER

## 2019-01-16 RX ORDER — HYDROCODONE BITARTRATE AND ACETAMINOPHEN 7.5; 325 MG/1; MG/1
1 TABLET ORAL
Refills: 0 | COMMUNITY
Start: 2019-01-14 | End: 2020-02-12 | Stop reason: ALTCHOICE

## 2019-01-16 RX ORDER — ROSUVASTATIN CALCIUM 5 MG/1
5 TABLET, COATED ORAL DAILY
Qty: 90 TABLET | Refills: 1 | Status: SHIPPED | OUTPATIENT
Start: 2019-01-16 | End: 2020-03-24

## 2019-01-16 NOTE — PROGRESS NOTES
Assessment/Plan:           Problem List Items Addressed This Visit        Endocrine    Uncontrolled type 2 diabetes mellitus with complication, without long-term current use of insulin ([facility] [address] )     Lab Results   Component Value Date    HGBA1C 5 8 (H) 07/25/2018       No results for input(s): POCGLU in the last 72 hours  Blood Sugar Average: Last 72 hrs:   I have counselled the pt to follow a healthy and balanced diet ,and recommend routine exercise  I will be ordering diabetic laboratories including comprehensive metabolic panel, hemoglobin A1c, urine microalbumin, lipid panel  Relevant Medications    metFORMIN (GLUCOPHAGE) 1000 MG tablet    Other Relevant Orders    Comprehensive metabolic panel    Hemoglobin A1C    Lipid Panel with Direct LDL reflex    Microalbumin / creatinine urine ratio    Ambulatory referral to Optometry    Hypothyroidism     Hypothyroidism controlled the patient is currently euthyroid I will be ordering a TSH prior to the next office visit and the patient will continue with current medical regiment; we will continue to monitor the patient's progress  Relevant Medications    levothyroxine 125 mcg tablet    Other Relevant Orders    TSH, 3rd generation       Cardiovascular and Mediastinum    Hypertension     Hypertension - controlled, I have counseled patient following healthy balance diet, I would like the patient reduce sodium, exercise routinely, I would like the patient continued the med current medical regiment and we will continue to monitor           Relevant Medications    lisinopril (ZESTRIL) 10 mg tablet       Other    Anxiety     No suicidal ideation he declines seeing counselor Ev Garcia today will increase Cymbalta 30 mg once daily and I will have the patient see [facility] Psychiatry I will have Ev Garcia help the patient get an appointment ASAP, he reports me is not concentrating as well secondary to anxiety and depression I did discuss this with Ev Garcia who did not recommend stop driving but I will have him see Psychiatry for their opinion  Will check EEG, 's fit testing and neurology consultation         Relevant Medications    DULoxetine (CYMBALTA) 30 mg delayed release capsule    Other Relevant Orders    Ambulatory referral to Psychiatry    EEG Routine and awake    Ambulatory referral to ot  adaptability evaluation    Hyperlipidemia    Relevant Medications    rosuvastatin (CRESTOR) 5 mg tablet    Restless leg syndrome    Relevant Medications    gabapentin (NEURONTIN) 300 mg capsule    Dissociation    Relevant Medications    DULoxetine (CYMBALTA) 30 mg delayed release capsule    Other Relevant Orders    EEG Routine and awake    Ambulatory referral to ot  adaptability evaluation    Ambulatory referral to Neurology      Other Visit Diagnoses     Need for vaccine for Td (tetanus-diphtheria)    -  Primary    Relevant Orders    TD VACCINE GREATER THAN OR EQUAL TO 8YO PRESERVATIVE FREE IM (Completed)          Return to office 1  months  call if any problems  Subjective:      Patient ID: Joycelyn Sterling is a 47 y o  male  HPI 54-year old male coming in for a follow up visit regarding anxiety, restless leg syndrome, hypothyroidism, type 2 diabetes, hyperlipidemia, he reports me increased anxiety/depression, no suicidal ideation the pt reports dosent care about stuff,  Going to work, doing good at work,  [employer] sleeping and sleeps as much as possible and freaks out at work  Because of the day to day operation, not able to relax at work, panic attack , disoriented " like it not happing" reports me just had a root canal and because the mask in chin strap there is pain he needs to take Vicodin at nighttime which is helping him to sleep although 1 tablet anxiety severe , depression , indecisiveness no si no hi    No LOC some times when driving home from work he does not know how he got home; no accidents, no safety problems he reports me he is thinking about a lot of things no seizure activity    The following portions of the patient's history were reviewed and updated as appropriate: allergies, current medications, past family history, past medical history, past social history, past surgical history and problem list     Review of Systems   Constitutional: Negative for activity change, appetite change and unexpected weight change  HENT: Negative for congestion and postnasal drip  Eyes: Negative for visual disturbance  Respiratory: Negative for cough and shortness of breath  Cardiovascular: Negative for chest pain  Gastrointestinal: Negative for abdominal pain, diarrhea, nausea and vomiting  Neurological: Negative for dizziness, light-headedness and headaches  Hematological: Negative for adenopathy  Psychiatric/Behavioral: Negative for suicidal ideas  The patient is nervous/anxious  ASHER-7 Flowsheet Screening      Most Recent Value   Over the last two weeks, how often have you been bothered by the following problems? Feeling nervous, anxious, or on edge  3   Not being able to stop or control worrying  2   Worrying too much about different things  3   Trouble relaxing   0   Being so restless that it's hard to sit still  2   Becoming easily annoyed or irritable   3   Feeling afraid as if something awful might happen  1   How difficult have these problems made it for you to do your work, take care of things at home, or get along with other people? Extremely difficult   ASHER Score   14        Objective:  ph  No Follow-up on file  No results found    Recent Results (from the past [id number] hour(s))   Stress test only, exercise    Narrative    [facility] [address] [place] [address]  [phone]    Stress Electrocardiography during Exercise    Name: Donald Chacon  MR #: [id number]  Account #: [de-identified]  Study date: 11/10/2016  : 1965  Age: 49 years  Gender: Male  Height: 74 in  Weight: 300 lb  BSA: 2 58 m squared    Allergies: ALLERGIES NOT ON FILE    Diagnosis: R94 31 - Abnormal electrocardiogram [ECG] [EKG]    RN:  Navdeep Ponce RN  Primary Physician:  Meagan Buckner DO  Referring Physician:  Wayne Quiñones MD  Group:  [facility]  Cardiology Fellow:  Analisa Sparks MD  Report Prepared By[de-identified]  Jemma Cruz  Technician:  Jemma Cruz  Interpreting Physician:  Melody Pagan MD    CLINICAL QUESTION: Detection of coronary artery disease  HISTORY: The patient is a 49year old  male  Chest pain status: chest  pain  Other symptoms: dyspnea,Obstructive sleep apnea,abnormal Ekg,  hyperthyroidism,headaches  Coronary artery disease risk factors: dyslipidemia  and hypertension  Co-morbidity: obesity  REST ECG: Normal sinus rhythm at 64 BPM  Normal baseline ECG  PROCEDURE: Treadmill exercise testing was performed, using the Edy protocol  Stress electrocardiographic evaluation was performed  EDY PROTOCOL:  HR bpm SBP mmHg DBP mmHg Symptoms  Baseline 66 120 80 none  Stage 1 99 124 78 --  Stage 2 126 180 100 mild dyspnea  Stage 3 153 -- -- --  Immediate 155 198 80 moderate dyspnea, fatigue  Recovery 1 105 184 70 subsiding  Recovery 2 88 138 80 none  Pre 02 Sat 98% and Peak 02 Sat 97%  No medications or fluids given  STRESS SUMMARY: Duration of exercise was 8 min and 20 sec  The patient  exercised to protocol stage 3  Maximal work rate was 10 4 METs  Functional  capacity was normal  Maximal heart rate during stress was 155 bpm ( 91 % of  maximal predicted heart rate)  The heart rate response to stress was normal   There was normal resting blood pressure with an appropriate response to stress  The rate-pressure product for the peak heart rate and blood pressure was 95720  There was no chest pain during stress  The stress test was terminated due to  moderate dyspnea and moderate fatigue  The stress ECG was negative for  ischemia   Arrhythmia during stress: isolated atrial premature beats  SUMMARY:  -  Stress results: Duration of exercise was 8 min and 20 sec  Target heart rate  was achieved  There was no chest pain during stress  -  ECG conclusions: The stress ECG was negative for ischemia  IMPRESSIONS: Normal study after maximal exercise without reproduction of  symptoms  Prepared and signed by    Alphonso Connor MD  Signed 11/10/2016 11:14:09     Echo complete with contrast if indicated    Narrative    [address]  [facility], [address] [facility]  [phone]    Transthoracic Echocardiogram  2D, M-mode, Doppler, and Color Doppler    Study date:  10-Nov-2016    Patient: Abelino Morrison  MR number: [id number]  Account number: [de-identified]  : 1965  Age: 49 years  Gender: Male  Status: Outpatient  Location: [address] [place] [address] [facility] [place]  Height: 74 in  Weight: 300 lb  BP: 140/ 92 mmHg    Indications: Chest pain    Diagnoses: R07 9 - Chest pain, unspecified    Sonographer:  [place]  Primary Physician:  Tara Anthony DO  Referring Physician:  Tabitha Quintero MD  Group:  [facility] [address] [facility]  Interpreting Physician:  Alphonso Connor MD    SUMMARY    LEFT VENTRICLE:  Systolic function was normal  Ejection fraction was estimated to be 55 %  There were no regional wall motion abnormalities  The changes were consistent with concentric remodeling (increased wall  thickness with normal wall mass)  HISTORY: PRIOR HISTORY: Hypertension, high cholesterol, tachycardia, sleep  apnea    PROCEDURE: The study was performed in the [address] [facility] [place]  This was a routine study  The transthoracic approach was used  The study  included complete 2D imaging, M-mode, complete spectral Doppler, and color  Doppler  Image quality was adequate  LEFT VENTRICLE: Size was normal  Systolic function was normal  Ejection  fraction was estimated to be 55 %  There were no regional wall motion  abnormalities   Wall thickness was mildly increased  The changes were consistent  with concentric remodeling (increased wall thickness with normal wall mass)  DOPPLER: The ratio of early ventricular filling to atrial contraction  velocities was within the normal range  Left ventricular diastolic function  parameters were normal     RIGHT VENTRICLE: The size was normal  Systolic function was normal     LEFT ATRIUM: Size was within the limits of normal when indexed for body surface  area  RIGHT ATRIUM: Size was normal     MITRAL VALVE: There was mild annular calcification  Valve structure was normal   There was normal leaflet separation  DOPPLER: The transmitral velocity was  within the normal range  There was no evidence for stenosis  There was trace  regurgitation  AORTIC VALVE: The valve was trileaflet  Leaflets exhibited mildly increased  thickness and normal cuspal separation  DOPPLER: There was no evidence for  stenosis  There was no regurgitation  TRICUSPID VALVE: The valve structure was normal  There was normal leaflet  separation  DOPPLER: There was no evidence for stenosis  There was trace  regurgitation  Pulmonary artery systolic pressure was within the normal range  Estimated peak PA pressure was 25 mmHg  PULMONIC VALVE: Leaflets exhibited normal thickness, no calcification, and  normal cuspal separation  DOPPLER: The transpulmonic velocity was within the  normal range  There was mild regurgitation  PERICARDIUM: There was no pericardial effusion  A pericardial fat pad was  present  AORTA: The root exhibited normal size  SYSTEMIC VEINS: IVC: The inferior vena cava was normal in size and course    Respirophasic changes were normal     SYSTEM MEASUREMENT TABLES    2D  %FS: 37 21 %  Ao Diam: 3 69 cm  EDV(Teich): 111 33 ml  EF(Cube): 75 24 %  EF(Teich): 67 02 %  ESV(Cube): 28 63 ml  ESV(Teich): 36 71 ml  IVSd: 1 36 cm  LA Area: 22 29 cm2  LA Diam: 3 88 cm  LVEDV MOD A4C: 149 64 ml  LVEF MOD A4C: 55 69 %  LVESV MOD A4C: 66 31 ml  LVIDd: 4 87 cm  LVIDs: 3 06 cm  LVLd A4C: 9 cm  LVLs A4C: 7 45 cm  LVPWd: 1 29 cm  RA Area: 19 71 cm2  RV Diam : 4 1 cm  SV MOD A4C: 83 33 ml  SV(Cube): 87 03 ml  SV(Teich): 74 62 ml    CW  TR Vmax: 2 45 m/s  TR maxP 92 mmHg    MM  TAPSE: 1 67 cm    PW  E': 0 07 m/s  E/E': 11 03  MV A Kevon: 0 64 m/s  MV Dec George: 3 79 m/s2  MV DecT: 217 88 ms  MV E Kevon: 0 83 m/s  MV E/A Ratio: 1 29    [employer] Accredited Echocardiography Laboratory    Prepared and electronically signed by    Suzanne Lin MD  Signed [id number] 09:58:30         Allergies   Allergen Reactions    Pollen Extract        Past Medical History:   Diagnosis Date    Diabetes mellitus ([facility] [address] )     Disease of thyroid gland     hypothyroid graves disease treated with radiation    Hyperlipidemia     Hypertension     Hyperthyroidism     L A  14    R    9/10/14      Irregular heart beat     Obesity     Sleep apnea     uses C-Pap machine    Venous insufficiency     L A 10/14/16   R    17       Past Surgical History:   Procedure Laterality Date    CHOLECYSTECTOMY      GALLBLADDER SURGERY      Anastomosis    VA COLONOSCOPY FLX DX W/COLLJ SPEC WHEN PFRMD N/A 2017    Procedure: COLONOSCOPY;  Surgeon: Denys Zhu MD;  [facility]; Service: Gastroenterology    UMBILICAL HERNIA REPAIR      VASCULAR SURGERY      VASECTOMY      vas deferens      Current Outpatient Prescriptions on File Prior to Visit   Medication Sig Dispense Refill    econazole nitrate 1 % cream Apply topically daily 15 g 0    glucose blood (FREESTYLE LITE) test strip by In Vitro route daily      Lancets (FREESTYLE) lancets by Does not apply route daily      rizatriptan (MAXALT) 10 MG tablet Take 10 mg by mouth once as needed for migraine May repeat in 2 hours if needed       No current facility-administered medications on file prior to visit        Family History   Problem Relation Age of Onset    Lung cancer Mother    Josias Abt Prostate cancer Family      Social History     Social History    Marital status: /Civil Union     Spouse name: N/A    Number of children: N/A    Years of education: N/A     Occupational History          job physical requirements heavy lifting     Social History Main Topics    Smoking status: Never Smoker    Smokeless tobacco: Never Used    Alcohol use Yes      Comment: rare / denied social drinker per [employer]    Drug use: No    Sexual activity: Not on file     Other Topics Concern    Not on file     Social History Narrative    Caffeine use    Work related stress     Vitals:    01/16/19 1422   BP: 134/80   BP Location: Left arm   Patient Position: Sitting   Cuff Size: Large   Pulse: 71   Resp: 16   SpO2: 98%   Weight: 130 kg (285 lb 9 6 oz)   Height: 6' 2" (1 88 m)     Results for orders placed or performed in visit on 07/25/18   Lipid Panel with Direct LDL reflex   Result Value Ref Range    Total Cholesterol 146 <200 mg/dL    HDL 32 (L) >40 mg/dL    Triglycerides 142 <150 mg/dL    LDL Direct 90 mg/dL (calc)    Chol HDLC Ratio 4 6 <5 0 (calc)    Non-HDL Cholesterol 114 <130 mg/dL (calc)   Comprehensive metabolic panel   Result Value Ref Range    Glucose, Random 108 (H) 65 - 99 mg/dL    BUN 22 7 - 25 mg/dL    Creatinine 1 14 0 70 - 1 33 mg/dL    eGFR Non  74 > OR = 60 mL/min/1 73m2    SL AMB EGFR  85 > OR = 60 mL/min/1 73m2    SL AMB BUN/CREATININE RATIO NOT APPLICABLE 6 - 22 (calc)    Sodium 139 135 - 146 mmol/L    Potassium 3 8 3 5 - 5 3 mmol/L    Chloride 103 98 - 110 mmol/L    CO2 29 20 - 31 mmol/L    SL AMB CALCIUM 9 0 8 6 - 10 3 mg/dL    SL AMB PROTEIN, TOTAL 6 5 6 1 - 8 1 g/dL    Albumin 4 2 3 6 - 5 1 g/dL    Globulin 2 3 1 9 - 3 7 g/dL (calc)    Albumin/Globulin Ratio 1 8 1 0 - 2 5 (calc)    TOTAL BILIRUBIN 0 6 0 2 - 1 2 mg/dL    Alkaline Phosphatase 57 40 - 115 U/L    SL AMB AST 19 10 - 35 U/L    SL AMB ALT 27 9 - 46 U/L   TSH, 3rd generation   Result Value Ref Range    TSH 1 63 0 40 - 4 50 mIU/L   PSA, Total Screen   Result Value Ref Range    Prostate Specific Antigen Total 1 4 < OR = 4 0 ng/mL   Hemoglobin A1c (w/out EAG)   Result Value Ref Range    Hemoglobin A1C 5 8 (H) <5 7 % of total Hgb     Weight (last 2 days)     None        Body mass index is 36 67 kg/m²  BP      Temp      Pulse     Resp      SpO2        Vitals:    01/16/19 1422   Weight: 130 kg (285 lb 9 6 oz)     Vitals:    01/16/19 1422   Weight: 130 kg (285 lb 9 6 oz)       /80 (BP Location: Left arm, Patient Position: Sitting, Cuff Size: Large)   Pulse 71   Resp 16   Ht 6' 2" (1 88 m)   Wt 130 kg (285 lb 9 6 oz)   SpO2 98%   BMI 36 67 kg/m²          Physical Exam   Constitutional: He appears well-developed and well-nourished  No distress  HENT:   Head: Normocephalic and atraumatic  Right Ear: External ear normal    Left Ear: External ear normal    Mouth/Throat: Oropharynx is clear and moist    Eyes: Pupils are equal, round, and reactive to light  Conjunctivae are normal  Right eye exhibits no discharge  Left eye exhibits no discharge  No scleral icterus  Neck: Neck supple  Cardiovascular: Normal rate, regular rhythm and normal heart sounds  Exam reveals no gallop and no friction rub  No murmur heard  Pulmonary/Chest: No respiratory distress  He has no wheezes  He has no rales  Abdominal: Soft  Bowel sounds are normal  He exhibits no distension and no mass  There is no tenderness  There is no rebound and no guarding  Musculoskeletal: He exhibits no edema or deformity  Lymphadenopathy:     He has no cervical adenopathy  Neurological: He is alert  Skin: He is not diaphoretic  Psychiatric: His mood appears anxious  He exhibits a depressed mood  He expresses no homicidal and no suicidal ideation

## 2019-01-18 ENCOUNTER — TELEPHONE (OUTPATIENT)
Dept: PSYCHIATRY | Facility: CLINIC | Age: 54
End: 2019-01-18

## 2019-01-18 NOTE — TELEPHONE ENCOUNTER
----- Message from Laura Toledo III, DO sent at 1/18/2019  9:24 AM EST -----  Regarding: RE: New referral  Blaise Galarza, schedule with me     ----- Message -----  From: Tarun Bella MA  Sent: 1/17/2019   7:43 AM  To: [facility], , Fidelia Kirk MA  Subject: New referral                                     Would you be willing to see this pt?  ----- Message -----  From: Iris Collado  Sent: 1/16/2019   4:50 PM  To: [address] [place] [address], , #    PCP was requesting he see Dr Soni Charlton specifically but obviously any provider would be good   Jame Burton  ----- Message -----  From: Cristina Perea DO  Sent: 1/16/2019   3:27 PM  To: Iris Collado    Please get the pt in with psych as we discuss

## 2019-01-20 PROBLEM — F44.9 DISSOCIATION: Status: ACTIVE | Noted: 2019-01-20

## 2019-01-20 PROBLEM — E03.9 HYPOTHYROIDISM: Status: ACTIVE | Noted: 2019-01-20

## 2019-01-20 NOTE — ASSESSMENT & PLAN NOTE
Lab Results   Component Value Date    HGBA1C 5 8 (H) 07/25/2018       No results for input(s): POCGLU in the last 72 hours  Blood Sugar Average: Last 72 hrs:   I have counselled the pt to follow a healthy and balanced diet ,and recommend routine exercise  I will be ordering diabetic laboratories including comprehensive metabolic panel, hemoglobin A1c, urine microalbumin, lipid panel

## 2019-01-20 NOTE — ASSESSMENT & PLAN NOTE
No suicidal ideation he declines seeing counselor Jackie Silveira today will increase Cymbalta 30 mg once daily and I will have the patient see [facility] Psychiatry I will have Jackie Silveira help the patient get an appointment ASAP, he reports me is not concentrating as well secondary to anxiety and depression I did discuss this with Jackie Silveira who did not recommend stop driving but I will have him see Psychiatry for their opinion    Will check EEG, 's fit testing and neurology consultation

## 2019-01-29 ENCOUNTER — HOSPITAL ENCOUNTER (OUTPATIENT)
Dept: NEUROLOGY | Facility: AMBULATORY SURGERY CENTER | Age: 54
Discharge: HOME | End: 2019-01-29
Payer: COMMERCIAL

## 2019-01-29 DIAGNOSIS — F41.9 ANXIETY: ICD-10-CM

## 2019-01-29 DIAGNOSIS — F44.9 DISSOCIATION: ICD-10-CM

## 2019-01-29 PROCEDURE — 95816 EEG AWAKE AND DROWSY: CPT | Performed by: PSYCHIATRY & NEUROLOGY

## 2019-01-29 PROCEDURE — 95816 EEG AWAKE AND DROWSY: CPT

## 2019-01-30 LAB
LEFT EYE DIABETIC RETINOPATHY: NORMAL
RIGHT EYE DIABETIC RETINOPATHY: NORMAL

## 2019-01-30 PROCEDURE — 3072F LOW RISK FOR RETINOPATHY: CPT | Performed by: INTERNAL MEDICINE

## 2019-02-19 LAB
ALBUMIN SERPL-MCNC: 4.7 G/DL (ref 3.6–5.1)
ALBUMIN/GLOB SERPL: 2.2 (CALC) (ref 1–2.5)
ALP SERPL-CCNC: 67 U/L (ref 40–115)
ALT SERPL-CCNC: 40 U/L (ref 9–46)
AST SERPL-CCNC: 24 U/L (ref 10–35)
BILIRUB SERPL-MCNC: 0.7 MG/DL (ref 0.2–1.2)
BUN SERPL-MCNC: 19 MG/DL (ref 7–25)
BUN/CREAT SERPL: ABNORMAL (CALC) (ref 6–22)
CALCIUM SERPL-MCNC: 9.4 MG/DL (ref 8.6–10.3)
CHLORIDE SERPL-SCNC: 102 MMOL/L (ref 98–110)
CHOLEST SERPL-MCNC: 140 MG/DL
CHOLEST/HDLC SERPL: 4.7 (CALC)
CO2 SERPL-SCNC: 32 MMOL/L (ref 20–32)
CREAT SERPL-MCNC: 1.16 MG/DL (ref 0.7–1.33)
GLOBULIN SER CALC-MCNC: 2.1 G/DL (CALC) (ref 1.9–3.7)
GLUCOSE SERPL-MCNC: 123 MG/DL (ref 65–99)
HBA1C MFR BLD: 6.4 % OF TOTAL HGB
HDLC SERPL-MCNC: 30 MG/DL
LDLC SERPL CALC-MCNC: 89 MG/DL (CALC)
NONHDLC SERPL-MCNC: 110 MG/DL (CALC)
POTASSIUM SERPL-SCNC: 3.9 MMOL/L (ref 3.5–5.3)
PROT SERPL-MCNC: 6.8 G/DL (ref 6.1–8.1)
SL AMB EGFR AFRICAN AMERICAN: 83 ML/MIN/1.73M2
SL AMB EGFR NON AFRICAN AMERICAN: 72 ML/MIN/1.73M2
SODIUM SERPL-SCNC: 141 MMOL/L (ref 135–146)
TRIGL SERPL-MCNC: 115 MG/DL
TSH SERPL-ACNC: 4.7 MIU/L (ref 0.4–4.5)

## 2019-02-20 ENCOUNTER — OFFICE VISIT (OUTPATIENT)
Dept: INTERNAL MEDICINE CLINIC | Facility: CLINIC | Age: 54
End: 2019-02-20
Payer: COMMERCIAL

## 2019-02-20 VITALS
OXYGEN SATURATION: 98 % | SYSTOLIC BLOOD PRESSURE: 142 MMHG | WEIGHT: 280 LBS | HEIGHT: 74 IN | TEMPERATURE: 97.7 F | DIASTOLIC BLOOD PRESSURE: 98 MMHG | HEART RATE: 82 BPM | BODY MASS INDEX: 35.94 KG/M2

## 2019-02-20 DIAGNOSIS — E66.01 CLASS 2 SEVERE OBESITY DUE TO EXCESS CALORIES WITH SERIOUS COMORBIDITY AND BODY MASS INDEX (BMI) OF 35.0 TO 35.9 IN ADULT (HCC): ICD-10-CM

## 2019-02-20 DIAGNOSIS — G25.81 RESTLESS LEG SYNDROME: ICD-10-CM

## 2019-02-20 DIAGNOSIS — F41.9 ANXIETY: ICD-10-CM

## 2019-02-20 DIAGNOSIS — E11.9 TYPE 2 DIABETES MELLITUS WITHOUT COMPLICATION, WITHOUT LONG-TERM CURRENT USE OF INSULIN (HCC): ICD-10-CM

## 2019-02-20 DIAGNOSIS — I10 ESSENTIAL HYPERTENSION: ICD-10-CM

## 2019-02-20 DIAGNOSIS — F44.9 DISSOCIATION: ICD-10-CM

## 2019-02-20 DIAGNOSIS — E78.5 HYPERLIPIDEMIA, UNSPECIFIED HYPERLIPIDEMIA TYPE: ICD-10-CM

## 2019-02-20 DIAGNOSIS — E03.9 HYPOTHYROIDISM, UNSPECIFIED TYPE: Primary | ICD-10-CM

## 2019-02-20 PROCEDURE — 1036F TOBACCO NON-USER: CPT | Performed by: INTERNAL MEDICINE

## 2019-02-20 PROCEDURE — 4010F ACE/ARB THERAPY RXD/TAKEN: CPT | Performed by: INTERNAL MEDICINE

## 2019-02-20 PROCEDURE — 99214 OFFICE O/P EST MOD 30 MIN: CPT | Performed by: INTERNAL MEDICINE

## 2019-02-20 PROCEDURE — 3008F BODY MASS INDEX DOCD: CPT | Performed by: INTERNAL MEDICINE

## 2019-02-20 RX ORDER — GABAPENTIN 300 MG/1
CAPSULE ORAL
Qty: 90 CAPSULE | Refills: 1 | Status: SHIPPED | OUTPATIENT
Start: 2019-02-20 | End: 2019-08-22 | Stop reason: SDUPTHER

## 2019-02-20 RX ORDER — LEVOTHYROXINE SODIUM 137 UG/1
137 TABLET ORAL DAILY
Qty: 90 TABLET | Refills: 1 | Status: SHIPPED | OUTPATIENT
Start: 2019-02-20 | End: 2019-08-22 | Stop reason: SDUPTHER

## 2019-02-20 RX ORDER — LISINOPRIL 20 MG/1
20 TABLET ORAL DAILY
Qty: 90 TABLET | Refills: 2 | Status: SHIPPED | OUTPATIENT
Start: 2019-02-20 | End: 2019-11-19 | Stop reason: SDUPTHER

## 2019-02-20 NOTE — ASSESSMENT & PLAN NOTE
Mild elevation will increase the lisinopril to 20 mg once a day, the patient start monitoring his blood pressure at work and will keep a log for me call if any dizziness

## 2019-02-20 NOTE — ASSESSMENT & PLAN NOTE
Lab Results   Component Value Date    HGBA1C 6 4 (H) 02/18/2019       No results for input(s): POCGLU in the last 72 hours  Blood Sugar Average: Last 72 hrs:   review the laboratories currently controlled he has eliminated soda he will start work on improving his diet reducing carbohydrates and sweets I have advised him start exercising routinely/walking  I will be ordering diabetic laboratories including comprehensive metabolic panel, hemoglobin A1c, urine microalbumin, lipid panel

## 2019-02-20 NOTE — ASSESSMENT & PLAN NOTE
Improved continue with current dose of Cymbalta he is tolerating it very well he wants to hold off on count he reports a that he is now hunting drink a lot better he is able to maintain focus without a problem she reports me his driving abilities are doing fine now he went for the EED that is normal   He wants to hold off on the 's test/neurology consultation because of improvement of the symptoms completely  He reports me he will contact me if any of the symptoms returned  Likely his symptoms were related to anxiety  No suicidal ideation

## 2019-02-20 NOTE — ASSESSMENT & PLAN NOTE
Resolved likely secondary to anxiety disorder improved with Cymbalta EEG was negative no further symptoms he is not having any problems he reports me is driving is completely normal without any problem he declines going for a further testing because of the improvement in the symptoms he states that he will notify me immediately if the symptoms were to return

## 2019-02-20 NOTE — ASSESSMENT & PLAN NOTE
Mild elevation of the TSH will increase the levothyroxine to 137 mcg once a day recheck TSH 4 weeks

## 2019-02-20 NOTE — PROGRESS NOTES
Assessment/Plan:    Hypothyroidism  Mild elevation of the TSH will increase the levothyroxine to 137 mcg once a day recheck TSH 4 weeks  Type 2 diabetes mellitus without complication, without long-term current use of insulin ([facility])  Lab Results   Component Value Date    HGBA1C 6 4 (H) 02/18/2019       No results for input(s): POCGLU in the last 72 hours  Blood Sugar Average: Last 72 hrs:   review the laboratories currently controlled he has eliminated soda he will start work on improving his diet reducing carbohydrates and sweets I have advised him start exercising routinely/walking  I will be ordering diabetic laboratories including comprehensive metabolic panel, hemoglobin A1c, urine microalbumin, lipid panel  Hypertension  Mild elevation will increase the lisinopril to 20 mg once a day, the patient start monitoring his blood pressure at work and will keep a log for me call if any dizziness  Anxiety  Improved continue with current dose of Cymbalta he is tolerating it very well he wants to hold off on count he reports a that he is now hunting drink a lot better he is able to maintain focus without a problem she reports me his driving abilities are doing fine now he went for the EED that is normal   He wants to hold off on the 's test/neurology consultation because of improvement of the symptoms completely  He reports me he will contact me if any of the symptoms returned  Likely his symptoms were related to anxiety  No suicidal ideation  Dissociation  Resolved likely secondary to anxiety disorder improved with Cymbalta EEG was negative no further symptoms he is not having any problems he reports me is driving is completely normal without any problem he declines going for a further testing because of the improvement in the symptoms he states that he will notify me immediately if the symptoms were to return      Hyperlipidemia  Hyperlipidemia controlled continue with current medical regiment recommend a low-cholesterol diet and recommend routine exercise we will continue to monitor the progress  Obesity  Obesity -I have counseled patient following healthy and balanced diet, I would like the patient to lose weight, I would like the patient exercise routinely; we will continue monitor the patient's progress  Restless leg syndrome  Stable with current dose of Neurontin he requests a refill  Problem List Items Addressed This Visit        Endocrine    Type 2 diabetes mellitus without complication, without long-term current use of insulin ([facility] [address] )     Lab Results   Component Value Date    HGBA1C 6 4 (H) 02/18/2019       No results for input(s): POCGLU in the last 72 hours  Blood Sugar Average: Last 72 hrs:   review the laboratories currently controlled he has eliminated soda he will start work on improving his diet reducing carbohydrates and sweets I have advised him start exercising routinely/walking  I will be ordering diabetic laboratories including comprehensive metabolic panel, hemoglobin A1c, urine microalbumin, lipid panel  Relevant Orders    Hemoglobin A1C    Comprehensive metabolic panel    Lipid Panel with Direct LDL reflex    Microalbumin / creatinine urine ratio    Hypothyroidism - Primary     Mild elevation of the TSH will increase the levothyroxine to 137 mcg once a day recheck TSH 4 weeks  Relevant Medications    levothyroxine 137 mcg tablet    Other Relevant Orders    TSH, 3rd generation       Cardiovascular and Mediastinum    Hypertension     Mild elevation will increase the lisinopril to 20 mg once a day, the patient start monitoring his blood pressure at work and will keep a log for me call if any dizziness           Relevant Medications    lisinopril (ZESTRIL) 20 mg tablet       Other    Anxiety     Improved continue with current dose of Cymbalta he is tolerating it very well he wants to hold off on count he reports a that he is now hunting drink a lot better he is able to maintain focus without a problem she reports me his driving abilities are doing fine now he went for the EED that is normal   He wants to hold off on the 's test/neurology consultation because of improvement of the symptoms completely  He reports me he will contact me if any of the symptoms returned  Likely his symptoms were related to anxiety  No suicidal ideation  Hyperlipidemia     Hyperlipidemia controlled continue with current medical regiment recommend a low-cholesterol diet and recommend routine exercise we will continue to monitor the progress  Obesity     Obesity -I have counseled patient following healthy and balanced diet, I would like the patient to lose weight, I would like the patient exercise routinely; we will continue monitor the patient's progress  Restless leg syndrome     Stable with current dose of Neurontin he requests a refill  Relevant Medications    gabapentin (NEURONTIN) 300 mg capsule    Dissociation     Resolved likely secondary to anxiety disorder improved with Cymbalta EEG was negative no further symptoms he is not having any problems he reports me is driving is completely normal without any problem he declines going for a further testing because of the improvement in the symptoms he states that he will notify me immediately if the symptoms were to return  Return to office 6  months  call if any problems  Subjective:      Patient ID: Jayleen Guzman is a 47 y o  male  HPI 54-year old male coming in for a follow up visit regarding hypothyroidism, type 2 diabetes, essential hypertension, anxiety, hyperlipidemia, obesity, restless leg syndrome; The patient reports me compliant taking medications without untoward side effects the  The patient is here to review his medical condition, update me on the medical condition and the patient reports me no hospitalizations and no ER visits    He reports me significant improvements in his anxiety no depression no suicidal ideation reports me he is now focused not have any difficulty with driving he is not being about a lot of other things will have anymore he is able to maintain his focus since his anxiety has improved  Today he is here to review his laboratories  He has cut out soda he is motivated to make change in his diet he plans to start exercising at work routinely  He reports me that injure going a little bit better at home he is living in the home with his wife and children he reports me that they are doing their own things, he reports me that his wife would not agree to marriage counseling  The patient does not want counseling  No suicidal ideation he reports me he is happy with the way things are going now  He reports me things are going well at work also  not loosing gaps of time , labs and eye scan    The following portions of the patient's history were reviewed and updated as appropriate: allergies, current medications, past family history, past medical history, past social history, past surgical history and problem list     Review of Systems   Constitutional: Negative for activity change, appetite change and unexpected weight change  HENT: Negative for congestion and postnasal drip  Eyes: Negative for visual disturbance  Respiratory: Negative for cough and shortness of breath  Cardiovascular: Negative for chest pain  Gastrointestinal: Negative for abdominal pain, diarrhea, nausea and vomiting  Neurological: Negative for dizziness, light-headedness and headaches  Hematological: Negative for adenopathy  Psychiatric/Behavioral: Negative for confusion ( improved) and suicidal ideas  The patient is nervous/anxious (Improved)  Objective:    No follow-ups on file  No results found    Recent Results (from the past [id number] hour(s))   Stress test only, exercise    Narrative    [address]  [place] [address] [facility]  [phone]    Stress Electrocardiography during Exercise    Name: Lorena Leyva  MR #: [id number]  Account #: [de-identified]  Study date: 11/10/2016  : 1965  Age: 49 years  Gender: Male  Height: 74 in  Weight: 300 lb  BSA: 2 58 m squared    Allergies: ALLERGIES NOT ON FILE    Diagnosis: R94 31 - Abnormal electrocardiogram [ECG] [EKG]    RN:  Mk Khan RN  Primary Physician:  Ankita Quintero DO  Referring Physician:  Anthony Piedra MD  Group:  [facility]  Cardiology Fellow:  Felicia Osorio MD  Report Prepared By[de-identified]  Miah Gonzalez  Technician:  Miah Gonzalez  Interpreting Physician:  Tiffanie Eubanks MD    CLINICAL QUESTION: Detection of coronary artery disease  HISTORY: The patient is a 49year old  male  Chest pain status: chest  pain  Other symptoms: dyspnea,Obstructive sleep apnea,abnormal Ekg,  hyperthyroidism,headaches  Coronary artery disease risk factors: dyslipidemia  and hypertension  Co-morbidity: obesity  REST ECG: Normal sinus rhythm at 64 BPM  Normal baseline ECG  PROCEDURE: Treadmill exercise testing was performed, using the Edy protocol  Stress electrocardiographic evaluation was performed  EDY PROTOCOL:  HR bpm SBP mmHg DBP mmHg Symptoms  Baseline 66 120 80 none  Stage 1 99 124 78 --  Stage 2 126 180 100 mild dyspnea  Stage 3 153 -- -- --  Immediate 155 198 80 moderate dyspnea, fatigue  Recovery 1 105 184 70 subsiding  Recovery 2 88 138 80 none  Pre 02 Sat 98% and Peak 02 Sat 97%  No medications or fluids given  STRESS SUMMARY: Duration of exercise was 8 min and 20 sec  The patient  exercised to protocol stage 3  Maximal work rate was 10 4 METs  Functional  capacity was normal  Maximal heart rate during stress was 155 bpm ( 91 % of  maximal predicted heart rate)  The heart rate response to stress was normal   There was normal resting blood pressure with an appropriate response to stress    The rate-pressure product for the peak heart rate and blood pressure was 58819  There was no chest pain during stress  The stress test was terminated due to  moderate dyspnea and moderate fatigue  The stress ECG was negative for  ischemia  Arrhythmia during stress: isolated atrial premature beats  SUMMARY:  -  Stress results: Duration of exercise was 8 min and 20 sec  Target heart rate  was achieved  There was no chest pain during stress  -  ECG conclusions: The stress ECG was negative for ischemia  IMPRESSIONS: Normal study after maximal exercise without reproduction of  symptoms  Prepared and signed by    Donavon Boateng MD  Signed 11/10/2016 11:14:09     Echo complete with contrast if indicated    Narrative    [facility]  [facility] [place] [address] [facility]  [phone]    Transthoracic Echocardiogram  2D, M-mode, Doppler, and Color Doppler    Study date:  10-Nov-2016    Patient: Taya Lim  MR number: [id number]  Account number: [de-identified]  : 1965  Age: 49 years  Gender: Male  Status: Outpatient  Location: [address] [place] [address] and [facility]  Height: 74 in  Weight: 300 lb  BP: 140/ 92 mmHg    Indications: Chest pain    Diagnoses: R07 9 - Chest pain, unspecified    Sonographer:  Tavo May  Primary Physician:  Prakash Jones DO  Referring Physician:  Sb Lewis MD  Group:  [facility]  Interpreting Physician:  Donavon Boateng MD    SUMMARY    LEFT VENTRICLE:  Systolic function was normal  Ejection fraction was estimated to be 55 %  There were no regional wall motion abnormalities  The changes were consistent with concentric remodeling (increased wall  thickness with normal wall mass)  HISTORY: PRIOR HISTORY: Hypertension, high cholesterol, tachycardia, sleep  apnea    PROCEDURE: The study was performed in the [address] [facility]  This was a routine study  The transthoracic approach was used   The study  included complete 2D imaging, M-mode, complete spectral Doppler, and color  Doppler  Image quality was adequate  LEFT VENTRICLE: Size was normal  Systolic function was normal  Ejection  fraction was estimated to be 55 %  There were no regional wall motion  abnormalities  Wall thickness was mildly increased  The changes were consistent  with concentric remodeling (increased wall thickness with normal wall mass)  DOPPLER: The ratio of early ventricular filling to atrial contraction  velocities was within the normal range  Left ventricular diastolic function  parameters were normal     RIGHT VENTRICLE: The size was normal  Systolic function was normal     LEFT ATRIUM: Size was within the limits of normal when indexed for body surface  area  RIGHT ATRIUM: Size was normal     MITRAL VALVE: There was mild annular calcification  Valve structure was normal   There was normal leaflet separation  DOPPLER: The transmitral velocity was  within the normal range  There was no evidence for stenosis  There was trace  regurgitation  AORTIC VALVE: The valve was trileaflet  Leaflets exhibited mildly increased  thickness and normal cuspal separation  DOPPLER: There was no evidence for  stenosis  There was no regurgitation  TRICUSPID VALVE: The valve structure was normal  There was normal leaflet  separation  DOPPLER: There was no evidence for stenosis  There was trace  regurgitation  Pulmonary artery systolic pressure was within the normal range  Estimated peak PA pressure was 25 mmHg  PULMONIC VALVE: Leaflets exhibited normal thickness, no calcification, and  normal cuspal separation  DOPPLER: The transpulmonic velocity was within the  normal range  There was mild regurgitation  PERICARDIUM: There was no pericardial effusion  A pericardial fat pad was  present  AORTA: The root exhibited normal size  SYSTEMIC VEINS: IVC: The inferior vena cava was normal in size and course    Respirophasic changes were normal     SYSTEM MEASUREMENT TABLES    2D  %FS: 37 21 %  Ao Diam: 3 69 cm  EDV(Teich): 111 33 ml  EF(Cube): 75 24 %  EF(Teich): 67 02 %  ESV(Cube): 28 63 ml  ESV(Teich): 36 71 ml  IVSd: 1 36 cm  LA Area: 22 29 cm2  LA Diam: 3 88 cm  LVEDV MOD A4C: 149 64 ml  LVEF MOD A4C: 55 69 %  LVESV MOD A4C: 66 31 ml  LVIDd: 4 87 cm  LVIDs: 3 06 cm  LVLd A4C: 9 cm  LVLs A4C: 7 45 cm  LVPWd: 1 29 cm  RA Area: 19 71 cm2  RV Diam : 4 1 cm  SV MOD A4C: 83 33 ml  SV(Cube): 87 03 ml  SV(Teich): 74 62 ml    CW  TR Vmax: 2 45 m/s  TR maxP 92 mmHg    MM  TAPSE: 1 67 cm    PW  E': 0 07 m/s  E/E': 11 03  MV A Kevon: 0 64 m/s  MV Dec Rock: 3 79 m/s2  MV DecT: 217 88 ms  MV E Kevon: 0 83 m/s  MV E/A Ratio: 1 29    [facility] Accredited [facility]    Prepared and electronically signed by    Hallie King MD  Signed [id number] 09:58:30         Allergies   Allergen Reactions    Pollen Extract        Past Medical History:   Diagnosis Date    Diabetes mellitus ([facility] [address] )     Disease of thyroid gland     hypothyroid graves disease treated with radiation    Hyperlipidemia     Hypertension     Hyperthyroidism     L A  14    R    9/10/14      Irregular heart beat     Obesity     Sleep apnea     uses C-Pap machine    Venous insufficiency     L A 10/14/16   R    17       Past Surgical History:   Procedure Laterality Date    CHOLECYSTECTOMY      GALLBLADDER SURGERY      Anastomosis    RI COLONOSCOPY FLX DX W/COLLJ SPEC WHEN PFRMD N/A 2017    Procedure: COLONOSCOPY;  Surgeon: Liu Shanks MD;  [facility];   Service: Gastroenterology    UMBILICAL HERNIA REPAIR      VASCULAR SURGERY      VASECTOMY      vas deferens      Current Outpatient Medications on File Prior to Visit   Medication Sig Dispense Refill    DULoxetine (CYMBALTA) 30 mg delayed release capsule Take 1 capsule (30 mg total) by mouth daily 90 capsule 1    glucose blood (FREESTYLE LITE) test strip by In Vitro route daily      Lancets (FREESTYLE) lancets by Does not apply route daily      levothyroxine 125 mcg tablet Take 1 tablet (125 mcg total) by mouth daily 90 tablet 1    metFORMIN (GLUCOPHAGE) 1000 MG tablet Take 1 tablet (1,000 mg total) by mouth 2 (two) times a day with meals 180 tablet 1    rosuvastatin (CRESTOR) 5 mg tablet Take 1 tablet (5 mg total) by mouth daily 90 tablet 1    [DISCONTINUED] gabapentin (NEURONTIN) 300 mg capsule 3 tablets po Hs 90 capsule 1    [DISCONTINUED] lisinopril (ZESTRIL) 10 mg tablet Take 1 tablet (10 mg total) by mouth daily 90 tablet 1    econazole nitrate 1 % cream Apply topically daily (Patient not taking: Reported on 2/20/2019) 15 g 0    HYDROcodone-acetaminophen (NORCO) 7 5-325 mg per tablet Take 1 tablet by mouth  0    rizatriptan (MAXALT) 10 MG tablet Take 10 mg by mouth once as needed for migraine May repeat in 2 hours if needed       No current facility-administered medications on file prior to visit        Family History   Problem Relation Age of Onset    Lung cancer Mother     Prostate cancer Family      Social History     Socioeconomic History    Marital status: /Civil Union     Spouse name: Not on file    Number of children: Not on file    Years of education: Not on file    Highest education level: Not on file   Occupational History     Comment: job physical requirements heavy lifting   Social Needs    Financial resource strain: Not on file    Food insecurity:     Worry: Not on file     Inability: Not on file    Transportation needs:     Medical: Not on file     Non-medical: Not on file   Tobacco Use    Smoking status: Never Smoker    Smokeless tobacco: Never Used   Substance and Sexual Activity    Alcohol use: Yes     Comment: rare / denied social drinker per allscript    Drug use: No    Sexual activity: Not on file   Lifestyle    Physical activity:     Days per week: Not on file     Minutes per session: Not on file    Stress: Not on file   Relationships    Social connections:     Talks on phone: Not on file     Gets together: Not on file     Attends Congregational service: Not on file     Active member of club or organization: Not on file     Attends meetings of clubs or organizations: Not on file     Relationship status: Not on file    Intimate partner violence:     Fear of current or ex partner: Not on file     Emotionally abused: Not on file     Physically abused: Not on file     Forced sexual activity: Not on file   Other Topics Concern    Not on file   Social History Narrative    Caffeine use    Work related stress     Vitals:    02/20/19 1323   BP: 142/98   Pulse: 82   Temp: 97 7 °F (36 5 °C)   SpO2: 98%   Weight: 127 kg (280 lb)   Height: 6' 2" (1 88 m)     Results for orders placed or performed in visit on 02/18/19   Lipid Panel with Direct LDL reflex   Result Value Ref Range    Total Cholesterol 140 <200 mg/dL    HDL 30 (L) >40 mg/dL    Triglycerides 115 <150 mg/dL    LDL Direct 89 mg/dL (calc)    Chol HDLC Ratio 4 7 <5 0 (calc)    Non-HDL Cholesterol 110 <130 mg/dL (calc)   Comprehensive metabolic panel   Result Value Ref Range    Glucose, Random 123 (H) 65 - 99 mg/dL    BUN 19 7 - 25 mg/dL    Creatinine 1 16 0 70 - 1 33 mg/dL    eGFR Non  72 > OR = 60 mL/min/1 73m2    eGFR  83 > OR = 60 mL/min/1 73m2    SL AMB BUN/CREATININE RATIO NOT APPLICABLE 6 - 22 (calc)    Sodium 141 135 - 146 mmol/L    Potassium 3 9 3 5 - 5 3 mmol/L    Chloride 102 98 - 110 mmol/L    CO2 32 20 - 32 mmol/L    SL AMB CALCIUM 9 4 8 6 - 10 3 mg/dL    Protein, Total 6 8 6 1 - 8 1 g/dL    Albumin 4 7 3 6 - 5 1 g/dL    Globulin 2 1 1 9 - 3 7 g/dL (calc)    Albumin/Globulin Ratio 2 2 1 0 - 2 5 (calc)    TOTAL BILIRUBIN 0 7 0 2 - 1 2 mg/dL    Alkaline Phosphatase 67 40 - 115 U/L    AST 24 10 - 35 U/L    ALT 40 9 - 46 U/L   TSH, 3rd generation   Result Value Ref Range    TSH 4 70 (H) 0 40 - 4 50 mIU/L   Hemoglobin A1c (w/out EAG)   Result Value Ref Range    Hemoglobin A1C 6 4 (H) <5 7 % of total Hgb     Weight (last 2 days)     Date/Time   Weight    02/20/19 1323   127 (280)            Body mass index is 35 95 kg/m²  BP      Temp      Pulse     Resp      SpO2        Vitals:    02/20/19 1323   Weight: 127 kg (280 lb)     Vitals:    02/20/19 1323   Weight: 127 kg (280 lb)       /98   Pulse 82   Temp 97 7 °F (36 5 °C)   Ht 6' 2" (1 88 m)   Wt 127 kg (280 lb)   SpO2 98%   BMI 35 95 kg/m²          Physical Exam   Constitutional: He appears well-developed and well-nourished  No distress  HENT:   Head: Normocephalic and atraumatic  Right Ear: External ear normal    Left Ear: External ear normal    Mouth/Throat: Oropharynx is clear and moist    Eyes: Pupils are equal, round, and reactive to light  Conjunctivae are normal  Right eye exhibits no discharge  Left eye exhibits no discharge  No scleral icterus  Neck: Neck supple  Cardiovascular: Normal rate, regular rhythm and normal heart sounds  Exam reveals no gallop and no friction rub  No murmur heard  Pulmonary/Chest: No respiratory distress  He has no wheezes  He has no rales  Abdominal: Soft  Bowel sounds are normal  He exhibits no distension and no mass  There is no tenderness  There is no rebound and no guarding  Musculoskeletal: He exhibits no edema or deformity  Lymphadenopathy:     He has no cervical adenopathy  Neurological: He is alert  Skin: He is not diaphoretic  Psychiatric: He has a normal mood and affect  His mood appears not anxious  He does not exhibit a depressed mood  He expresses no suicidal ideation

## 2019-03-13 ENCOUNTER — TELEPHONE (OUTPATIENT)
Dept: INTERNAL MEDICINE CLINIC | Facility: CLINIC | Age: 54
End: 2019-03-13

## 2019-03-13 NOTE — TELEPHONE ENCOUNTER
Patient is requesting to speak with you directly  Patient said he was told if he had problems with certain things he could reach out to you and that you will call him to discuss      Please advise

## 2019-06-25 LAB
ALBUMIN SERPL-MCNC: 4.3 G/DL (ref 3.6–5.1)
ALBUMIN/CREAT UR: 4 MCG/MG CREAT
ALBUMIN/GLOB SERPL: 2 (CALC) (ref 1–2.5)
ALP SERPL-CCNC: 65 U/L (ref 40–115)
ALT SERPL-CCNC: 41 U/L (ref 9–46)
AST SERPL-CCNC: 22 U/L (ref 10–35)
BILIRUB SERPL-MCNC: 0.3 MG/DL (ref 0.2–1.2)
BUN SERPL-MCNC: 20 MG/DL (ref 7–25)
BUN/CREAT SERPL: ABNORMAL (CALC) (ref 6–22)
CALCIUM SERPL-MCNC: 9.2 MG/DL (ref 8.6–10.3)
CHLORIDE SERPL-SCNC: 103 MMOL/L (ref 98–110)
CHOLEST SERPL-MCNC: 137 MG/DL
CHOLEST/HDLC SERPL: 4 (CALC)
CO2 SERPL-SCNC: 30 MMOL/L (ref 20–32)
CREAT SERPL-MCNC: 1.14 MG/DL (ref 0.7–1.33)
CREAT UR-MCNC: 227 MG/DL (ref 20–320)
GLOBULIN SER CALC-MCNC: 2.2 G/DL (CALC) (ref 1.9–3.7)
GLUCOSE SERPL-MCNC: 122 MG/DL (ref 65–99)
HBA1C MFR BLD: 6.9 % OF TOTAL HGB
HDLC SERPL-MCNC: 34 MG/DL
LDLC SERPL CALC-MCNC: 85 MG/DL (CALC)
MICROALBUMIN UR-MCNC: 0.9 MG/DL
NONHDLC SERPL-MCNC: 103 MG/DL (CALC)
POTASSIUM SERPL-SCNC: 4.3 MMOL/L (ref 3.5–5.3)
PROT SERPL-MCNC: 6.5 G/DL (ref 6.1–8.1)
SL AMB EGFR AFRICAN AMERICAN: 85 ML/MIN/1.73M2
SL AMB EGFR NON AFRICAN AMERICAN: 73 ML/MIN/1.73M2
SODIUM SERPL-SCNC: 139 MMOL/L (ref 135–146)
TRIGL SERPL-MCNC: 90 MG/DL
TSH SERPL-ACNC: 2.8 MIU/L (ref 0.4–4.5)

## 2019-06-27 ENCOUNTER — OFFICE VISIT (OUTPATIENT)
Dept: INTERNAL MEDICINE CLINIC | Facility: CLINIC | Age: 54
End: 2019-06-27
Payer: COMMERCIAL

## 2019-06-27 VITALS
SYSTOLIC BLOOD PRESSURE: 130 MMHG | BODY MASS INDEX: 36.01 KG/M2 | DIASTOLIC BLOOD PRESSURE: 100 MMHG | TEMPERATURE: 97.9 F | HEART RATE: 82 BPM | OXYGEN SATURATION: 98 % | HEIGHT: 74 IN | WEIGHT: 280.6 LBS

## 2019-06-27 DIAGNOSIS — F41.9 ANXIETY: ICD-10-CM

## 2019-06-27 DIAGNOSIS — E11.9 TYPE 2 DIABETES MELLITUS WITHOUT COMPLICATION, WITHOUT LONG-TERM CURRENT USE OF INSULIN (HCC): ICD-10-CM

## 2019-06-27 DIAGNOSIS — E13.9 DIABETES 1.5, MANAGED AS TYPE 2 (HCC): ICD-10-CM

## 2019-06-27 DIAGNOSIS — B35.3 TINEA PEDIS OF BOTH FEET: Primary | ICD-10-CM

## 2019-06-27 DIAGNOSIS — E66.01 CLASS 2 SEVERE OBESITY DUE TO EXCESS CALORIES WITH SERIOUS COMORBIDITY AND BODY MASS INDEX (BMI) OF 35.0 TO 35.9 IN ADULT (HCC): ICD-10-CM

## 2019-06-27 DIAGNOSIS — I10 ESSENTIAL HYPERTENSION: ICD-10-CM

## 2019-06-27 PROCEDURE — 99214 OFFICE O/P EST MOD 30 MIN: CPT | Performed by: INTERNAL MEDICINE

## 2019-06-27 PROCEDURE — 3008F BODY MASS INDEX DOCD: CPT | Performed by: INTERNAL MEDICINE

## 2019-06-27 RX ORDER — DULOXETIN HYDROCHLORIDE 60 MG/1
60 CAPSULE, DELAYED RELEASE ORAL DAILY
Qty: 30 CAPSULE | Refills: 3 | Status: SHIPPED | OUTPATIENT
Start: 2019-06-27 | End: 2019-09-30 | Stop reason: SDUPTHER

## 2019-07-29 ENCOUNTER — OFFICE VISIT (OUTPATIENT)
Dept: INTERNAL MEDICINE CLINIC | Facility: CLINIC | Age: 54
End: 2019-07-29
Payer: COMMERCIAL

## 2019-07-29 VITALS
TEMPERATURE: 99 F | RESPIRATION RATE: 16 BRPM | SYSTOLIC BLOOD PRESSURE: 126 MMHG | HEART RATE: 82 BPM | DIASTOLIC BLOOD PRESSURE: 88 MMHG | WEIGHT: 277 LBS | BODY MASS INDEX: 36.71 KG/M2 | OXYGEN SATURATION: 96 % | HEIGHT: 73 IN

## 2019-07-29 DIAGNOSIS — F41.9 ANXIETY: ICD-10-CM

## 2019-07-29 DIAGNOSIS — Z00.00 ANNUAL PHYSICAL EXAM: Primary | ICD-10-CM

## 2019-07-29 PROCEDURE — 99396 PREV VISIT EST AGE 40-64: CPT | Performed by: NURSE PRACTITIONER

## 2019-07-29 NOTE — PROGRESS NOTES
ADULT ANNUAL PHYSICAL  [facility] - [facility]    NAME: Frank Du  AGE: 49 y o  SEX: male  : 1965     DATE: 2019     Assessment and Plan:     Problem List Items Addressed This Visit        Other    Anxiety     Having a lot of marital problems  Suggested marriage counseling  Follow up with pcp to discuss depression/anxiety  No thoughts of suicide           Other Visit Diagnoses     Annual physical exam    -  Primary          Immunizations and preventive care screenings were discussed with patient today  Appropriate education was printed on patient's after visit summary  Counseling:  · BMI Counseling: Body mass index is 36 55 kg/m²  Discussed the patient's BMI with him  The BMI is above average  BMI counseling and education was provided to the patient  Nutrition recommendations include reducing portion sizes and decreasing overall calorie intake  Exercise recommendations include moderate aerobic physical activity for 150 minutes/week  Return in 1 year (on 2020)  Chief Complaint:     Chief Complaint   Patient presents with    Follow-up     forms to be filled out      History of Present Illness:     Adult Annual Physical   Patient here for a comprehensive physical exam  The patient reports no problems  Diet and Physical Activity  · Diet/Nutrition: well balanced diet  · Exercise: no formal exercise  Depression Screening  PHQ-9 Depression Screening    PHQ-9:    Frequency of the following problems over the past two weeks:            General Health  · Sleep: sleeps well  · Hearing: normal - bilateral   · Vision: no vision problems  · Dental: regular dental visits  ·      Review of Systems:     Review of Systems   Constitutional: Negative  HENT: Negative  Eyes: Negative  Respiratory: Negative  Cardiovascular: Negative  Gastrointestinal: Negative  Musculoskeletal: Negative  Neurological: Negative         Past Medical History:     Past Medical History:   Diagnosis Date    Diabetes mellitus ([facility] [address] )     Disease of thyroid gland     hypothyroid graves disease treated with radiation    Hyperlipidemia     Hypertension     Hyperthyroidism     L A  6/8/14    R    9/10/14      Irregular heart beat     Obesity     Sleep apnea     uses C-Pap machine    Venous insufficiency     L A 10/14/16   R    1/20/17        Past Surgical History:     Past Surgical History:   Procedure Laterality Date    CHOLECYSTECTOMY      GALLBLADDER SURGERY      Anastomosis    TX COLONOSCOPY FLX DX W/COLLJ SPEC WHEN PFRMD N/A 7/19/2017    Procedure: COLONOSCOPY;  Surgeon: Shazia Petersen MD;  [facility];   Service: Gastroenterology    UMBILICAL HERNIA REPAIR      VASCULAR SURGERY      VASECTOMY      vas deferens       Family History:     Family History   Problem Relation Age of Onset    Lung cancer Mother     Prostate cancer Family       Social History:     Social History     Socioeconomic History    Marital status: /Civil Union     Spouse name: None    Number of children: None    Years of education: None    Highest education level: None   Occupational History     Comment: job physical requirements heavy lifting   Social Needs    Financial resource strain: None    Food insecurity:     Worry: None     Inability: None    Transportation needs:     Medical: None     Non-medical: None   Tobacco Use    Smoking status: Never Smoker    Smokeless tobacco: Never Used   Substance and Sexual Activity    Alcohol use: Not Currently     Comment: rare / denied social drinker per [employer]    Drug use: No    Sexual activity: None   Lifestyle    Physical activity:     Days per week: None     Minutes per session: None    Stress: None   Relationships    Social connections:     Talks on phone: None     Gets together: None     Attends Gnosticism service: None     Active member of club or organization: None     Attends meetings of clubs or organizations: None     Relationship status: None    Intimate partner violence:     Fear of current or ex partner: None     Emotionally abused: None     Physically abused: None     Forced sexual activity: None   Other Topics Concern    None   Social History Narrative    Caffeine use    Work related stress      Current Medications:     Current Outpatient Medications   Medication Sig Dispense Refill    DULoxetine (CYMBALTA) 60 mg delayed release capsule Take 1 capsule (60 mg total) by mouth daily 30 capsule 3    econazole nitrate 1 % cream Apply topically daily 15 g 0    gabapentin (NEURONTIN) 300 mg capsule 1 tablets po Hs 90 capsule 1    levothyroxine 137 mcg tablet Take 1 tablet (137 mcg total) by mouth daily 90 tablet 1    lisinopril (ZESTRIL) 20 mg tablet Take 1 tablet (20 mg total) by mouth daily 90 tablet 2    metFORMIN (GLUCOPHAGE) 1000 MG tablet Take 1 tablet (1,000 mg total) by mouth 2 (two) times a day with meals 180 tablet 1    rosuvastatin (CRESTOR) 5 mg tablet Take 1 tablet (5 mg total) by mouth daily 90 tablet 1    glucose blood (FREESTYLE LITE) test strip by In Vitro route daily      HYDROcodone-acetaminophen (NORCO) 7 5-325 mg per tablet Take 1 tablet by mouth  0    Lancets (FREESTYLE) lancets by Does not apply route daily      levothyroxine 125 mcg tablet Take 1 tablet (125 mcg total) by mouth daily (Patient not taking: Reported on 7/29/2019) 90 tablet 1    rizatriptan (MAXALT) 10 MG tablet Take 10 mg by mouth once as needed for migraine May repeat in 2 hours if needed       No current facility-administered medications for this visit  Allergies:      Allergies   Allergen Reactions    Pollen Extract       Physical Exam:     /88 (BP Location: Left arm, Patient Position: Sitting, Cuff Size: Large)   Pulse 82   Temp 99 °F (37 2 °C) (Oral)   Resp 16   Ht 6' 1" (1 854 m)   Wt 126 kg (277 lb)   SpO2 96%   BMI 36 55 kg/m²     Physical Exam   Constitutional: He is oriented to person, place, and time  He appears well-developed and well-nourished  HENT:   Head: Normocephalic and atraumatic  Right Ear: External ear normal    Left Ear: External ear normal    Nose: Nose normal    Mouth/Throat: Oropharynx is clear and moist    Eyes: Pupils are equal, round, and reactive to light  Conjunctivae are normal    Neck: Normal range of motion  Neck supple  Cardiovascular: Normal rate and regular rhythm  Pulmonary/Chest: Effort normal and breath sounds normal    Abdominal: Soft  Bowel sounds are normal    Musculoskeletal: Normal range of motion  Neurological: He is alert and oriented to person, place, and time  Skin: Skin is warm and dry  Nursing note and vitals reviewed        ROBERT Mir  [facility]

## 2019-07-29 NOTE — ASSESSMENT & PLAN NOTE
Having a lot of marital problems  Suggested marriage counseling  Follow up with pcp to discuss depression/anxiety  No thoughts of suicide

## 2019-07-29 NOTE — PATIENT INSTRUCTIONS

## 2019-08-22 DIAGNOSIS — G25.81 RESTLESS LEG SYNDROME: ICD-10-CM

## 2019-08-22 DIAGNOSIS — E03.9 HYPOTHYROIDISM, UNSPECIFIED TYPE: ICD-10-CM

## 2019-08-22 RX ORDER — GABAPENTIN 300 MG/1
CAPSULE ORAL
Qty: 90 CAPSULE | Refills: 1 | Status: SHIPPED | OUTPATIENT
Start: 2019-08-22 | End: 2020-03-12

## 2019-08-22 RX ORDER — LEVOTHYROXINE SODIUM 137 UG/1
TABLET ORAL
Qty: 90 TABLET | Refills: 1 | Status: SHIPPED | OUTPATIENT
Start: 2019-08-22 | End: 2020-03-12

## 2019-09-30 DIAGNOSIS — F41.9 ANXIETY: ICD-10-CM

## 2019-09-30 RX ORDER — DULOXETIN HYDROCHLORIDE 60 MG/1
CAPSULE, DELAYED RELEASE ORAL
Qty: 90 CAPSULE | Refills: 1 | Status: SHIPPED | OUTPATIENT
Start: 2019-09-30 | End: 2020-03-24

## 2019-10-21 DIAGNOSIS — F41.9 ANXIETY: ICD-10-CM

## 2019-10-21 RX ORDER — DULOXETIN HYDROCHLORIDE 30 MG/1
CAPSULE, DELAYED RELEASE ORAL
Qty: 90 CAPSULE | Refills: 1 | Status: SHIPPED | OUTPATIENT
Start: 2019-10-21 | End: 2020-02-12 | Stop reason: ALTCHOICE

## 2019-11-04 ENCOUNTER — OFFICE VISIT (OUTPATIENT)
Dept: INTERNAL MEDICINE CLINIC | Facility: CLINIC | Age: 54
End: 2019-11-04
Payer: COMMERCIAL

## 2019-11-04 VITALS
SYSTOLIC BLOOD PRESSURE: 140 MMHG | HEART RATE: 84 BPM | BODY MASS INDEX: 37.67 KG/M2 | RESPIRATION RATE: 18 BRPM | TEMPERATURE: 97.7 F | WEIGHT: 284.25 LBS | HEIGHT: 73 IN | DIASTOLIC BLOOD PRESSURE: 100 MMHG | OXYGEN SATURATION: 97 %

## 2019-11-04 DIAGNOSIS — F32.A DEPRESSION, UNSPECIFIED DEPRESSION TYPE: ICD-10-CM

## 2019-11-04 DIAGNOSIS — E11.9 TYPE 2 DIABETES MELLITUS WITHOUT COMPLICATION, WITHOUT LONG-TERM CURRENT USE OF INSULIN (HCC): ICD-10-CM

## 2019-11-04 DIAGNOSIS — Z23 NEED FOR INFLUENZA VACCINATION: Primary | ICD-10-CM

## 2019-11-04 DIAGNOSIS — F51.01 PRIMARY INSOMNIA: ICD-10-CM

## 2019-11-04 DIAGNOSIS — D22.9 MULTIPLE ATYPICAL SKIN MOLES: ICD-10-CM

## 2019-11-04 PROCEDURE — 3008F BODY MASS INDEX DOCD: CPT | Performed by: INTERNAL MEDICINE

## 2019-11-04 PROCEDURE — 99214 OFFICE O/P EST MOD 30 MIN: CPT | Performed by: INTERNAL MEDICINE

## 2019-11-04 PROCEDURE — 90682 RIV4 VACC RECOMBINANT DNA IM: CPT

## 2019-11-04 PROCEDURE — 90471 IMMUNIZATION ADMIN: CPT

## 2019-11-04 RX ORDER — ZOLPIDEM TARTRATE 5 MG/1
5 TABLET ORAL
Qty: 30 TABLET | Refills: 0 | Status: SHIPPED | OUTPATIENT
Start: 2019-11-04 | End: 2019-12-20 | Stop reason: SDUPTHER
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
## 2019-11-05 NOTE — ASSESSMENT & PLAN NOTE
Lab Results   Component Value Date    HGBA1C 6 9 (H) 06/24/2019   I have counselled the pt to follow a healthy and balanced diet ,and recommend routine exercise  I will be ordering diabetic laboratories including comprehensive metabolic panel, hemoglobin A1c, urine microalbumin, lipid panel    Annual flu vaccine given
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
## 2019-11-05 NOTE — ASSESSMENT & PLAN NOTE
Patient did request a prescription for sleeping aid he has been trying over-the-counter melatonin he can discontinue this medication on Rx for Ambien 5 mg 1 p o  Q h s  P r n  Was provided to the patient no alcohol or driving after taking medicine he should not taking on nights when he is taking Neurontin  I have explained to him he must sleep at least 7-8 hours per night heel tried over the weekend the house by tolerates it    I did have the patient sign a narcotics contract, PDMP checked

## 2019-11-19 DIAGNOSIS — I10 ESSENTIAL HYPERTENSION: ICD-10-CM

## 2019-11-19 PROCEDURE — 4010F ACE/ARB THERAPY RXD/TAKEN: CPT | Performed by: INTERNAL MEDICINE

## 2019-11-19 RX ORDER — LISINOPRIL 20 MG/1
TABLET ORAL
Qty: 90 TABLET | Refills: 2 | Status: SHIPPED | OUTPATIENT
Start: 2019-11-19 | End: 2020-02-12

## 2019-12-20 ENCOUNTER — TELEPHONE (OUTPATIENT)
Dept: OTHER | Facility: OTHER | Age: 54
End: 2019-12-20

## 2019-12-20 DIAGNOSIS — F51.01 PRIMARY INSOMNIA: ICD-10-CM

## 2019-12-20 RX ORDER — ZOLPIDEM TARTRATE 5 MG/1
5 TABLET ORAL
Qty: 30 TABLET | Refills: 0 | Status: SHIPPED | OUTPATIENT
Start: 2019-12-20 | End: 2020-02-12 | Stop reason: SDUPTHER

## 2019-12-20 NOTE — TELEPHONE ENCOUNTER
Out of his Ambien and needs refills today as he is leaving for [place]      Dr Marcos Leong paged @ [phone]

## 2020-01-27 DIAGNOSIS — IMO0002 UNCONTROLLED TYPE 2 DIABETES MELLITUS WITH COMPLICATION, WITHOUT LONG-TERM CURRENT USE OF INSULIN: ICD-10-CM

## 2020-02-12 ENCOUNTER — OFFICE VISIT (OUTPATIENT)
Dept: INTERNAL MEDICINE CLINIC | Facility: CLINIC | Age: 55
End: 2020-02-12
Payer: COMMERCIAL

## 2020-02-12 VITALS
HEIGHT: 73 IN | RESPIRATION RATE: 16 BRPM | DIASTOLIC BLOOD PRESSURE: 100 MMHG | WEIGHT: 287 LBS | HEART RATE: 87 BPM | BODY MASS INDEX: 38.04 KG/M2 | SYSTOLIC BLOOD PRESSURE: 152 MMHG | OXYGEN SATURATION: 98 % | TEMPERATURE: 98.1 F

## 2020-02-12 DIAGNOSIS — M25.522 PAIN OF BOTH ELBOWS: ICD-10-CM

## 2020-02-12 DIAGNOSIS — G43.809 OTHER MIGRAINE WITHOUT STATUS MIGRAINOSUS, NOT INTRACTABLE: ICD-10-CM

## 2020-02-12 DIAGNOSIS — E78.5 HYPERLIPIDEMIA, UNSPECIFIED HYPERLIPIDEMIA TYPE: ICD-10-CM

## 2020-02-12 DIAGNOSIS — F51.01 PRIMARY INSOMNIA: ICD-10-CM

## 2020-02-12 DIAGNOSIS — IMO0002 UNCONTROLLED TYPE 2 DIABETES MELLITUS WITH COMPLICATION, WITHOUT LONG-TERM CURRENT USE OF INSULIN: Primary | ICD-10-CM

## 2020-02-12 DIAGNOSIS — R10.31 RIGHT LOWER QUADRANT ABDOMINAL PAIN: ICD-10-CM

## 2020-02-12 DIAGNOSIS — F41.9 ANXIETY: ICD-10-CM

## 2020-02-12 DIAGNOSIS — M25.521 PAIN OF BOTH ELBOWS: ICD-10-CM

## 2020-02-12 DIAGNOSIS — I10 ESSENTIAL HYPERTENSION: ICD-10-CM

## 2020-02-12 PROCEDURE — 99214 OFFICE O/P EST MOD 30 MIN: CPT | Performed by: INTERNAL MEDICINE

## 2020-02-12 PROCEDURE — 3051F HG A1C>EQUAL 7.0%<8.0%: CPT | Performed by: INTERNAL MEDICINE

## 2020-02-12 PROCEDURE — 3080F DIAST BP >= 90 MM HG: CPT | Performed by: INTERNAL MEDICINE

## 2020-02-12 PROCEDURE — 4010F ACE/ARB THERAPY RXD/TAKEN: CPT | Performed by: NURSE PRACTITIONER

## 2020-02-12 PROCEDURE — 2022F DILAT RTA XM EVC RTNOPTHY: CPT | Performed by: INTERNAL MEDICINE

## 2020-02-12 PROCEDURE — 1036F TOBACCO NON-USER: CPT | Performed by: INTERNAL MEDICINE

## 2020-02-12 PROCEDURE — 83036 HEMOGLOBIN GLYCOSYLATED A1C: CPT | Performed by: INTERNAL MEDICINE

## 2020-02-12 PROCEDURE — 3077F SYST BP >= 140 MM HG: CPT | Performed by: INTERNAL MEDICINE

## 2020-02-12 PROCEDURE — 4010F ACE/ARB THERAPY RXD/TAKEN: CPT | Performed by: INTERNAL MEDICINE

## 2020-02-12 RX ORDER — RIZATRIPTAN BENZOATE 10 MG/1
10 TABLET ORAL ONCE AS NEEDED
Qty: 27 TABLET | Refills: 1 | Status: SHIPPED | OUTPATIENT
Start: 2020-02-12

## 2020-02-12 RX ORDER — BUSPIRONE HYDROCHLORIDE 5 MG/1
5 TABLET ORAL DAILY PRN
Qty: 30 TABLET | Refills: 1 | Status: SHIPPED | OUTPATIENT
Start: 2020-02-12 | End: 2020-03-10

## 2020-02-12 RX ORDER — ZOLPIDEM TARTRATE 5 MG/1
5 TABLET ORAL
Qty: 30 TABLET | Refills: 0 | Status: SHIPPED | OUTPATIENT
Start: 2020-02-12 | End: 2020-03-30 | Stop reason: SDUPTHER

## 2020-02-12 RX ORDER — LISINOPRIL 30 MG/1
30 TABLET ORAL DAILY
Qty: 90 TABLET | Refills: 2 | Status: SHIPPED | OUTPATIENT
Start: 2020-02-12 | End: 2020-11-25 | Stop reason: SDUPTHER
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
## 2020-02-12 NOTE — PROGRESS NOTES
12/20/2019  1  12/20/2019  ZOLPIDEM TARTRATE 5 MG TABLET  30 0  30  [facility]  [id number]  [place] ([id number])  0   [place]    11/04/2019  1  11/04/2019  ZOLPIDEM TARTRATE 5 MG TABLET  30 0  30  [facility]  [id number]  [place]

## 2020-02-16 PROBLEM — M25.521 PAIN OF BOTH ELBOWS: Status: ACTIVE | Noted: 2020-02-16

## 2020-02-16 PROBLEM — IMO0002 UNCONTROLLED TYPE 2 DIABETES MELLITUS WITH COMPLICATION, WITHOUT LONG-TERM CURRENT USE OF INSULIN: Status: ACTIVE | Noted: 2020-02-16

## 2020-02-16 PROBLEM — R10.31 RIGHT LOWER QUADRANT ABDOMINAL PAIN: Status: ACTIVE | Noted: 2020-02-16

## 2020-02-16 PROBLEM — M25.522 PAIN OF BOTH ELBOWS: Status: ACTIVE | Noted: 2020-02-16

## 2020-02-16 PROBLEM — G43.909 MIGRAINE: Status: ACTIVE | Noted: 2020-02-16

## 2020-02-16 LAB — SL AMB POCT HEMOGLOBIN AIC: 7.2 (ref ?–6.5)

## 2020-02-16 NOTE — ASSESSMENT & PLAN NOTE
Lab Results   Component Value Date    HGBA1C 6 9 (H) 06/24/2019   I have counselled the pt to follow a healthy and balanced diet ,and recommend routine exercise  I will be ordering diabetic laboratories including comprehensive metabolic panel, hemoglobin A1c, urine microalbumin, lipid panel    Annual eye examination

## 2020-02-16 NOTE — ASSESSMENT & PLAN NOTE
Patient reports me he tried Maxalt which is very effective at controlling the migraine currently he is asymptomatic he would like a refill of Maxalt

## 2020-02-16 NOTE — ASSESSMENT & PLAN NOTE
Ongoing anxiety no suicidal ideation also he appears to be depressed he declines counseling will start BuSpar 5 mg once daily as needed for anxiety no driving if the medication make you tired

## 2020-02-16 NOTE — ASSESSMENT & PLAN NOTE
Patient does request I a refill of Ambien 5 mg 1 p o  Q h s  P r n   No alcohol no driving after taking medication ; PDMP checked

## 2020-02-16 NOTE — ASSESSMENT & PLAN NOTE
Lateral epicondylitis tennis elbow strap rest, ice check x-rays of bilateral elbows if symptoms not improve next month will consider physical therapy currently his symptoms are very mild

## 2020-02-16 NOTE — ASSESSMENT & PLAN NOTE
Suboptimal control increase lisinopril to 30 mg once daily check laboratories in 1 week after increase reduce stress, reduce sodium, reduce weight

## 2020-02-17 NOTE — TELEPHONE ENCOUNTER
02/12/2020  1  02/12/2020  ZOLPIDEM TARTRATE 5 MG TABLET  30 0  30  [facility]  [id number]  [place] ([id number])  0   [place]    12/20/2019  1  12/20/2019  ZOLPIDEM TARTRATE 5 MG TABLET  30 0  30  [facility]  [id number]  [place] ([id number])  0   [place]    11/04/2019  1  11/04/2019  ZOLPIDEM TARTRATE 5 MG TABLET  30 0  30  [facility]  [id number]  [place]          Per Dr Susi Luna request

## 2020-02-19 ENCOUNTER — HOSPITAL ENCOUNTER (OUTPATIENT)
Dept: CT IMAGING | Facility: HOSPITAL | Age: 55
Discharge: HOME | End: 2020-02-19
Payer: COMMERCIAL

## 2020-02-19 DIAGNOSIS — R10.31 RIGHT LOWER QUADRANT ABDOMINAL PAIN: ICD-10-CM

## 2020-02-19 PROCEDURE — 74176 CT ABD & PELVIS W/O CONTRAST: CPT

## 2020-03-09 DIAGNOSIS — F41.9 ANXIETY: ICD-10-CM

## 2020-03-10 RX ORDER — BUSPIRONE HYDROCHLORIDE 5 MG/1
5 TABLET ORAL DAILY PRN
Qty: 30 TABLET | Refills: 1 | Status: SHIPPED | OUTPATIENT
Start: 2020-03-10 | End: 2020-04-05

## 2020-03-12 DIAGNOSIS — G25.81 RESTLESS LEG SYNDROME: ICD-10-CM

## 2020-03-12 DIAGNOSIS — E03.9 HYPOTHYROIDISM, UNSPECIFIED TYPE: ICD-10-CM

## 2020-03-12 RX ORDER — GABAPENTIN 300 MG/1
CAPSULE ORAL
Qty: 90 CAPSULE | Refills: 1 | Status: SHIPPED | OUTPATIENT
Start: 2020-03-12 | End: 2020-08-25 | Stop reason: SDUPTHER

## 2020-03-12 RX ORDER — LEVOTHYROXINE SODIUM 137 UG/1
TABLET ORAL
Qty: 90 TABLET | Refills: 1 | Status: SHIPPED | OUTPATIENT
Start: 2020-03-12 | End: 2020-08-25 | Stop reason: SDUPTHER

## 2020-03-24 DIAGNOSIS — F41.9 ANXIETY: ICD-10-CM

## 2020-03-24 DIAGNOSIS — E78.5 HYPERLIPIDEMIA, UNSPECIFIED HYPERLIPIDEMIA TYPE: ICD-10-CM

## 2020-03-24 RX ORDER — DULOXETIN HYDROCHLORIDE 60 MG/1
CAPSULE, DELAYED RELEASE ORAL
Qty: 90 CAPSULE | Refills: 1 | Status: SHIPPED | OUTPATIENT
Start: 2020-03-24 | End: 2021-01-19

## 2020-03-24 RX ORDER — ROSUVASTATIN CALCIUM 5 MG/1
TABLET, COATED ORAL
Qty: 90 TABLET | Refills: 1 | Status: SHIPPED | OUTPATIENT
Start: 2020-03-24 | End: 2020-03-30 | Stop reason: SDUPTHER

## 2020-03-30 DIAGNOSIS — E78.5 HYPERLIPIDEMIA, UNSPECIFIED HYPERLIPIDEMIA TYPE: ICD-10-CM

## 2020-03-30 DIAGNOSIS — F51.01 PRIMARY INSOMNIA: ICD-10-CM

## 2020-03-30 RX ORDER — ROSUVASTATIN CALCIUM 5 MG/1
5 TABLET, COATED ORAL DAILY
Qty: 90 TABLET | Refills: 1 | Status: SHIPPED | OUTPATIENT
Start: 2020-03-30 | End: 2021-01-05

## 2020-03-30 RX ORDER — ZOLPIDEM TARTRATE 5 MG/1
5 TABLET ORAL
Qty: 30 TABLET | Refills: 0 | Status: SHIPPED | OUTPATIENT
Start: 2020-03-30 | End: 2020-05-20

## 2020-04-04 DIAGNOSIS — F41.9 ANXIETY: ICD-10-CM

## 2020-04-05 RX ORDER — BUSPIRONE HYDROCHLORIDE 5 MG/1
5 TABLET ORAL DAILY PRN
Qty: 30 TABLET | Refills: 1 | Status: SHIPPED | OUTPATIENT
Start: 2020-04-05 | End: 2020-04-19

## 2020-04-18 DIAGNOSIS — F41.9 ANXIETY: ICD-10-CM

## 2020-04-19 RX ORDER — BUSPIRONE HYDROCHLORIDE 5 MG/1
5 TABLET ORAL DAILY PRN
Qty: 90 TABLET | Refills: 1 | Status: SHIPPED | OUTPATIENT
Start: 2020-04-19 | End: 2021-08-22 | Stop reason: SDUPTHER

## 2020-04-23 DIAGNOSIS — IMO0002 UNCONTROLLED TYPE 2 DIABETES MELLITUS WITH COMPLICATION, WITHOUT LONG-TERM CURRENT USE OF INSULIN: ICD-10-CM

## 2020-05-19 DIAGNOSIS — F51.01 PRIMARY INSOMNIA: ICD-10-CM

## 2020-05-20 RX ORDER — ZOLPIDEM TARTRATE 5 MG/1
TABLET ORAL
Qty: 30 TABLET | Refills: 0 | Status: SHIPPED | OUTPATIENT
Start: 2020-05-20 | End: 2020-07-15 | Stop reason: SDUPTHER

## 2020-07-15 ENCOUNTER — OFFICE VISIT (OUTPATIENT)
Dept: INTERNAL MEDICINE CLINIC | Facility: CLINIC | Age: 55
End: 2020-07-15
Payer: COMMERCIAL

## 2020-07-15 VITALS
BODY MASS INDEX: 36.62 KG/M2 | WEIGHT: 276.3 LBS | DIASTOLIC BLOOD PRESSURE: 84 MMHG | SYSTOLIC BLOOD PRESSURE: 132 MMHG | HEART RATE: 75 BPM | TEMPERATURE: 97.3 F | HEIGHT: 73 IN | OXYGEN SATURATION: 97 % | RESPIRATION RATE: 16 BRPM

## 2020-07-15 DIAGNOSIS — Z00.00 ANNUAL PHYSICAL EXAM: Primary | ICD-10-CM

## 2020-07-15 DIAGNOSIS — Z12.5 SCREENING PSA (PROSTATE SPECIFIC ANTIGEN): ICD-10-CM

## 2020-07-15 DIAGNOSIS — IMO0002 UNCONTROLLED TYPE 2 DIABETES MELLITUS WITH COMPLICATION, WITHOUT LONG-TERM CURRENT USE OF INSULIN: ICD-10-CM

## 2020-07-15 DIAGNOSIS — F51.01 PRIMARY INSOMNIA: ICD-10-CM

## 2020-07-15 PROCEDURE — 3075F SYST BP GE 130 - 139MM HG: CPT | Performed by: NURSE PRACTITIONER

## 2020-07-15 PROCEDURE — 3008F BODY MASS INDEX DOCD: CPT | Performed by: NURSE PRACTITIONER

## 2020-07-15 PROCEDURE — 3051F HG A1C>EQUAL 7.0%<8.0%: CPT | Performed by: NURSE PRACTITIONER

## 2020-07-15 PROCEDURE — 99396 PREV VISIT EST AGE 40-64: CPT | Performed by: NURSE PRACTITIONER

## 2020-07-15 PROCEDURE — 3079F DIAST BP 80-89 MM HG: CPT | Performed by: NURSE PRACTITIONER

## 2020-07-15 RX ORDER — ZOLPIDEM TARTRATE 10 MG/1
5 TABLET ORAL
Qty: 30 TABLET | Refills: 2 | Status: SHIPPED | OUTPATIENT
Start: 2020-07-15 | End: 2020-08-25 | Stop reason: SDUPTHER

## 2020-07-15 RX ORDER — ZOLPIDEM TARTRATE 10 MG/1
5 TABLET ORAL
Qty: 30 TABLET | Refills: 2
Start: 2020-07-15 | End: 2020-07-15 | Stop reason: SDUPTHER

## 2020-07-15 NOTE — PROGRESS NOTES
ADULT ANNUAL PHYSICAL  [facility]    NAME: Frank Du  AGE: 48 y o  SEX: male  : 1965     DATE: 2020     Assessment and Plan:     Problem List Items Addressed This Visit        Endocrine    Uncontrolled type 2 diabetes mellitus with complication, without long-term current use of insulin ([facility] [address] )     Diabetes under control  Patient is eating healthy and taking his meds appropriately  No co of side effects  Lab Results   Component Value Date    HGBA1C 7 5 (H) 07/15/2020            Relevant Orders    Diabetic foot exam    Lipid panel    Comprehensive metabolic panel    HEMOGLOBIN A1C W/ EAG ESTIMATION       Other    Primary insomnia    Relevant Medications    zolpidem (AMBIEN) 10 mg tablet      Other Visit Diagnoses     Annual physical exam    -  Primary    Screening PSA (prostate specific antigen)        Relevant Orders    PSA, total and free          Immunizations and preventive care screenings were discussed with patient today  Appropriate education was printed on patient's after visit summary  Counseling:  · Exercise: the importance of regular exercise/physical activity was discussed  Recommend exercise 3-5 times per week for at least 30 minutes  Return in about 6 months (around 1/15/2021) for Next scheduled follow up  Chief Complaint:     Chief Complaint   Patient presents with    Physical Exam      History of Present Illness:     Adult Annual Physical   Patient here for a comprehensive physical exam  The patient reports no problems  Diet and Physical Activity  · Diet/Nutrition: well balanced diet  · Exercise: walking  Depression Screening  PHQ-9 Depression Screening    PHQ-9:    Frequency of the following problems over the past two weeks:            General Health  · Sleep: sleeps well  · Hearing: normal - bilateral   · Vision: no vision problems  · Dental: regular dental visits          Health  · Symptoms include: none     Review of Systems:     Review of Systems   Constitutional: Negative  HENT: Negative  Eyes: Negative  Respiratory: Negative  Cardiovascular: Negative  Gastrointestinal: Negative  Musculoskeletal: Negative  Neurological: Negative  Past Medical History:     Past Medical History:   Diagnosis Date    Diabetes mellitus ([facility] [address] )     Disease of thyroid gland     hypothyroid graves disease treated with radiation    Hyperlipidemia     Hypertension     Hyperthyroidism     L A  6/8/14    R    9/10/14      Irregular heart beat     Obesity     Sleep apnea     uses C-Pap machine    Venous insufficiency     L A 10/14/16   R    1/20/17        Past Surgical History:     Past Surgical History:   Procedure Laterality Date    CHOLECYSTECTOMY      GALLBLADDER SURGERY      Anastomosis    TX COLONOSCOPY FLX DX W/COLLJ SPEC WHEN PFRMD N/A 7/19/2017    Procedure: COLONOSCOPY;  Surgeon: Joanne Oneil MD;  [facility];   Service: Gastroenterology    UMBILICAL HERNIA REPAIR      VASCULAR SURGERY      VASECTOMY      vas deferens       Family History:     Family History   Problem Relation Age of Onset    Lung cancer Mother     Prostate cancer Family       Social History:        Social History     Socioeconomic History    Marital status: /Civil Union     Spouse name: Not on file    Number of children: Not on file    Years of education: Not on file    Highest education level: Not on file   Occupational History     Comment: job physical requirements heavy lifting   Social Needs    Financial resource strain: Not on file    Food insecurity:     Worry: Not on file     Inability: Not on file    Transportation needs:     Medical: Not on file     Non-medical: Not on file   Tobacco Use    Smoking status: Never Smoker    Smokeless tobacco: Never Used   Substance and Sexual Activity    Alcohol use: Not Currently     Comment: rare / denied social drinker per allscript    Drug use: No    Sexual activity: Not on file   Lifestyle    Physical activity:     Days per week: Not on file     Minutes per session: Not on file    Stress: Not on file   Relationships    Social connections:     Talks on phone: Not on file     Gets together: Not on file     Attends [employer]ist service: Not on file     Active member of club or organization: Not on file     Attends meetings of clubs or organizations: Not on file     Relationship status: Not on file    Intimate partner violence:     Fear of current or ex partner: Not on file     Emotionally abused: Not on file     Physically abused: Not on file     Forced sexual activity: Not on file   Other Topics Concern    Not on file   Social History Narrative    Caffeine use    Work related stress      Current Medications:     Current Outpatient Medications   Medication Sig Dispense Refill    busPIRone (BUSPAR) 5 mg tablet TAKE 1 TABLET (5 MG TOTAL) BY MOUTH DAILY AS NEEDED (ANXIETY) 90 tablet 1    DULoxetine (CYMBALTA) 60 mg delayed release capsule TAKE 1 CAPSULE BY MOUTH EVERY DAY 90 capsule 1    gabapentin (NEURONTIN) 300 mg capsule TAKE 1 CAPSULE AT BEDTIME 90 capsule 1    glucose blood (FREESTYLE LITE) test strip by In Vitro route daily      Lancets (FREESTYLE) lancets by Does not apply route daily      levothyroxine 137 mcg tablet TAKE 1 TABLET BY MOUTH EVERY DAY 90 tablet 1    lisinopril (ZESTRIL) 30 mg tablet Take 1 tablet (30 mg total) by mouth daily 90 tablet 2    metFORMIN (GLUCOPHAGE) 1000 MG tablet TAKE 1 TABLET BY MOUTH TWICE A DAY WITH MEALS 180 tablet 0    rizatriptan (MAXALT) 10 MG tablet Take 1 tablet (10 mg total) by mouth once as needed for migraine May repeat in 2 hours if needed 27 tablet 1    rosuvastatin (CRESTOR) 5 mg tablet Take 1 tablet (5 mg total) by mouth daily 90 tablet 1    zolpidem (AMBIEN) 10 mg tablet Take 0 5 tablets (5 mg total) by mouth daily at bedtime as needed for sleep 30 tablet 2 No current facility-administered medications for this visit  Allergies: Allergies   Allergen Reactions    Pollen Extract       Physical Exam:     /84   Pulse 75   Temp (!) 97 3 °F (36 3 °C) (Temporal)   Resp 16   Ht 6' 1" (1 854 m)   Wt 125 kg (276 lb 4 8 oz)   SpO2 97%   BMI 36 45 kg/m²     Physical Exam   Constitutional: He is oriented to person, place, and time  He appears well-developed and well-nourished  HENT:   Head: Normocephalic and atraumatic  Right Ear: External ear normal    Left Ear: External ear normal    Nose: Nose normal    Mouth/Throat: Oropharynx is clear and moist    Eyes: Pupils are equal, round, and reactive to light  Conjunctivae are normal    Neck: Normal range of motion  Neck supple  Cardiovascular: Normal rate and regular rhythm  Pulses are no weak pulses  Pulses:       Dorsalis pedis pulses are 2+ on the right side, and 2+ on the left side  Posterior tibial pulses are 2+ on the right side, and 2+ on the left side  Pulmonary/Chest: Effort normal and breath sounds normal    Musculoskeletal: Normal range of motion  Feet:   Right Foot:   Skin Integrity: Negative for ulcer, skin breakdown, erythema, warmth, callus or dry skin  Left Foot:   Skin Integrity: Negative for ulcer, skin breakdown, erythema, warmth, callus or dry skin  Neurological: He is alert and oriented to person, place, and time  Skin: Skin is warm and dry  Nursing note and vitals reviewed  Diabetic Foot Exam    Patient's shoes and socks removed  Right Foot/Ankle   Right Foot Inspection  Skin Exam: skin normal and skin intact no dry skin, no warmth, no callus, no erythema, no maceration, no abnormal color, no pre-ulcer, no ulcer and no callus                          Toe Exam: ROM and strength within normal limits  Sensory       Monofilament testing: intact  Vascular    The right DP pulse is 2+  The right PT pulse is 2+       Left Foot/Ankle  Left Foot Inspection  Skin Exam: skin normal and skin intactno dry skin, no warmth, no erythema, no maceration, normal color, no pre-ulcer, no ulcer and no callus                         Toe Exam: ROM and strength within normal limits                   Sensory       Monofilament: intact  Vascular    The left DP pulse is 2+  The left PT pulse is 2+  Assign Risk Category:  No deformity present; No loss of protective sensation;  No weak pulses       Risk: 0    Breanna Saleh, [address] [facility]

## 2020-07-16 LAB
ALBUMIN SERPL-MCNC: 4.6 G/DL (ref 3.6–5.1)
ALBUMIN/GLOB SERPL: 2 (CALC) (ref 1–2.5)
ALP SERPL-CCNC: 74 U/L (ref 35–144)
ALT SERPL-CCNC: 44 U/L (ref 9–46)
AST SERPL-CCNC: 27 U/L (ref 10–35)
BILIRUB SERPL-MCNC: 0.6 MG/DL (ref 0.2–1.2)
BUN SERPL-MCNC: 17 MG/DL (ref 7–25)
BUN/CREAT SERPL: ABNORMAL (CALC) (ref 6–22)
CALCIUM SERPL-MCNC: 9.4 MG/DL (ref 8.6–10.3)
CHLORIDE SERPL-SCNC: 101 MMOL/L (ref 98–110)
CHOLEST SERPL-MCNC: 160 MG/DL
CHOLEST/HDLC SERPL: 5.3 (CALC)
CO2 SERPL-SCNC: 30 MMOL/L (ref 20–32)
CREAT SERPL-MCNC: 1.25 MG/DL (ref 0.7–1.33)
EST. AVERAGE GLUCOSE BLD GHB EST-MCNC: 169 (CALC)
EST. AVERAGE GLUCOSE BLD GHB EST-SCNC: 9.3 (CALC)
GLOBULIN SER CALC-MCNC: 2.3 G/DL (CALC) (ref 1.9–3.7)
GLUCOSE SERPL-MCNC: 135 MG/DL (ref 65–99)
HBA1C MFR BLD: 7.5 % OF TOTAL HGB
HDLC SERPL-MCNC: 30 MG/DL
LDLC SERPL CALC-MCNC: 108 MG/DL (CALC)
NONHDLC SERPL-MCNC: 130 MG/DL (CALC)
POTASSIUM SERPL-SCNC: 4.1 MMOL/L (ref 3.5–5.3)
PROT SERPL-MCNC: 6.9 G/DL (ref 6.1–8.1)
PSA FREE MFR SERPL: 20 % (CALC)
PSA FREE SERPL-MCNC: 0.3 NG/ML
PSA SERPL-MCNC: 1.5 NG/ML
SL AMB EGFR AFRICAN AMERICAN: 75 ML/MIN/1.73M2
SL AMB EGFR NON AFRICAN AMERICAN: 65 ML/MIN/1.73M2
SODIUM SERPL-SCNC: 138 MMOL/L (ref 135–146)
TRIGL SERPL-MCNC: 110 MG/DL

## 2020-07-16 PROCEDURE — 3051F HG A1C>EQUAL 7.0%<8.0%: CPT | Performed by: NURSE PRACTITIONER

## 2020-07-17 NOTE — ASSESSMENT & PLAN NOTE
Diabetes under control  Patient is eating healthy and taking his meds appropriately  No co of side effects    Lab Results   Component Value Date    HGBA1C 7 5 (H) 07/15/2020

## 2020-07-21 DIAGNOSIS — IMO0002 UNCONTROLLED TYPE 2 DIABETES MELLITUS WITH COMPLICATION, WITHOUT LONG-TERM CURRENT USE OF INSULIN: ICD-10-CM

## 2020-07-30 ENCOUNTER — TELEPHONE (OUTPATIENT)
Dept: INTERNAL MEDICINE CLINIC | Facility: CLINIC | Age: 55
End: 2020-07-30

## 2020-07-30 NOTE — TELEPHONE ENCOUNTER
The keli called the office as his biometric screening form needed to be changed  The patients full name is Fernando Lenz  This was updated on 2 different area  The smoking section was completed on page 7  The form was faxed to [phone] [facility] [place]

## 2020-08-03 ENCOUNTER — CLINICAL SUPPORT (OUTPATIENT)
Dept: INTERNAL MEDICINE CLINIC | Facility: CLINIC | Age: 55
End: 2020-08-03

## 2020-08-03 VITALS — BODY MASS INDEX: 37.39 KG/M2 | TEMPERATURE: 97.3 F | WEIGHT: 283.4 LBS

## 2020-08-03 DIAGNOSIS — E11.9 TYPE 2 DIABETES MELLITUS WITHOUT COMPLICATION, WITHOUT LONG-TERM CURRENT USE OF INSULIN (HCC): Primary | ICD-10-CM

## 2020-08-03 DIAGNOSIS — F41.9 ANXIETY: ICD-10-CM

## 2020-08-03 NOTE — TELEPHONE ENCOUNTER
Per pharmacist encounter on 08/03/20, pending orders for signature/concurrence from [facility], DO    Semaglutide injection   Glucometer   Test strips   lancets

## 2020-08-03 NOTE — PROGRESS NOTES
Horacio, PharmD, BCACP    Reason for visit: Appointment with 48y o  year old for management of T2DM monitoring efficacy and tolerability of anti-diabetic medications  Current DM Regimen:   Metformin 1gm BID    ASSESSMENT/PLAN                                                                                     1  Type 2 diabetes: goal A1c <7% based on 2020 ADA guidelines  Most recent A1c above goal but not reflective of current treatment d/t limited rx compliance  No SMBG readings to review  Patient with ADR to metformin IR, would benefit from trial with metformin ER  Could benefit from trial with injectable GLP1 d/t patient rx adherence issues  BMP shows hyperglycemia  No serum B12 while on metformin     Microvascular complications: none  Macrovascular complications: none  (No) Aspirin (Yes ) Statin (Yes ) ACEI/ARB  Eye Exam:  2019   Foot Exam: 2020     Most recent labs and diabetes goals discussed with patient in clinic today   MEDICATIONS: Discussion with patient on converting metformin IR to ER or starting GLP1  D/t patient adherence issues, patient would prefer to trial injectable GLP1 for now and reassess need for metformin at later date  If PCP is in agreeance, will start semaglutide 0 25mg weekly x4 weeks then increase to 0 5mg weekly     o Will pend rx for PCP signature/concurrence  · Injectable Rx storage and administration:   (+) proper injection:   (+) proper storage:    - avoids excessive heat/sunlight     - stores unused vials in refrigerator    - pen in use at room temperature for no more than 56 days;  (+) visually inspects insulin prior to administration   (+) rotates site of administration: abdominal region, buttocks, thigh, or upper arm   (+) proper disposal of sharps   (+) uses new needle for each injection   (+) holds needle into skin for at least 5 seconds      SMBx/week, FBG, for now to get patient used to checking BG  Will try to increase frequency in future   TLCs: Re-enforced the importance of a Diabetic Diet, exercise 30 minutes 5 days a week as tolerated, weight loss/control   BMI Counseling: Body mass index is 37 39 kg/m²  The BMI is above normal  Nutrition recommendations include reducing portion sizes  Pharmacotherapy was ordered for patient to aid in weight loss  2  Sleep: patient takes duloxetine in AM and does not take consistently, which could be impacting sleep  Patient with significant life stressors at home but w/o SI/HI  Would benefit from taking duloxetine on daily prior nick additional rx adjustments  · Discussion with patient on trial of taking duloxetine in AM and need to take rx daily, voiced understanding  Patient aware rx may take several weeks to take effect  3  Medication Reconciliation: Medication list reviewed with pt at today's visit  Discrepancies as discussed below  - Medication list updated to reflect medications pt is currently taking   - Levothyroxine: discussion with patient to take rx with all other medications in the AM consistently, will update thyroid panel as indicated by PCP and adjust levothyroxine rx as necessary     - Medication adherence: patient will try taking all rx's in AM to improve adherence    Follow-Up: 4 weeks in person  _x_ Home Monitoring Records, BG      SUBJECTIVE                                                                                                              1  Medication Adherence: Medication list reviewed with patient, reports the following discrepancies/problems:   Zolpidem: still has difficulty maintaining sleep despite increasing rx dose   Duloxetine: takes rx in PM prior to sleep  Has not taken rx daily as he did not feel rx worked well but denies taking rx daily or giving rx an adequate trial      Misses doses:  Yes, reports he will frequently forget to take his medications - may take less than 50% of the time   Feels that it is because he tries to separate out his medications (especially levothyroxine) and then is unable to recall if he has taken his medications  If he is uncertain if he took his medications, he will just not take his medications  2  Medication Efficacy:    Review of Systems   Gastrointestinal: Positive for diarrhea (when he takes metformin)  Negative for nausea and vomiting  Psychiatric/Behavioral: Positive for sleep disturbance (reports difficulty maintaining sleep but zolpidem helps him fall asleep; may wake up several times during the night for no apparent reason )  Negative for self-injury and suicidal ideas  The patient is nervous/anxious (d/t life stressors at home)  SMBG readings (no log, per memory): has an old freestyle glucometer at home but does not check  3  Lifestyle: has been experiencing a lot of stress lately d/t home life  Does not eat a lot of snacks but states that he has not been watching his diet  Diabetes was previously controlled with his diet in the past but does not want to change what he is eating at the moment  Daily Beverages: goes through spurts of drinking lots of regular soda (2 liters/day) and diet soda, has been drinking a lot of diet dr Michelle Barlow lately  Aware that regular soda can increase blood sugars        Exercise: no formal routine     Social History     Tobacco Use   Smoking Status Never Smoker   Smokeless Tobacco Never Used     Social History     Substance and Sexual Activity   Alcohol Use Not Currently    Comment: rare / denied social drinker per allscript          OBJECTIVE                                                                                                      Vitals:    08/03/20 1654   Temp: (!) 97 3 °F (36 3 °C)   Weight: 129 kg (283 lb 6 4 oz)       Pertinent Lab Data:    Lab Results   Component Value Date    SODIUM 138 07/15/2020    K 4 1 07/15/2020     07/15/2020    CO2 30 07/15/2020    BUN 17 07/15/2020    CREATININE 1 25 07/15/2020    GLUC 135 (H) 07/15/2020    CALCIUM 9 4 07/15/2020       Lab Results   Component Value Date    HGBA1C 7 5 (H) 07/15/2020       No results found for: Sudheer Segovia    Microalbumin/Creatinine: 4 (6/2019)    Demographics  Interaction Method: Face to Face  Type of Intervention: New    Topic(s) Addressed  Diabetes  Device Education  Medication Management    Intervention(s) Made    Pharmacologic:  Medication Initiation: Ozempic, metformin ER    Medication Discontinuation: Metformin IR    Non-Pharmacologic:  Adherence Addressed    Other: SMBG    Tool(s) Used  Not Applicable    Time Spent:   Time Spent in Direct Patient Care: 45 minutes    Time Spent in Care Coordination: 40 minutes    Recommendation(s) Accepted by the Patient/Caregiver: Partially Accepted

## 2020-08-04 RX ORDER — LANCETS 33 GAUGE
1 EACH MISCELLANEOUS DAILY
Qty: 100 EACH | Refills: 1 | Status: SHIPPED | OUTPATIENT
Start: 2020-08-04

## 2020-08-04 RX ORDER — BLOOD SUGAR DIAGNOSTIC
STRIP MISCELLANEOUS
Qty: 100 EACH | Refills: 1 | Status: SHIPPED | OUTPATIENT
Start: 2020-08-04 | End: 2020-10-06

## 2020-08-04 RX ORDER — BLOOD-GLUCOSE METER
EACH MISCELLANEOUS ONCE
Qty: 1 KIT | Refills: 0 | Status: SHIPPED | OUTPATIENT
Start: 2020-08-04 | End: 2020-09-03

## 2020-08-25 DIAGNOSIS — E03.9 HYPOTHYROIDISM, UNSPECIFIED TYPE: ICD-10-CM

## 2020-08-25 DIAGNOSIS — G25.81 RESTLESS LEG SYNDROME: ICD-10-CM

## 2020-08-25 DIAGNOSIS — F51.01 PRIMARY INSOMNIA: ICD-10-CM

## 2020-08-26 DIAGNOSIS — E11.9 TYPE 2 DIABETES MELLITUS WITHOUT COMPLICATION, WITHOUT LONG-TERM CURRENT USE OF INSULIN (HCC): ICD-10-CM

## 2020-08-26 RX ORDER — GABAPENTIN 300 MG/1
300 CAPSULE ORAL
Qty: 90 CAPSULE | Refills: 0 | Status: SHIPPED | OUTPATIENT
Start: 2020-08-26 | End: 2020-10-05 | Stop reason: SDUPTHER

## 2020-08-26 RX ORDER — LEVOTHYROXINE SODIUM 137 UG/1
137 TABLET ORAL DAILY
Qty: 90 TABLET | Refills: 0 | Status: SHIPPED | OUTPATIENT
Start: 2020-08-26 | End: 2020-11-25 | Stop reason: SDUPTHER

## 2020-08-26 RX ORDER — SEMAGLUTIDE 1.34 MG/ML
INJECTION, SOLUTION SUBCUTANEOUS
Qty: 1 PEN | Refills: 6 | Status: SHIPPED | OUTPATIENT
Start: 2020-08-26 | End: 2020-09-04 | Stop reason: SDUPTHER

## 2020-08-26 RX ORDER — ZOLPIDEM TARTRATE 10 MG/1
5 TABLET ORAL
Qty: 30 TABLET | Refills: 0 | Status: SHIPPED | OUTPATIENT
Start: 2020-08-26 | End: 2020-09-03 | Stop reason: CLARIF

## 2020-09-03 ENCOUNTER — CLINICAL SUPPORT (OUTPATIENT)
Dept: INTERNAL MEDICINE CLINIC | Facility: CLINIC | Age: 55
End: 2020-09-03

## 2020-09-03 VITALS — WEIGHT: 275.4 LBS | TEMPERATURE: 96.1 F | BODY MASS INDEX: 36.33 KG/M2

## 2020-09-03 DIAGNOSIS — F51.01 PRIMARY INSOMNIA: Primary | ICD-10-CM

## 2020-09-03 DIAGNOSIS — E11.9 TYPE 2 DIABETES MELLITUS WITHOUT COMPLICATION, WITHOUT LONG-TERM CURRENT USE OF INSULIN (HCC): ICD-10-CM

## 2020-09-03 RX ORDER — ZOLPIDEM TARTRATE 6.25 MG/1
6.25 TABLET, FILM COATED, EXTENDED RELEASE ORAL
Qty: 30 TABLET | Refills: 1 | Status: SHIPPED | OUTPATIENT
Start: 2020-09-03 | End: 2020-10-05

## 2020-09-03 NOTE — TELEPHONE ENCOUNTER
Per pharmacist encounter on 09/03/20, pending orders for signature/concurrence from Junella Paula, DO    Zolpidem CR    Controlled Substance Review    [place] PDMP or [place]  reviewed: No red flags were identified; safe to proceed with prescription  Kristin Blend

## 2020-09-03 NOTE — PROGRESS NOTES
Horacio, PharmD, BCACP    Reason for visit: Appointment with 48y o  year old for management of T2DM monitoring efficacy and tolerability of anti-diabetic medications  Current DM Regimen:   Semaglutide 0 25mg weekly   Metformin 1000mg BID    ASSESSMENT/PLAN                                                                                     1  Type 2 diabetes: goal A1c <7% based on 2020 ADA guidelines  Most recent A1c above goal but not reflective of current treatment d/t limited rx compliance  In office BG to goal                Patient with ADR to metformin IR, would benefit from trial with metformin ER  Tolerating semaglutide well  BMP shows hyperglycemia  No serum B12 while on metformin                Microvascular complications: none  Macrovascular complications: none  (No) Aspirin     (Yes ) Statin    (Yes ) ACEI/ARB  Eye Exam:  1/2019   Foot Exam: 7/2020     Most recent labs and diabetes goals discussed with patient in clinic today   MEDICATIONS: Discussion with patient on converting to metformin ER however he would like to hold off for now as he is does not like to strain himself d/t previous hernia experience  If PCP is in agreeance, will continue with semaglutide 0 5mg weekly and metformin IR 1gm daily for now d/t patient preference; will assess increasing metformin dose at next appt   SMBG: once daily, FBG  Patient will bring in glucometer to appts for SMBG review   TLCs: Re-enforced the importance of a Diabetic Diet, exercise 30 minutes 5 days a week as tolerated, weight loss/control   BMI Counseling: Body mass index is 36 33 kg/m²  The BMI is above normal  Nutrition recommendations include reducing portion sizes  Pharmacotherapy was ordered for patient to aid in weight loss  2  Medication Reconciliation: Medication list reviewed with pt at today's visit   Discrepancies as discussed below    Medication list updated to reflect medications pt is currently taking  · Insomnia: per discussion with PCP, will have patient trial extended release zolpidem as this may have longer duration of action and may improve patient's ability to stay asleep; will start at 6 25mg HS PRN insomnia and consider dose increase if needed in the future  Will pend rx for PCP signature/concurrence  Follow-Up: 1 month  _x_ Home Monitoring Records, BG      SUBJECTIVE                                                                                                              1  Medication Adherence: Medication list reviewed with patient, reports the following discrepancies/problems:   busPIRone - has only taken a few doses since he originally filled rx in 4/2020   MetFORMIN - has only been taking one tablet daily   Ozempic (0 25 or 0 5 MG/DOSE) Sopn - took first dose of 0 5mg/dose this week   Zolpidem - out of rx for a few days, has been taking a full tablet as he has been having difficulty staying asleep; never had problems falling asleep and did not notice a change in sleep pattern when he increased zolpidem dose  States sleep has gotten worse since last appt; can fall asleep but can't stay asleep  Does not feel r/t RLS, pain, urination    Has been taking all medications daily as prescribed since last pharmacist appt, has been using pill box to set up appts  Misses doses:  no    2  Medication Efficacy:    Review of Systems   Gastrointestinal: Positive for diarrhea (but feels it is tolerable)  SMBG readings (no log, per memory): brought in glucometer to set up, did not set up on own as he was uncertain how    3  Lifestyle: continues to have a lot of stress lately d/t home life  Does not eat a lot of snacks but states that he has not been watching his diet  Has noticed he feels a little patterson sooner since starting semaglutide                   Daily Beverages: Has cut down on soda consumption and limiting to the weekend  Exercise: no formal routine     Social History     Tobacco Use   Smoking Status Never Smoker   Smokeless Tobacco Never Used     Social History     Substance and Sexual Activity   Alcohol Use Not Currently    Comment: rare / denied social drinker per allscript          OBJECTIVE                                                                                                      Vitals:    20 1628   Temp: (!) 96 1 °F (35 6 °C)   Weight: 125 kg (275 lb 6 4 oz)       In office B (no-lunch)    Pertinent Lab Data:    Lab Results   Component Value Date    SODIUM 138 07/15/2020    K 4 1 07/15/2020     07/15/2020    CO2 30 07/15/2020    BUN 17 07/15/2020    CREATININE 1 25 07/15/2020    GLUC 135 (H) 07/15/2020    CALCIUM 9 4 07/15/2020       Lab Results   Component Value Date    HGBA1C 7 5 (H) 07/15/2020     No results found for: Stevphen Opal    Microalbumin/Creatinine: 4 (2019)    Demographics  Interaction Method: Face to Face  Type of Intervention: Follow-Up    Topic(s) Addressed  Device Education  Diabetes  Medication Management    Intervention(s) Made    Pharmacologic:      Medication Conversion - Non-Preferred to Preferred: zolpidem    Non-Pharmacologic:      Other: SMBG, glucometer    Tool(s) Used  Not Applicable    Time Spent:   Time Spent in Direct Patient Care: 30 minutes    Time Spent in Care Coordination: 20 minutes    Recommendation(s) Accepted by the Patient/Caregiver:  All Accepted

## 2020-09-04 RX ORDER — SEMAGLUTIDE 1.34 MG/ML
0.5 INJECTION, SOLUTION SUBCUTANEOUS
Qty: 3 PEN | Refills: 1 | Status: SHIPPED | OUTPATIENT
Start: 2020-09-04 | End: 2020-11-25 | Stop reason: SDUPTHER

## 2020-10-05 ENCOUNTER — CLINICAL SUPPORT (OUTPATIENT)
Dept: INTERNAL MEDICINE CLINIC | Facility: CLINIC | Age: 55
End: 2020-10-05

## 2020-10-05 VITALS — BODY MASS INDEX: 35.49 KG/M2 | WEIGHT: 269 LBS | TEMPERATURE: 96.8 F

## 2020-10-05 DIAGNOSIS — E11.9 TYPE 2 DIABETES MELLITUS WITHOUT COMPLICATION, WITHOUT LONG-TERM CURRENT USE OF INSULIN (HCC): Primary | ICD-10-CM

## 2020-10-05 DIAGNOSIS — G25.81 RESTLESS LEG SYNDROME: ICD-10-CM

## 2020-10-05 RX ORDER — GABAPENTIN 300 MG/1
600 CAPSULE ORAL
Qty: 180 CAPSULE | Refills: 0 | Status: SHIPPED | OUTPATIENT
Start: 2020-10-05 | End: 2021-01-10

## 2020-10-06 DIAGNOSIS — E11.9 TYPE 2 DIABETES MELLITUS WITHOUT COMPLICATION, WITHOUT LONG-TERM CURRENT USE OF INSULIN (HCC): ICD-10-CM

## 2020-10-06 RX ORDER — BLOOD SUGAR DIAGNOSTIC
STRIP MISCELLANEOUS
Qty: 100 EACH | Refills: 1 | Status: SHIPPED | OUTPATIENT
Start: 2020-10-06

## 2020-11-05 ENCOUNTER — CLINICAL SUPPORT (OUTPATIENT)
Dept: INTERNAL MEDICINE CLINIC | Facility: CLINIC | Age: 55
End: 2020-11-05

## 2020-11-05 VITALS — TEMPERATURE: 97.6 F | WEIGHT: 281 LBS | BODY MASS INDEX: 37.07 KG/M2

## 2020-11-05 DIAGNOSIS — F51.01 PRIMARY INSOMNIA: ICD-10-CM

## 2020-11-05 DIAGNOSIS — E13.9 DIABETES 1.5, MANAGED AS TYPE 2 (HCC): Primary | ICD-10-CM

## 2020-11-05 DIAGNOSIS — F51.01 PRIMARY INSOMNIA: Primary | ICD-10-CM

## 2020-11-05 RX ORDER — TEMAZEPAM 7.5 MG/1
7.5 CAPSULE ORAL
Qty: 7 CAPSULE | Refills: 0 | Status: SHIPPED | OUTPATIENT
Start: 2020-11-05 | End: 2020-11-11 | Stop reason: DRUGHIGH

## 2020-11-06 ENCOUNTER — TELEPHONE (OUTPATIENT)
Dept: PSYCHIATRY | Facility: CLINIC | Age: 55
End: 2020-11-06

## 2020-11-09 ENCOUNTER — TELEPHONE (OUTPATIENT)
Dept: PSYCHIATRY | Facility: CLINIC | Age: 55
End: 2020-11-09

## 2020-11-11 DIAGNOSIS — F51.01 PRIMARY INSOMNIA: Primary | ICD-10-CM

## 2020-11-11 RX ORDER — TEMAZEPAM 15 MG/1
15 CAPSULE ORAL
Qty: 7 CAPSULE | Refills: 0 | Status: SHIPPED | OUTPATIENT
Start: 2020-11-11 | End: 2020-11-19 | Stop reason: DRUGHIGH

## 2020-11-19 DIAGNOSIS — F51.01 PRIMARY INSOMNIA: Primary | ICD-10-CM

## 2020-11-19 RX ORDER — TEMAZEPAM 30 MG/1
30 CAPSULE ORAL
Qty: 7 CAPSULE | Refills: 0 | Status: SHIPPED | OUTPATIENT
Start: 2020-11-19 | End: 2020-11-25

## 2020-11-25 DIAGNOSIS — I10 ESSENTIAL HYPERTENSION: ICD-10-CM

## 2020-11-25 DIAGNOSIS — IMO0002 UNCONTROLLED TYPE 2 DIABETES MELLITUS WITH COMPLICATION, WITHOUT LONG-TERM CURRENT USE OF INSULIN: ICD-10-CM

## 2020-11-25 DIAGNOSIS — E03.9 HYPOTHYROIDISM, UNSPECIFIED TYPE: ICD-10-CM

## 2020-11-25 DIAGNOSIS — E11.9 TYPE 2 DIABETES MELLITUS WITHOUT COMPLICATION, WITHOUT LONG-TERM CURRENT USE OF INSULIN (HCC): ICD-10-CM

## 2020-11-25 DIAGNOSIS — F51.01 PRIMARY INSOMNIA: ICD-10-CM

## 2020-11-25 RX ORDER — SEMAGLUTIDE 1.34 MG/ML
0.5 INJECTION, SOLUTION SUBCUTANEOUS
Qty: 3 PEN | Refills: 1 | Status: SHIPPED | OUTPATIENT
Start: 2020-11-25 | End: 2021-02-15 | Stop reason: SDUPTHER

## 2020-11-25 RX ORDER — LEVOTHYROXINE SODIUM 137 UG/1
137 TABLET ORAL DAILY
Qty: 90 TABLET | Refills: 1 | Status: SHIPPED | OUTPATIENT
Start: 2020-11-25 | End: 2021-08-22 | Stop reason: SDUPTHER

## 2020-11-25 RX ORDER — LISINOPRIL 30 MG/1
30 TABLET ORAL DAILY
Qty: 90 TABLET | Refills: 1 | Status: SHIPPED | OUTPATIENT
Start: 2020-11-25 | End: 2021-01-19 | Stop reason: SDUPTHER

## 2020-11-25 RX ORDER — TEMAZEPAM 30 MG/1
30 CAPSULE ORAL
Qty: 30 CAPSULE | Refills: 0 | Status: SHIPPED | OUTPATIENT
Start: 2020-11-25 | End: 2020-12-29 | Stop reason: SDUPTHER

## 2020-12-07 ENCOUNTER — TELEMEDICINE (OUTPATIENT)
Dept: INTERNAL MEDICINE CLINIC | Facility: CLINIC | Age: 55
End: 2020-12-07
Payer: COMMERCIAL

## 2020-12-07 DIAGNOSIS — Z20.822 EXPOSURE TO COVID-19 VIRUS: Primary | ICD-10-CM

## 2020-12-07 PROCEDURE — 99213 OFFICE O/P EST LOW 20 MIN: CPT | Performed by: NURSE PRACTITIONER

## 2020-12-08 ENCOUNTER — CLINICAL SUPPORT (OUTPATIENT)
Dept: INTERNAL MEDICINE CLINIC | Facility: CLINIC | Age: 55
End: 2020-12-08

## 2020-12-08 DIAGNOSIS — Z20.822 EXPOSURE TO COVID-19 VIRUS: Primary | ICD-10-CM

## 2020-12-08 PROCEDURE — U0003 INFECTIOUS AGENT DETECTION BY NUCLEIC ACID (DNA OR RNA); SEVERE ACUTE RESPIRATORY SYNDROME CORONAVIRUS 2 (SARS-COV-2) (CORONAVIRUS DISEASE [COVID-19]), AMPLIFIED PROBE TECHNIQUE, MAKING USE OF HIGH THROUGHPUT TECHNOLOGIES AS DESCRIBED BY CMS-2020-01-R: HCPCS | Performed by: NURSE PRACTITIONER

## 2020-12-09 LAB — SARS-COV-2 RNA SPEC QL NAA+PROBE: NOT DETECTED

## 2020-12-18 LAB
ALBUMIN SERPL-MCNC: 4.3 G/DL (ref 3.6–5.1)
ALBUMIN/GLOB SERPL: 2 (CALC) (ref 1–2.5)
ALP SERPL-CCNC: 69 U/L (ref 35–144)
ALT SERPL-CCNC: 33 U/L (ref 9–46)
AST SERPL-CCNC: 21 U/L (ref 10–35)
BILIRUB SERPL-MCNC: 0.5 MG/DL (ref 0.2–1.2)
BUN SERPL-MCNC: 21 MG/DL (ref 7–25)
BUN/CREAT SERPL: ABNORMAL (CALC) (ref 6–22)
CALCIUM SERPL-MCNC: 9.1 MG/DL (ref 8.6–10.3)
CHLORIDE SERPL-SCNC: 106 MMOL/L (ref 98–110)
CHOLEST SERPL-MCNC: 113 MG/DL
CHOLEST/HDLC SERPL: 3.8 (CALC)
CO2 SERPL-SCNC: 30 MMOL/L (ref 20–32)
CREAT SERPL-MCNC: 1.11 MG/DL (ref 0.7–1.33)
EST. AVERAGE GLUCOSE BLD GHB EST-MCNC: 160 (CALC)
EST. AVERAGE GLUCOSE BLD GHB EST-SCNC: 8.9 (CALC)
GLOBULIN SER CALC-MCNC: 2.2 G/DL (CALC) (ref 1.9–3.7)
GLUCOSE SERPL-MCNC: 109 MG/DL (ref 65–99)
HBA1C MFR BLD: 7.2 % OF TOTAL HGB
HDLC SERPL-MCNC: 30 MG/DL
LDLC SERPL CALC-MCNC: 66 MG/DL (CALC)
NONHDLC SERPL-MCNC: 83 MG/DL (CALC)
POTASSIUM SERPL-SCNC: 4 MMOL/L (ref 3.5–5.3)
PROT SERPL-MCNC: 6.5 G/DL (ref 6.1–8.1)
PSA FREE MFR SERPL: 31 % (CALC)
PSA FREE SERPL-MCNC: 0.5 NG/ML
PSA SERPL-MCNC: 1.6 NG/ML
SL AMB EGFR AFRICAN AMERICAN: 87 ML/MIN/1.73M2
SL AMB EGFR NON AFRICAN AMERICAN: 75 ML/MIN/1.73M2
SODIUM SERPL-SCNC: 143 MMOL/L (ref 135–146)
TRIGL SERPL-MCNC: 83 MG/DL

## 2020-12-29 DIAGNOSIS — F51.01 PRIMARY INSOMNIA: ICD-10-CM

## 2020-12-29 RX ORDER — TEMAZEPAM 30 MG/1
30 CAPSULE ORAL
Qty: 30 CAPSULE | Refills: 0 | Status: SHIPPED | OUTPATIENT
Start: 2020-12-29 | End: 2021-02-15 | Stop reason: SDUPTHER

## 2021-01-05 DIAGNOSIS — E78.5 HYPERLIPIDEMIA, UNSPECIFIED HYPERLIPIDEMIA TYPE: ICD-10-CM

## 2021-01-05 RX ORDER — ROSUVASTATIN CALCIUM 5 MG/1
TABLET, COATED ORAL
Qty: 90 TABLET | Refills: 1 | Status: SHIPPED | OUTPATIENT
Start: 2021-01-05 | End: 2021-02-23 | Stop reason: SDUPTHER

## 2021-01-10 DIAGNOSIS — G25.81 RESTLESS LEG SYNDROME: ICD-10-CM

## 2021-01-10 RX ORDER — GABAPENTIN 300 MG/1
600 CAPSULE ORAL
Qty: 180 CAPSULE | Refills: 0 | Status: SHIPPED | OUTPATIENT
Start: 2021-01-10 | End: 2021-07-05 | Stop reason: SDUPTHER

## 2021-01-19 ENCOUNTER — OFFICE VISIT (OUTPATIENT)
Dept: INTERNAL MEDICINE CLINIC | Facility: CLINIC | Age: 56
End: 2021-01-19
Payer: COMMERCIAL

## 2021-01-19 VITALS
HEIGHT: 74 IN | TEMPERATURE: 97 F | WEIGHT: 286.8 LBS | DIASTOLIC BLOOD PRESSURE: 100 MMHG | BODY MASS INDEX: 36.81 KG/M2 | OXYGEN SATURATION: 97 % | SYSTOLIC BLOOD PRESSURE: 140 MMHG | HEART RATE: 71 BPM

## 2021-01-19 DIAGNOSIS — E03.9 HYPOTHYROIDISM, UNSPECIFIED TYPE: Primary | ICD-10-CM

## 2021-01-19 DIAGNOSIS — I10 ESSENTIAL HYPERTENSION: ICD-10-CM

## 2021-01-19 DIAGNOSIS — F51.01 PRIMARY INSOMNIA: ICD-10-CM

## 2021-01-19 DIAGNOSIS — E11.9 TYPE 2 DIABETES MELLITUS WITHOUT COMPLICATION, WITHOUT LONG-TERM CURRENT USE OF INSULIN (HCC): ICD-10-CM

## 2021-01-19 DIAGNOSIS — E66.01 CLASS 2 SEVERE OBESITY DUE TO EXCESS CALORIES WITH SERIOUS COMORBIDITY AND BODY MASS INDEX (BMI) OF 36.0 TO 36.9 IN ADULT (HCC): ICD-10-CM

## 2021-01-19 PROCEDURE — 1036F TOBACCO NON-USER: CPT | Performed by: INTERNAL MEDICINE

## 2021-01-19 PROCEDURE — 3080F DIAST BP >= 90 MM HG: CPT | Performed by: INTERNAL MEDICINE

## 2021-01-19 PROCEDURE — 4010F ACE/ARB THERAPY RXD/TAKEN: CPT | Performed by: INTERNAL MEDICINE

## 2021-01-19 PROCEDURE — 3008F BODY MASS INDEX DOCD: CPT | Performed by: INTERNAL MEDICINE

## 2021-01-19 PROCEDURE — 3077F SYST BP >= 140 MM HG: CPT | Performed by: INTERNAL MEDICINE

## 2021-01-19 PROCEDURE — 99214 OFFICE O/P EST MOD 30 MIN: CPT | Performed by: INTERNAL MEDICINE

## 2021-01-19 RX ORDER — LISINOPRIL 40 MG/1
40 TABLET ORAL DAILY
Qty: 90 TABLET | Refills: 3 | Status: SHIPPED | OUTPATIENT
Start: 2021-01-19 | End: 2022-02-01

## 2021-01-19 RX ORDER — MINERAL OIL 100 MG/100ML
OIL ORAL; TOPICAL 2 TIMES DAILY
Qty: 1 DEVICE | Refills: 0 | Status: SHIPPED | OUTPATIENT
Start: 2021-01-19 | End: 2021-02-15 | Stop reason: SDUPTHER

## 2021-01-19 NOTE — ASSESSMENT & PLAN NOTE
Lab Results   Component Value Date    HGBA1C 7 2 (H) 12/16/2020   I have counselled the pt to follow a healthy and balanced diet ,and recommend routine exercise    Target A1c under 7 patient is motivated to make changes he will eliminate soda, healthy/balance diet, walking and weight loss or encourage

## 2021-01-19 NOTE — ASSESSMENT & PLAN NOTE
Mild depression, no suicidal ideation the patient has accepted the current relationship with his wife    He would like to hold off on counseling with Bernice Vegas, he reports me she would not go to marital counseling he is doing better with the sleeping pill Restoril only some mild tearfulness with that but he reports me that the benefits outweigh the risk and he would like to continue with this medication he had stopped the Cymbalta

## 2021-01-19 NOTE — ASSESSMENT & PLAN NOTE
Suboptimal controlled increase lisinopril to 40 mg once daily check BMP in 1 week get a blood pressure monitor and start monitoring the blood pressure twice daily reduce sodium, routine exercise and weight loss encouraged

## 2021-01-19 NOTE — PROGRESS NOTES
Assessment/Plan:    Hypothyroidism  Hypothyroidism controlled the patient is currently euthyroid I will be ordering a TSH prior to the next office visit and the patient will continue with current medical regiment; we will continue to monitor the patient's progress  Type 2 diabetes mellitus without complication, without long-term current use of insulin ([facility])    Lab Results   Component Value Date    HGBA1C 7 2 (H) 12/16/2020   I have counselled the pt to follow a healthy and balanced diet ,and recommend routine exercise  Target A1c under 7 patient is motivated to make changes he will eliminate soda, healthy/balance diet, walking and weight loss or encourage    Hypertension  Suboptimal controlled increase lisinopril to 40 mg once daily check BMP in 1 week get a blood pressure monitor and start monitoring the blood pressure twice daily reduce sodium, routine exercise and weight loss encouraged  Depression  Mild depression, no suicidal ideation the patient has accepted the current relationship with his wife  He would like to hold off on counseling with Leanna Romo, he reports me she would not go to marital counseling he is doing better with the sleeping pill Restoril only some mild tearfulness with that but he reports me that the benefits outweigh the risk and he would like to continue with this medication he had stopped the Cymbalta    Obesity  Obesity -I have counseled patient following healthy and balanced diet, I would like the patient to lose weight, I would like the patient exercise routinely; we will continue monitor the patient's progress      Primary insomnia  Improved continue Restoril 30 mg 1 p o  q h s  P r n  benefits outweigh the risks         Problem List Items Addressed This Visit        Endocrine    Type 2 diabetes mellitus without complication, without long-term current use of insulin ([facility] )       Lab Results   Component Value Date    HGBA1C 7 2 (H) 12/16/2020   I have counselled the pt to follow a healthy and balanced diet ,and recommend routine exercise  Target A1c under 7 patient is motivated to make changes he will eliminate soda, healthy/balance diet, walking and weight loss or encourage         Relevant Orders    Comprehensive metabolic panel    Hemoglobin A1C    Lipid Panel with Direct LDL reflex    Microalbumin / creatinine urine ratio    Hypothyroidism - Primary     Hypothyroidism controlled the patient is currently euthyroid I will be ordering a TSH prior to the next office visit and the patient will continue with current medical regiment; we will continue to monitor the patient's progress  Relevant Orders    TSH, 3rd generation       Cardiovascular and Mediastinum    Hypertension     Suboptimal controlled increase lisinopril to 40 mg once daily check BMP in 1 week get a blood pressure monitor and start monitoring the blood pressure twice daily reduce sodium, routine exercise and weight loss encouraged  Relevant Medications    lisinopril (ZESTRIL) 40 mg tablet    Blood Pressure Monitor NIKOLE    Other Relevant Orders    Basic metabolic panel       Other    Obesity     Obesity -I have counseled patient following healthy and balanced diet, I would like the patient to lose weight, I would like the patient exercise routinely; we will continue monitor the patient's progress  Primary insomnia     Improved continue Restoril 30 mg 1 p o  q h s  P r n  benefits outweigh the risks               RTO in 3-4 months call if any problems  Subjective:      Patient ID: Travon Sams is a 48 y o  male  HPI 56-year old male coming in for a follow up visit regarding hypothyroidism, type 2 diabetes, hypertension, obesity and insomnia/depression; The patient reports me compliant taking medications without untoward side effects the  The patient is here to review his medical condition, update me on the medical condition and the patient reports me no hospitalizations and no ER visits    He does report me sleep is better with the Restoril any tolerates it well he reports me some mild heard fullness he had noted since starting the Restoril but he wants to continue with medication benefits outweigh risk no SI he has been working with Martin Garza he reports me his diet is not good he is now drinking soda again  He has not been exercising routinely  He reports me difficulties with his relationship with his wife she does not love him anymore he states relationship for his daughter who has mental health problems he is trying to support her  He does understand if she develops any suicidal thoughts he has it call 911 and have her evaluated in the ER  dosent check bp at home    The following portions of the patient's history were reviewed and updated as appropriate: allergies, current medications, past family history, past medical history, past social history, past surgical history and problem list     Review of Systems   Constitutional: Negative for activity change, appetite change and unexpected weight change  HENT: Negative for congestion and postnasal drip  Eyes: Negative for visual disturbance  Respiratory: Negative for cough and shortness of breath  Cardiovascular: Negative for chest pain  Gastrointestinal: Negative for abdominal pain, diarrhea, nausea and vomiting  Neurological: Negative for dizziness, light-headedness and headaches  Psychiatric/Behavioral: Positive for sleep disturbance  Negative for suicidal ideas  The patient is nervous/anxious  Objective:    No follow-ups on file  No results found  Allergies   Allergen Reactions    Pollen Extract        Past Medical History:   Diagnosis Date    Diabetes mellitus ([address] )     Disease of thyroid gland     hypothyroid graves disease treated with radiation    Hyperlipidemia     Hypertension     Hyperthyroidism     L A  6/8/14    R    9/10/14      Irregular heart beat     Obesity     Sleep apnea     uses C-Pap machine  Venous insufficiency     L A 10/14/16   R    1/20/17       Past Surgical History:   Procedure Laterality Date    CHOLECYSTECTOMY      GALLBLADDER SURGERY      Anastomosis    IL COLONOSCOPY FLX DX W/COLLJ SPEC WHEN PFRMD N/A 7/19/2017    Procedure: COLONOSCOPY;  Surgeon: Arabella Keyes MD;  [facility];   Service: Gastroenterology    UMBILICAL HERNIA REPAIR      VASCULAR SURGERY      VASECTOMY      vas deferens      Current Outpatient Medications on File Prior to Visit   Medication Sig Dispense Refill    gabapentin (NEURONTIN) 300 mg capsule TAKE 2 CAPSULES (600 MG TOTAL) BY MOUTH DAILY AT BEDTIME 180 capsule 0    Lancets (OneTouch Delica Plus [id number]) MISC 1 each by Does not apply route daily 100 each 1    levothyroxine 137 mcg tablet Take 1 tablet (137 mcg total) by mouth daily 90 tablet 1    metFORMIN (GLUCOPHAGE) 1000 MG tablet Take 1 tablet (1,000 mg total) by mouth 2 (two) times a day with meals 180 tablet 1    OneTouch Verio test strip USE AS INSTRUCTED 100 each 1    rizatriptan (MAXALT) 10 MG tablet Take 1 tablet (10 mg total) by mouth once as needed for migraine May repeat in 2 hours if needed 27 tablet 1    rosuvastatin (CRESTOR) 5 mg tablet TAKE 1 TABLET BY MOUTH EVERY DAY 90 tablet 1    Semaglutide,0 25 or 0 5MG/DOS, (Ozempic, 0 25 or 0 5 MG/DOSE,) 2 MG/1 5ML SOPN Inject 0 5 mg under the skin every 7 days For diabetes - place rx on hold for patient as he has adequate supply 3 pen 1    temazepam (RESTORIL) 30 mg capsule Take 1 capsule (30 mg total) by mouth daily at bedtime as needed for sleep 30 capsule 0    [DISCONTINUED] lisinopril (ZESTRIL) 30 mg tablet Take 1 tablet (30 mg total) by mouth daily 90 tablet 1    busPIRone (BUSPAR) 5 mg tablet TAKE 1 TABLET (5 MG TOTAL) BY MOUTH DAILY AS NEEDED (ANXIETY) (Patient not taking: Reported on 9/3/2020) 90 tablet 1    [DISCONTINUED] DULoxetine (CYMBALTA) 60 mg delayed release capsule TAKE 1 CAPSULE BY MOUTH EVERY DAY (Patient not taking: Reported on 11/5/2020) 90 capsule 1     No current facility-administered medications on file prior to visit        Family History   Problem Relation Age of Onset    Lung cancer Mother     Prostate cancer Family      Social History     Socioeconomic History    Marital status: /Civil Union     Spouse name: Not on file    Number of children: Not on file    Years of education: Not on file    Highest education level: Not on file   Occupational History     Comment: job physical requirements heavy lifting   Social Needs    Financial resource strain: Not on file    Food insecurity     Worry: Not on file     Inability: Not on file   [employer] needs     Medical: Not on file     Non-medical: Not on file   Tobacco Use    Smoking status: Never Smoker    Smokeless tobacco: Never Used   Substance and Sexual Activity    Alcohol use: Not Currently     Comment: rare / denied social drinker per [employer]    Drug use: No    Sexual activity: Not on file   Lifestyle    Physical activity     Days per week: Not on file     Minutes per session: Not on file    Stress: Not on file   Relationships    Social connections     Talks on phone: Not on file     Gets together: Not on file     Attends Congregational service: Not on file     Active member of club or organization: Not on file     Attends meetings of clubs or organizations: Not on file     Relationship status: Not on file    Intimate partner violence     Fear of current or ex partner: Not on file     Emotionally abused: Not on file     Physically abused: Not on file     Forced sexual activity: Not on file   Other Topics Concern    Not on file   Social History Narrative    Caffeine use    Work related stress     Vitals:    01/19/21 1624   BP: 140/100   Pulse: 71   Temp: (!) 97 °F (36 1 °C)   TempSrc: Temporal   SpO2: 97%   Weight: 130 kg (286 lb 12 8 oz)   Height: 6' 2" (1 88 m)     Results for orders placed or performed in visit on 12/16/20   Lipid panel   Result Value Ref Range    Total Cholesterol 113 <200 mg/dL    HDL 30 (L) > OR = 40 mg/dL    Triglycerides 83 <150 mg/dL    LDL Calculated 66 mg/dL (calc)    Chol HDLC Ratio 3 8 <5 0 (calc)    Non-HDL Cholesterol 83 <130 mg/dL (calc)   Comprehensive metabolic panel   Result Value Ref Range    Glucose, Random 109 (H) 65 - 99 mg/dL    BUN 21 7 - 25 mg/dL    Creatinine 1 11 0 70 - 1 33 mg/dL    eGFR Non  75 > OR = 60 mL/min/1 73m2    eGFR  87 > OR = 60 mL/min/1 73m2    SL AMB BUN/CREATININE RATIO NOT APPLICABLE 6 - 22 (calc)    Sodium 143 135 - 146 mmol/L    Potassium 4 0 3 5 - 5 3 mmol/L    Chloride 106 98 - 110 mmol/L    CO2 30 20 - 32 mmol/L    Calcium 9 1 8 6 - 10 3 mg/dL    Protein, Total 6 5 6 1 - 8 1 g/dL    Albumin 4 3 3 6 - 5 1 g/dL    Globulin 2 2 1 9 - 3 7 g/dL (calc)    Albumin/Globulin Ratio 2 0 1 0 - 2 5 (calc)    TOTAL BILIRUBIN 0 5 0 2 - 1 2 mg/dL    Alkaline Phosphatase 69 35 - 144 U/L    AST 21 10 - 35 U/L    ALT 33 9 - 46 U/L   PSA, total and free   Result Value Ref Range    Prostate Specific Antigen Total 1 6 < OR = 4 0 ng/mL    PSA, Free 0 5 ng/mL    PSA, Free Pct 31 >25 % (calc)   Hemoglobin A1C With EAG   Result Value Ref Range    Hemoglobin A1C 7 2 (H) <5 7 % of total Hgb    Estimated Average Glucose 160 (calc)    Estimated Average Glucose (mmol/L) 8 9 (calc)     Weight (last 2 days)     Date/Time   Weight    01/19/21 1624   130 (286 8)            Body mass index is 36 82 kg/m²  BP      Temp     Pulse     Resp      SpO2        Vitals:    01/19/21 1624   Weight: 130 kg (286 lb 12 8 oz)     Vitals:    01/19/21 1624   Weight: 130 kg (286 lb 12 8 oz)       /100   Pulse 71   Temp (!) 97 °F (36 1 °C) (Temporal)   Ht 6' 2" (1 88 m)   Wt 130 kg (286 lb 12 8 oz)   SpO2 97%   BMI 36 82 kg/m²          Physical Exam  Constitutional:       General: He is not in acute distress  Appearance: He is well-developed  He is not diaphoretic     HENT:      Head: Normocephalic and atraumatic  Right Ear: External ear normal       Left Ear: External ear normal    Eyes:      General: No scleral icterus  Right eye: No discharge  Left eye: No discharge  Conjunctiva/sclera: Conjunctivae normal       Pupils: Pupils are equal, round, and reactive to light  Neck:      Musculoskeletal: Neck supple  Cardiovascular:      Rate and Rhythm: Normal rate and regular rhythm  Heart sounds: Normal heart sounds  No murmur  No friction rub  No gallop  Pulmonary:      Effort: No respiratory distress  Breath sounds: No wheezing or rales  Abdominal:      General: Bowel sounds are normal  There is no distension  Palpations: Abdomen is soft  There is no mass  Tenderness: There is no abdominal tenderness  There is no guarding or rebound  Musculoskeletal:         General: No deformity  Lymphadenopathy:      Cervical: No cervical adenopathy  Neurological:      Mental Status: He is alert  Psychiatric:         Mood and Affect: Mood is depressed  Mood is not anxious  Thought Content: Thought content does not include suicidal ideation

## 2021-02-15 DIAGNOSIS — I10 ESSENTIAL HYPERTENSION: ICD-10-CM

## 2021-02-15 DIAGNOSIS — F51.01 PRIMARY INSOMNIA: ICD-10-CM

## 2021-02-15 DIAGNOSIS — E11.9 TYPE 2 DIABETES MELLITUS WITHOUT COMPLICATION, WITHOUT LONG-TERM CURRENT USE OF INSULIN (HCC): ICD-10-CM

## 2021-02-15 RX ORDER — MINERAL OIL 100 MG/100ML
OIL ORAL; TOPICAL 2 TIMES DAILY
Qty: 1 DEVICE | Refills: 0 | Status: SHIPPED | OUTPATIENT
Start: 2021-02-15 | End: 2021-02-26

## 2021-02-15 RX ORDER — SEMAGLUTIDE 1.34 MG/ML
0.5 INJECTION, SOLUTION SUBCUTANEOUS
Qty: 3 PEN | Refills: 0 | Status: SHIPPED | OUTPATIENT
Start: 2021-02-15 | End: 2021-02-26

## 2021-02-16 RX ORDER — TEMAZEPAM 30 MG/1
30 CAPSULE ORAL
Qty: 30 CAPSULE | Refills: 0 | Status: SHIPPED | OUTPATIENT
Start: 2021-02-16 | End: 2021-03-25 | Stop reason: SDUPTHER

## 2021-02-23 DIAGNOSIS — E78.5 HYPERLIPIDEMIA, UNSPECIFIED HYPERLIPIDEMIA TYPE: ICD-10-CM

## 2021-02-23 RX ORDER — ROSUVASTATIN CALCIUM 5 MG/1
5 TABLET, COATED ORAL DAILY
Qty: 90 TABLET | Refills: 1 | Status: SHIPPED | OUTPATIENT
Start: 2021-02-23 | End: 2021-07-05 | Stop reason: SDUPTHER

## 2021-02-26 DIAGNOSIS — E11.9 TYPE 2 DIABETES MELLITUS WITHOUT COMPLICATION, WITHOUT LONG-TERM CURRENT USE OF INSULIN (HCC): Primary | ICD-10-CM

## 2021-02-26 RX ORDER — GLIMEPIRIDE 2 MG/1
2 TABLET ORAL
Qty: 30 TABLET | Refills: 5 | Status: SHIPPED | OUTPATIENT
Start: 2021-02-26 | End: 2021-09-08

## 2021-02-26 NOTE — TELEPHONE ENCOUNTER
Per pharmacist encounter on 02/26/21, pending orders for [facility]

## 2021-03-09 ENCOUNTER — DOCUMENTATION (OUTPATIENT)
Dept: INTERNAL MEDICINE CLINIC | Facility: CLINIC | Age: 56
End: 2021-03-09

## 2021-03-09 NOTE — PROGRESS NOTES
[id number] [facility] Susan, PharmD, Harper Rodriguez    The following is per review of patient's pertinent medical/medication history and [facility] message with patient    Reason for documentation: Duplicate encounter for tracking purposes  See [email or web address] message dated 2/22/2021    Patient has had weight loss, reduced regular soda intake, FBG trends < 130  Started taking glimepiride on 3/8, possible risk for hypoglycemia given weight loss and to goal FBG  Will have patient trial glimepiride for 2 weeks and assess need to continue rx  Informed patient on hypoglycemia risk/treatment  Demographics  Interaction Method: Virtual  Type of Intervention: Follow-Up    Topic(s) Addressed  Diabetes    Intervention(s) Made      Non-Pharmacologic:      Other    Tool(s) Used  Not Applicable    Time Spent:   Time Spent in Direct Patient Care: 10 minutes    Time Spent in Care Coordination: 10 minutes    Recommendation(s) Accepted by the Patient/Caregiver:  All Accepted

## 2021-03-10 DIAGNOSIS — Z23 ENCOUNTER FOR IMMUNIZATION: ICD-10-CM

## 2021-03-25 DIAGNOSIS — F51.01 PRIMARY INSOMNIA: ICD-10-CM

## 2021-03-26 RX ORDER — TEMAZEPAM 30 MG/1
30 CAPSULE ORAL
Qty: 30 CAPSULE | Refills: 0 | Status: SHIPPED | OUTPATIENT
Start: 2021-03-26 | End: 2021-05-20 | Stop reason: SDUPTHER

## 2021-04-20 LAB
ALBUMIN SERPL-MCNC: 4.2 G/DL (ref 3.6–5.1)
ALBUMIN/CREAT UR: 3 MCG/MG CREAT
ALBUMIN/GLOB SERPL: 1.8 (CALC) (ref 1–2.5)
ALP SERPL-CCNC: 66 U/L (ref 35–144)
ALT SERPL-CCNC: 29 U/L (ref 9–46)
AST SERPL-CCNC: 17 U/L (ref 10–35)
BILIRUB SERPL-MCNC: 0.6 MG/DL (ref 0.2–1.2)
BUN SERPL-MCNC: 23 MG/DL (ref 7–25)
BUN/CREAT SERPL: ABNORMAL (CALC) (ref 6–22)
CALCIUM SERPL-MCNC: 9.1 MG/DL (ref 8.6–10.3)
CHLORIDE SERPL-SCNC: 105 MMOL/L (ref 98–110)
CHOLEST SERPL-MCNC: 144 MG/DL
CHOLEST/HDLC SERPL: 4.6 (CALC)
CO2 SERPL-SCNC: 29 MMOL/L (ref 20–32)
CREAT SERPL-MCNC: 1.09 MG/DL (ref 0.7–1.33)
CREAT UR-MCNC: 261 MG/DL (ref 20–320)
GLOBULIN SER CALC-MCNC: 2.3 G/DL (CALC) (ref 1.9–3.7)
GLUCOSE SERPL-MCNC: 125 MG/DL (ref 65–99)
HBA1C MFR BLD: 5.9 % OF TOTAL HGB
HDLC SERPL-MCNC: 31 MG/DL
LDLC SERPL CALC-MCNC: 94 MG/DL (CALC)
MICROALBUMIN UR-MCNC: 0.8 MG/DL
NONHDLC SERPL-MCNC: 113 MG/DL (CALC)
POTASSIUM SERPL-SCNC: 4 MMOL/L (ref 3.5–5.3)
PROT SERPL-MCNC: 6.5 G/DL (ref 6.1–8.1)
SL AMB EGFR AFRICAN AMERICAN: 89 ML/MIN/1.73M2
SL AMB EGFR NON AFRICAN AMERICAN: 77 ML/MIN/1.73M2
SODIUM SERPL-SCNC: 141 MMOL/L (ref 135–146)
TRIGL SERPL-MCNC: 92 MG/DL
TSH SERPL-ACNC: 3.62 MIU/L (ref 0.4–4.5)

## 2021-04-20 PROCEDURE — 3044F HG A1C LEVEL LT 7.0%: CPT | Performed by: INTERNAL MEDICINE

## 2021-04-20 PROCEDURE — 3061F NEG MICROALBUMINURIA REV: CPT | Performed by: INTERNAL MEDICINE

## 2021-04-20 RX ORDER — LISINOPRIL 30 MG/1
30 TABLET ORAL DAILY
COMMUNITY
Start: 2021-02-23 | End: 2021-05-31

## 2021-04-21 ENCOUNTER — OFFICE VISIT (OUTPATIENT)
Dept: INTERNAL MEDICINE CLINIC | Facility: CLINIC | Age: 56
End: 2021-04-21
Payer: COMMERCIAL

## 2021-04-21 VITALS
WEIGHT: 270.8 LBS | HEART RATE: 76 BPM | HEIGHT: 74 IN | OXYGEN SATURATION: 99 % | RESPIRATION RATE: 16 BRPM | SYSTOLIC BLOOD PRESSURE: 132 MMHG | BODY MASS INDEX: 34.75 KG/M2 | DIASTOLIC BLOOD PRESSURE: 82 MMHG

## 2021-04-21 DIAGNOSIS — R07.89 ATYPICAL CHEST PAIN: ICD-10-CM

## 2021-04-21 DIAGNOSIS — Z11.4 SCREENING FOR HIV (HUMAN IMMUNODEFICIENCY VIRUS): ICD-10-CM

## 2021-04-21 DIAGNOSIS — E03.9 HYPOTHYROIDISM, UNSPECIFIED TYPE: Primary | ICD-10-CM

## 2021-04-21 DIAGNOSIS — E11.9 TYPE 2 DIABETES MELLITUS WITHOUT COMPLICATION, WITHOUT LONG-TERM CURRENT USE OF INSULIN (HCC): ICD-10-CM

## 2021-04-21 DIAGNOSIS — I10 ESSENTIAL HYPERTENSION: ICD-10-CM

## 2021-04-21 DIAGNOSIS — K63.5 POLYP OF COLON, UNSPECIFIED PART OF COLON, UNSPECIFIED TYPE: ICD-10-CM

## 2021-04-21 PROCEDURE — 3075F SYST BP GE 130 - 139MM HG: CPT | Performed by: INTERNAL MEDICINE

## 2021-04-21 PROCEDURE — 99214 OFFICE O/P EST MOD 30 MIN: CPT | Performed by: INTERNAL MEDICINE

## 2021-04-21 PROCEDURE — 3079F DIAST BP 80-89 MM HG: CPT | Performed by: INTERNAL MEDICINE

## 2021-04-21 PROCEDURE — 3008F BODY MASS INDEX DOCD: CPT | Performed by: INTERNAL MEDICINE

## 2021-04-21 PROCEDURE — 1036F TOBACCO NON-USER: CPT | Performed by: INTERNAL MEDICINE

## 2021-04-21 NOTE — PROGRESS NOTES
Assessment/Plan:    Hypothyroidism   Hypothyroidism controlled the patient is currently euthyroid I will be ordering a TSH prior to the next office visit and the patient will continue with current medical regiment; we will continue to monitor the patient's progress  Continue levothyroxine 137 mcg once daily    Type 2 diabetes mellitus without complication, without long-term current use of insulin ([facility])    Lab Results   Component Value Date    HGBA1C 5 9 (H) 04/19/2021    I have counselled the pt to follow a healthy and balanced diet ,and recommend routine exercise  I will be ordering diabetic laboratories including comprehensive metabolic panel, hemoglobin A1c, urine microalbumin, lipid panel  Continue metformin continue monitor home blood sugar readings     yes symptoms are compatible with possible low blood sugar which are very rare I have asked him to check his blood sugar when this occurs and take something that contain sugar also  He can stop the Amaryl    Hypertension   Hypertension - controlled, I have counseled patient following healthy balance diet, I would like the patient reduce sodium, exercise routinely, I would like the patient continued the med current medical regiment and we will continue to monitor  Continue lisinopril 30 mg once daily    Atypical chest pain   Likely related to stress at work declines a cardiac workup including stress test/informed refusal will call if the symptoms change or worsen         Problem List Items Addressed This Visit        Endocrine    Type 2 diabetes mellitus without complication, without long-term current use of insulin ([facility] [address] )       Lab Results   Component Value Date    HGBA1C 5 9 (H) 04/19/2021    I have counselled the pt to follow a healthy and balanced diet ,and recommend routine exercise  I will be ordering diabetic laboratories including comprehensive metabolic panel, hemoglobin A1c, urine microalbumin, lipid panel   Continue metformin continue monitor home blood sugar readings     yes symptoms are compatible with possible low blood sugar which are very rare I have asked him to check his blood sugar when this occurs and take something that contain sugar also  He can stop the Amaryl         Relevant Orders    Ambulatory Referral to Ophthalmology    Comprehensive metabolic panel    Hemoglobin A1C    Lipid Panel with Direct LDL reflex    Hypothyroidism - Primary      Hypothyroidism controlled the patient is currently euthyroid I will be ordering a TSH prior to the next office visit and the patient will continue with current medical regiment; we will continue to monitor the patient's progress  Continue levothyroxine 137 mcg once daily         Relevant Orders    TSH, 3rd generation       Cardiovascular and Mediastinum    Hypertension      Hypertension - controlled, I have counseled patient following healthy balance diet, I would like the patient reduce sodium, exercise routinely, I would like the patient continued the med current medical regiment and we will continue to monitor  Continue lisinopril 30 mg once daily         Relevant Medications    lisinopril (ZESTRIL) 30 mg tablet       Other    Atypical chest pain      Likely related to stress at work declines a cardiac workup including stress test/informed refusal will call if the symptoms change or worsen           Other Visit Diagnoses     Screening for HIV (human immunodeficiency virus)        Polyp of colon, unspecified part of colon, unspecified type        Relevant Orders    Ambulatory referral to Gastroenterology         RTO in 6 months call if any problems    Subjective:      Patient ID: Monet Knight is a 48 y o  male  HPI 48-year old male coming in for a follow up visit regarding type 2 diabetes, hypothyroidism, hypertension and atypical chest pain; The patient reports me compliant taking medications without untoward side effects the    The patient is here to review his medical condition, update me on the medical condition and the patient reports me no hospitalizations and no ER visits  every once in a while once a month he feels a little bit faint he does not syncopized he takes something containing sugar and this resolves he does not check blood sugar at this point but plans to do so for future has occurred when he is exerting himself cutting wood  No chest pain her breath racing heart the symptoms are very infrequent  Reports me able to sleep very well with the Restoril tolerated well depression anxious SI  The following portions of the patient's history were reviewed and updated as appropriate: allergies, current medications, past family history, past medical history, past social history, past surgical history and problem list    he does reports me mild chest pain when he is anxious not exertional symptoms are intermittent he does not want a cardiac workup he is sleeping much better with the Restoril and tolerating well his anxiety levels are somewhat improved he does report ongoing stress at work   Review of Systems   Constitutional: Negative for activity change, appetite change and unexpected weight change  HENT: Negative for congestion and postnasal drip  Eyes: Negative for visual disturbance  Respiratory: Negative for cough and shortness of breath  Cardiovascular: Negative for chest pain ( atypical chestPain with anxiety/ stress not exertional intermittent)  Gastrointestinal: Negative for abdominal pain, diarrhea, nausea and vomiting  Neurological: Negative for dizziness, light-headedness and headaches  Objective:    No follow-ups on file  No results found  Allergies   Allergen Reactions    Pollen Extract        Past Medical History:   Diagnosis Date    Diabetes mellitus ([facility] [address] )     Disease of thyroid gland     hypothyroid graves disease treated with radiation    Hyperlipidemia     Hypertension     Hyperthyroidism     L A  6/8/14    R    9/10/14      Irregular heart beat     Obesity     Sleep apnea     uses C-Pap machine    Venous insufficiency     L A 10/14/16   R    1/20/17       Past Surgical History:   Procedure Laterality Date    CHOLECYSTECTOMY      GALLBLADDER SURGERY      Anastomosis    [facility] COLONOSCOPY FLX DX W/COLLJ SPEC WHEN PFRMD N/A 7/19/2017    Procedure: COLONOSCOPY;  Surgeon: Gissel Burks MD;  [facility]: [facility]; Service: Gastroenterology    UMBILICAL HERNIA REPAIR      VASCULAR SURGERY      VASECTOMY      vas deferens      Current Outpatient Medications on File Prior to Visit   Medication Sig Dispense Refill    gabapentin (NEURONTIN) 300 mg capsule TAKE 2 CAPSULES (600 MG TOTAL) BY MOUTH DAILY AT BEDTIME 180 capsule 0    glimepiride (AMARYL) 2 mg tablet Take 1 tablet (2 mg total) by mouth daily with breakfast 30 tablet 5    Lancets (OneTouch Delica Plus [id number]) MISC 1 each by Does not apply route daily 100 each 1    levothyroxine 137 mcg tablet Take 1 tablet (137 mcg total) by mouth daily 90 tablet 1    lisinopril (ZESTRIL) 40 mg tablet Take 1 tablet (40 mg total) by mouth daily 90 tablet 3    metFORMIN (GLUCOPHAGE) 1000 MG tablet Take 1 tablet (1,000 mg total) by mouth 2 (two) times a day with meals 180 tablet 1    OneTouch Verio test strip USE AS INSTRUCTED 100 each 1    rizatriptan (MAXALT) 10 MG tablet Take 1 tablet (10 mg total) by mouth once as needed for migraine May repeat in 2 hours if needed 27 tablet 1    rosuvastatin (CRESTOR) 5 mg tablet Take 1 tablet (5 mg total) by mouth daily 90 tablet 1    temazepam (RESTORIL) 30 mg capsule Take 1 capsule (30 mg total) by mouth daily at bedtime as needed for sleep 30 capsule 0    busPIRone (BUSPAR) 5 mg tablet TAKE 1 TABLET (5 MG TOTAL) BY MOUTH DAILY AS NEEDED (ANXIETY) (Patient not taking: Reported on 9/3/2020) 90 tablet 1    lisinopril (ZESTRIL) 30 mg tablet Take 30 mg by mouth daily       No current facility-administered medications on file prior to visit        Family History Problem Relation Age of Onset    Lung cancer Mother     Prostate cancer Family      Social History     Socioeconomic History    Marital status: /Civil Union     Spouse name: Not on file    Number of children: Not on file    Years of education: Not on file    Highest education level: Not on file   Occupational History     Comment: job physical requirements heavy lifting   Social Needs    Financial resource strain: Not on file    Food insecurity     Worry: Not on file     Inability: Not on file    Transportation needs     Medical: Not on file     Non-medical: Not on file   Tobacco Use    Smoking status: Never Smoker    Smokeless tobacco: Never Used   Substance and Sexual Activity    Alcohol use: Not Currently     Comment: rare / denied social drinker per [employer]    Drug use: No    Sexual activity: Not on file   Lifestyle    Physical activity     Days per week: Not on file     Minutes per session: Not on file    Stress: Not on file   Relationships    Social connections     Talks on phone: Not on file     Gets together: Not on file     Attends [employer] service: Not on file     Active member of club or organization: Not on file     Attends meetings of clubs or organizations: Not on file     Relationship status: Not on file    Intimate partner violence     Fear of current or ex partner: Not on file     Emotionally abused: Not on file     Physically abused: Not on file     Forced sexual activity: Not on file   Other Topics Concern    Not on file   Social History Narrative    Caffeine use    Work related stress     Vitals:    04/21/21 1730   BP: 132/82   Pulse: 76   Resp: 16   SpO2: 99%   Weight: 123 kg (270 lb 12 8 oz)   Height: 6' 2" (1 88 m)     Results for orders placed or performed in visit on 04/19/21   Lipid Panel with Direct LDL reflex   Result Value Ref Range    Total Cholesterol 144 <200 mg/dL    HDL 31 (L) > OR = 40 mg/dL    Triglycerides 92 <150 mg/dL    LDL Calculated 94 mg/dL (calc)    Chol HDLC Ratio 4 6 <5 0 (calc)    Non-HDL Cholesterol 113 <130 mg/dL (calc)   Microalbumin, Random Urine (W/Creatinine)   Result Value Ref Range    Creatinine, Urine 261 20 - 320 mg/dL    Microalbum  ,U,Random 0 8 See Note: mg/dL    Microalb/Creat Ratio 3 <30 mcg/mg creat   Comprehensive metabolic panel   Result Value Ref Range    Glucose, Random 125 (H) 65 - 99 mg/dL    BUN 23 7 - 25 mg/dL    Creatinine 1 09 0 70 - 1 33 mg/dL    eGFR Non  77 > OR = 60 mL/min/1 73m2    eGFR  89 > OR = 60 mL/min/1 73m2    SL AMB BUN/CREATININE RATIO NOT APPLICABLE 6 - 22 (calc)    Sodium 141 135 - 146 mmol/L    Potassium 4 0 3 5 - 5 3 mmol/L    Chloride 105 98 - 110 mmol/L    CO2 29 20 - 32 mmol/L    Calcium 9 1 8 6 - 10 3 mg/dL    Protein, Total 6 5 6 1 - 8 1 g/dL    Albumin 4 2 3 6 - 5 1 g/dL    Globulin 2 3 1 9 - 3 7 g/dL (calc)    Albumin/Globulin Ratio 1 8 1 0 - 2 5 (calc)    TOTAL BILIRUBIN 0 6 0 2 - 1 2 mg/dL    Alkaline Phosphatase 66 35 - 144 U/L    AST 17 10 - 35 U/L    ALT 29 9 - 46 U/L   TSH, 3rd generation   Result Value Ref Range    TSH 3 62 0 40 - 4 50 mIU/L   Hemoglobin A1c (w/out EAG)   Result Value Ref Range    Hemoglobin A1C 5 9 (H) <5 7 % of total Hgb     Weight (last 2 days)     None        Body mass index is 34 77 kg/m²  BP      Temp      Pulse     Resp      SpO2        Vitals:    04/21/21 1730   Weight: 123 kg (270 lb 12 8 oz)     Vitals:    04/21/21 1730   Weight: 123 kg (270 lb 12 8 oz)       /82   Pulse 76   Resp 16   Ht 6' 2" (1 88 m)   Wt 123 kg (270 lb 12 8 oz)   SpO2 99%   BMI 34 77 kg/m²          Physical Exam  Constitutional:       General: He is not in acute distress  Appearance: He is well-developed  He is not diaphoretic  HENT:      Head: Normocephalic and atraumatic  Right Ear: External ear normal       Left Ear: External ear normal    Eyes:      General: No scleral icterus  Right eye: No discharge           Left eye: No discharge  Conjunctiva/sclera: Conjunctivae normal       Pupils: Pupils are equal, round, and reactive to light  Neck:      Musculoskeletal: Neck supple  Cardiovascular:      Rate and Rhythm: Normal rate and regular rhythm  Heart sounds: Normal heart sounds  No murmur  No friction rub  No gallop  Pulmonary:      Effort: No respiratory distress  Breath sounds: No wheezing or rales  Abdominal:      General: Bowel sounds are normal  There is no distension  Palpations: Abdomen is soft  There is no mass  Tenderness: There is no abdominal tenderness  There is no guarding or rebound  Musculoskeletal:         General: No deformity  Lymphadenopathy:      Cervical: No cervical adenopathy  Neurological:      Mental Status: He is alert  Psychiatric:         Mood and Affect: Mood is anxious  Mood is not depressed  Thought Content: Thought content does not include suicidal ideation

## 2021-04-22 ENCOUNTER — IMMUNIZATIONS (OUTPATIENT)
Dept: FAMILY MEDICINE CLINIC | Facility: HOSPITAL | Age: 56
End: 2021-04-22

## 2021-04-22 DIAGNOSIS — Z23 ENCOUNTER FOR IMMUNIZATION: Primary | ICD-10-CM

## 2021-04-22 PROCEDURE — 91300 SARS-COV-2 / COVID-19 MRNA VACCINE (PFIZER-BIONTECH) 30 MCG: CPT | Performed by: PHYSICIAN ASSISTANT

## 2021-04-22 PROCEDURE — 0001A SARS-COV-2 / COVID-19 MRNA VACCINE (PFIZER-BIONTECH) 30 MCG: CPT | Performed by: PHYSICIAN ASSISTANT

## 2021-04-22 NOTE — ASSESSMENT & PLAN NOTE
Hypothyroidism controlled the patient is currently euthyroid I will be ordering a TSH prior to the next office visit and the patient will continue with current medical regiment; we will continue to monitor the patient's progress  Helical Rim Advancement Flap Text: The defect edges were debeveled with a #15 blade scalpel.  Given the location of the defect and the proximity to free margins (helical rim) a double helical rim advancement flap was deemed most appropriate.  Using a sterile surgical marker, the appropriate advancement flaps were drawn incorporating the defect and placing the expected incisions between the helical rim and antihelix where possible.  The area thus outlined was incised through and through with a #15 scalpel blade.  With a skin hook and iris scissors, the flaps were gently and sharply undermined and freed up.

## 2021-04-25 PROBLEM — R07.89 ATYPICAL CHEST PAIN: Status: ACTIVE | Noted: 2021-04-25

## 2021-04-25 NOTE — ASSESSMENT & PLAN NOTE
Lab Results   Component Value Date    HGBA1C 5 9 (H) 04/19/2021    I have counselled the pt to follow a healthy and balanced diet ,and recommend routine exercise  I will be ordering diabetic laboratories including comprehensive metabolic panel, hemoglobin A1c, urine microalbumin, lipid panel   Continue metformin continue monitor home blood sugar readings     yes symptoms are compatible with possible low blood sugar which are very rare I have asked him to check his blood sugar when this occurs and take something that contain sugar also  He can stop the Amaryl

## 2021-04-25 NOTE — ASSESSMENT & PLAN NOTE
Hypertension - controlled, I have counseled patient following healthy balance diet, I would like the patient reduce sodium, exercise routinely, I would like the patient continued the med current medical regiment and we will continue to monitor   Continue lisinopril 30 mg once daily

## 2021-04-25 NOTE — ASSESSMENT & PLAN NOTE
Hypothyroidism controlled the patient is currently euthyroid I will be ordering a TSH prior to the next office visit and the patient will continue with current medical regiment; we will continue to monitor the patient's progress   Continue levothyroxine 137 mcg once daily

## 2021-04-25 NOTE — ASSESSMENT & PLAN NOTE
Likely related to stress at work declines a cardiac workup including stress test/informed refusal will call if the symptoms change or worsen

## 2021-05-13 ENCOUNTER — IMMUNIZATIONS (OUTPATIENT)
Dept: FAMILY MEDICINE CLINIC | Facility: HOSPITAL | Age: 56
End: 2021-05-13

## 2021-05-13 DIAGNOSIS — Z23 ENCOUNTER FOR IMMUNIZATION: Primary | ICD-10-CM

## 2021-05-13 PROCEDURE — 0002A SARS-COV-2 / COVID-19 MRNA VACCINE (PFIZER-BIONTECH) 30 MCG: CPT

## 2021-05-13 PROCEDURE — 91300 SARS-COV-2 / COVID-19 MRNA VACCINE (PFIZER-BIONTECH) 30 MCG: CPT

## 2021-05-20 DIAGNOSIS — F51.01 PRIMARY INSOMNIA: ICD-10-CM

## 2021-05-20 RX ORDER — TEMAZEPAM 30 MG/1
30 CAPSULE ORAL
Qty: 30 CAPSULE | Refills: 0 | Status: SHIPPED | OUTPATIENT
Start: 2021-05-20 | End: 2021-07-05 | Stop reason: SDUPTHER

## 2021-05-31 DIAGNOSIS — I10 ESSENTIAL (PRIMARY) HYPERTENSION: ICD-10-CM

## 2021-05-31 PROCEDURE — 4010F ACE/ARB THERAPY RXD/TAKEN: CPT | Performed by: INTERNAL MEDICINE

## 2021-05-31 RX ORDER — LISINOPRIL 30 MG/1
TABLET ORAL
Qty: 90 TABLET | Refills: 1 | Status: SHIPPED | OUTPATIENT
Start: 2021-05-31 | End: 2022-04-05 | Stop reason: ALTCHOICE

## 2021-07-05 DIAGNOSIS — E78.5 HYPERLIPIDEMIA, UNSPECIFIED HYPERLIPIDEMIA TYPE: ICD-10-CM

## 2021-07-05 DIAGNOSIS — G25.81 RESTLESS LEG SYNDROME: ICD-10-CM

## 2021-07-05 DIAGNOSIS — F51.01 PRIMARY INSOMNIA: ICD-10-CM

## 2021-07-07 RX ORDER — ROSUVASTATIN CALCIUM 5 MG/1
5 TABLET, COATED ORAL DAILY
Qty: 90 TABLET | Refills: 0 | Status: SHIPPED | OUTPATIENT
Start: 2021-07-07 | End: 2021-12-15 | Stop reason: SDUPTHER

## 2021-07-07 RX ORDER — GABAPENTIN 300 MG/1
600 CAPSULE ORAL
Qty: 180 CAPSULE | Refills: 0 | Status: SHIPPED | OUTPATIENT
Start: 2021-07-07 | End: 2021-10-29 | Stop reason: SDUPTHER

## 2021-07-07 RX ORDER — TEMAZEPAM 30 MG/1
30 CAPSULE ORAL
Qty: 30 CAPSULE | Refills: 0 | Status: SHIPPED | OUTPATIENT
Start: 2021-07-07 | End: 2021-08-22 | Stop reason: SDUPTHER

## 2021-08-22 DIAGNOSIS — E03.9 HYPOTHYROIDISM, UNSPECIFIED TYPE: ICD-10-CM

## 2021-08-22 DIAGNOSIS — F41.9 ANXIETY: ICD-10-CM

## 2021-08-22 DIAGNOSIS — F51.01 PRIMARY INSOMNIA: ICD-10-CM

## 2021-08-23 RX ORDER — BUSPIRONE HYDROCHLORIDE 5 MG/1
5 TABLET ORAL DAILY PRN
Qty: 90 TABLET | Refills: 1 | Status: SHIPPED | OUTPATIENT
Start: 2021-08-23 | End: 2021-10-29 | Stop reason: SDUPTHER

## 2021-08-23 RX ORDER — TEMAZEPAM 30 MG/1
30 CAPSULE ORAL
Qty: 30 CAPSULE | Refills: 0 | Status: SHIPPED | OUTPATIENT
Start: 2021-08-23 | End: 2021-10-29 | Stop reason: SDUPTHER

## 2021-08-23 RX ORDER — LEVOTHYROXINE SODIUM 137 UG/1
137 TABLET ORAL DAILY
Qty: 90 TABLET | Refills: 1 | Status: SHIPPED | OUTPATIENT
Start: 2021-08-23 | End: 2022-05-25

## 2021-08-23 NOTE — TELEPHONE ENCOUNTER
Requested medication(s) are due for refill today: Yes  Patient has already received a courtesy refill: No  Other reason request has been forwarded to provider: PDMP checked, last filled 7/7/21, #30

## 2021-09-08 DIAGNOSIS — E11.9 TYPE 2 DIABETES MELLITUS WITHOUT COMPLICATION, WITHOUT LONG-TERM CURRENT USE OF INSULIN (HCC): ICD-10-CM

## 2021-09-08 RX ORDER — GLIMEPIRIDE 2 MG/1
TABLET ORAL
Qty: 90 TABLET | Refills: 1 | Status: SHIPPED | OUTPATIENT
Start: 2021-09-08 | End: 2022-03-15

## 2021-10-05 ENCOUNTER — OFFICE VISIT (OUTPATIENT)
Dept: INTERNAL MEDICINE CLINIC | Facility: CLINIC | Age: 56
End: 2021-10-05
Payer: COMMERCIAL

## 2021-10-05 VITALS
DIASTOLIC BLOOD PRESSURE: 90 MMHG | BODY MASS INDEX: 34.01 KG/M2 | WEIGHT: 265 LBS | SYSTOLIC BLOOD PRESSURE: 142 MMHG | RESPIRATION RATE: 16 BRPM | HEART RATE: 76 BPM | OXYGEN SATURATION: 99 % | TEMPERATURE: 96.4 F | HEIGHT: 74 IN

## 2021-10-05 DIAGNOSIS — Z23 NEED FOR INFLUENZA VACCINATION: ICD-10-CM

## 2021-10-05 DIAGNOSIS — Z00.00 ANNUAL PHYSICAL EXAM: Primary | ICD-10-CM

## 2021-10-05 DIAGNOSIS — Z12.11 SCREENING FOR MALIGNANT NEOPLASM OF COLON: ICD-10-CM

## 2021-10-05 DIAGNOSIS — E11.9 TYPE 2 DIABETES MELLITUS WITHOUT COMPLICATION, WITHOUT LONG-TERM CURRENT USE OF INSULIN (HCC): ICD-10-CM

## 2021-10-05 PROCEDURE — 90682 RIV4 VACC RECOMBINANT DNA IM: CPT

## 2021-10-05 PROCEDURE — 3080F DIAST BP >= 90 MM HG: CPT | Performed by: NURSE PRACTITIONER

## 2021-10-05 PROCEDURE — 1036F TOBACCO NON-USER: CPT | Performed by: NURSE PRACTITIONER

## 2021-10-05 PROCEDURE — 3077F SYST BP >= 140 MM HG: CPT | Performed by: NURSE PRACTITIONER

## 2021-10-05 PROCEDURE — 3008F BODY MASS INDEX DOCD: CPT | Performed by: NURSE PRACTITIONER

## 2021-10-05 PROCEDURE — 99396 PREV VISIT EST AGE 40-64: CPT | Performed by: NURSE PRACTITIONER

## 2021-10-05 PROCEDURE — 90471 IMMUNIZATION ADMIN: CPT

## 2021-10-06 LAB
ALBUMIN SERPL-MCNC: 4.5 G/DL (ref 3.6–5.1)
ALBUMIN/GLOB SERPL: 2 (CALC) (ref 1–2.5)
ALP SERPL-CCNC: 72 U/L (ref 35–144)
ALT SERPL-CCNC: 22 U/L (ref 9–46)
AST SERPL-CCNC: 16 U/L (ref 10–35)
BILIRUB SERPL-MCNC: 0.7 MG/DL (ref 0.2–1.2)
BUN SERPL-MCNC: 13 MG/DL (ref 7–25)
BUN/CREAT SERPL: ABNORMAL (CALC) (ref 6–22)
CALCIUM SERPL-MCNC: 9.4 MG/DL (ref 8.6–10.3)
CHLORIDE SERPL-SCNC: 103 MMOL/L (ref 98–110)
CHOLEST SERPL-MCNC: 126 MG/DL
CHOLEST/HDLC SERPL: 4.3 (CALC)
CO2 SERPL-SCNC: 30 MMOL/L (ref 20–32)
CREAT SERPL-MCNC: 1.13 MG/DL (ref 0.7–1.33)
EST. AVERAGE GLUCOSE BLD GHB EST-MCNC: 120 (CALC)
EST. AVERAGE GLUCOSE BLD GHB EST-SCNC: 6.6 (CALC)
GLOBULIN SER CALC-MCNC: 2.2 G/DL (CALC) (ref 1.9–3.7)
GLUCOSE SERPL-MCNC: 102 MG/DL (ref 65–99)
HBA1C MFR BLD: 5.8 % OF TOTAL HGB
HDLC SERPL-MCNC: 29 MG/DL
LDLC SERPL CALC-MCNC: 77 MG/DL (CALC)
NONHDLC SERPL-MCNC: 97 MG/DL (CALC)
POTASSIUM SERPL-SCNC: 3.9 MMOL/L (ref 3.5–5.3)
PROT SERPL-MCNC: 6.7 G/DL (ref 6.1–8.1)
SL AMB EGFR AFRICAN AMERICAN: 84 ML/MIN/1.73M2
SL AMB EGFR NON AFRICAN AMERICAN: 73 ML/MIN/1.73M2
SODIUM SERPL-SCNC: 139 MMOL/L (ref 135–146)
TRIGL SERPL-MCNC: 114 MG/DL

## 2021-10-06 PROCEDURE — 3044F HG A1C LEVEL LT 7.0%: CPT | Performed by: NURSE PRACTITIONER

## 2021-10-12 ENCOUNTER — TELEPHONE (OUTPATIENT)
Dept: INTERNAL MEDICINE CLINIC | Facility: CLINIC | Age: 56
End: 2021-10-12

## 2021-10-29 DIAGNOSIS — F51.01 PRIMARY INSOMNIA: ICD-10-CM

## 2021-10-29 DIAGNOSIS — G25.81 RESTLESS LEG SYNDROME: ICD-10-CM

## 2021-10-29 DIAGNOSIS — F41.9 ANXIETY: ICD-10-CM

## 2021-11-01 RX ORDER — GABAPENTIN 300 MG/1
600 CAPSULE ORAL
Qty: 180 CAPSULE | Refills: 0 | Status: SHIPPED | OUTPATIENT
Start: 2021-11-01 | End: 2022-05-03 | Stop reason: SDUPTHER

## 2021-11-01 RX ORDER — BUSPIRONE HYDROCHLORIDE 5 MG/1
5 TABLET ORAL DAILY PRN
Qty: 90 TABLET | Refills: 0 | Status: SHIPPED | OUTPATIENT
Start: 2021-11-01 | End: 2022-02-15

## 2021-11-01 RX ORDER — TEMAZEPAM 30 MG/1
30 CAPSULE ORAL
Qty: 30 CAPSULE | Refills: 0 | Status: SHIPPED | OUTPATIENT
Start: 2021-11-01 | End: 2021-12-15 | Stop reason: SDUPTHER

## 2021-12-15 DIAGNOSIS — IMO0002 UNCONTROLLED TYPE 2 DIABETES MELLITUS WITH COMPLICATION, WITHOUT LONG-TERM CURRENT USE OF INSULIN: ICD-10-CM

## 2021-12-15 DIAGNOSIS — F51.01 PRIMARY INSOMNIA: ICD-10-CM

## 2021-12-15 DIAGNOSIS — E78.5 HYPERLIPIDEMIA, UNSPECIFIED HYPERLIPIDEMIA TYPE: ICD-10-CM

## 2021-12-16 RX ORDER — TEMAZEPAM 30 MG/1
30 CAPSULE ORAL
Qty: 30 CAPSULE | Refills: 0 | Status: SHIPPED | OUTPATIENT
Start: 2021-12-16 | End: 2022-03-21 | Stop reason: SDUPTHER

## 2021-12-16 RX ORDER — ROSUVASTATIN CALCIUM 5 MG/1
5 TABLET, COATED ORAL DAILY
Qty: 90 TABLET | Refills: 3 | Status: SHIPPED | OUTPATIENT
Start: 2021-12-16

## 2022-02-01 DIAGNOSIS — I10 ESSENTIAL HYPERTENSION: ICD-10-CM

## 2022-02-01 RX ORDER — LISINOPRIL 40 MG/1
TABLET ORAL
Qty: 90 TABLET | Refills: 3 | Status: SHIPPED | OUTPATIENT
Start: 2022-02-01 | End: 2022-04-26 | Stop reason: SDUPTHER

## 2022-02-14 DIAGNOSIS — F41.9 ANXIETY: ICD-10-CM

## 2022-02-15 RX ORDER — BUSPIRONE HYDROCHLORIDE 5 MG/1
5 TABLET ORAL DAILY PRN
Qty: 90 TABLET | Refills: 0 | Status: SHIPPED | OUTPATIENT
Start: 2022-02-15 | End: 2022-05-22

## 2022-03-15 DIAGNOSIS — E11.9 TYPE 2 DIABETES MELLITUS WITHOUT COMPLICATION, WITHOUT LONG-TERM CURRENT USE OF INSULIN (HCC): ICD-10-CM

## 2022-03-15 RX ORDER — GLIMEPIRIDE 2 MG/1
TABLET ORAL
Qty: 90 TABLET | Refills: 1 | Status: SHIPPED | OUTPATIENT
Start: 2022-03-15 | End: 2022-04-06

## 2022-03-21 DIAGNOSIS — F51.01 PRIMARY INSOMNIA: ICD-10-CM

## 2022-03-22 RX ORDER — TEMAZEPAM 30 MG/1
30 CAPSULE ORAL
Qty: 30 CAPSULE | Refills: 0 | Status: SHIPPED | OUTPATIENT
Start: 2022-03-22 | End: 2022-05-03 | Stop reason: SDUPTHER

## 2022-04-06 ENCOUNTER — OFFICE VISIT (OUTPATIENT)
Dept: INTERNAL MEDICINE CLINIC | Facility: CLINIC | Age: 57
End: 2022-04-06
Payer: COMMERCIAL

## 2022-04-06 VITALS
RESPIRATION RATE: 16 BRPM | OXYGEN SATURATION: 98 % | HEIGHT: 74 IN | DIASTOLIC BLOOD PRESSURE: 89 MMHG | HEART RATE: 80 BPM | BODY MASS INDEX: 35.5 KG/M2 | SYSTOLIC BLOOD PRESSURE: 138 MMHG | WEIGHT: 276.6 LBS

## 2022-04-06 DIAGNOSIS — B35.3 TINEA PEDIS OF RIGHT FOOT: Primary | ICD-10-CM

## 2022-04-06 DIAGNOSIS — I10 PRIMARY HYPERTENSION: ICD-10-CM

## 2022-04-06 DIAGNOSIS — Z12.11 SCREENING FOR COLORECTAL CANCER: ICD-10-CM

## 2022-04-06 DIAGNOSIS — F41.9 ANXIETY: ICD-10-CM

## 2022-04-06 DIAGNOSIS — Z11.59 NEED FOR HEPATITIS C SCREENING TEST: ICD-10-CM

## 2022-04-06 DIAGNOSIS — Z12.12 SCREENING FOR COLORECTAL CANCER: ICD-10-CM

## 2022-04-06 DIAGNOSIS — Z12.5 SCREENING PSA (PROSTATE SPECIFIC ANTIGEN): ICD-10-CM

## 2022-04-06 DIAGNOSIS — Z11.4 SCREENING FOR HIV (HUMAN IMMUNODEFICIENCY VIRUS): ICD-10-CM

## 2022-04-06 DIAGNOSIS — E11.9 TYPE 2 DIABETES MELLITUS WITHOUT COMPLICATION, WITHOUT LONG-TERM CURRENT USE OF INSULIN (HCC): ICD-10-CM

## 2022-04-06 DIAGNOSIS — E66.01 CLASS 2 SEVERE OBESITY DUE TO EXCESS CALORIES WITH SERIOUS COMORBIDITY AND BODY MASS INDEX (BMI) OF 36.0 TO 36.9 IN ADULT (HCC): ICD-10-CM

## 2022-04-06 PROCEDURE — 99214 OFFICE O/P EST MOD 30 MIN: CPT | Performed by: INTERNAL MEDICINE

## 2022-04-06 PROCEDURE — 3079F DIAST BP 80-89 MM HG: CPT | Performed by: INTERNAL MEDICINE

## 2022-04-06 PROCEDURE — 3008F BODY MASS INDEX DOCD: CPT | Performed by: INTERNAL MEDICINE

## 2022-04-06 PROCEDURE — 1036F TOBACCO NON-USER: CPT | Performed by: INTERNAL MEDICINE

## 2022-04-06 PROCEDURE — 3075F SYST BP GE 130 - 139MM HG: CPT | Performed by: INTERNAL MEDICINE

## 2022-04-06 RX ORDER — SEMAGLUTIDE 1.34 MG/ML
0.5 INJECTION, SOLUTION SUBCUTANEOUS WEEKLY
Qty: 1.5 ML | Refills: 3 | Status: SHIPPED | OUTPATIENT
Start: 2022-04-06 | End: 2022-04-26 | Stop reason: SDUPTHER

## 2022-04-06 NOTE — PROGRESS NOTES
Assessment/Plan:    Type 2 diabetes mellitus without complication, without long-term current use of insulin ([facility])    Lab Results   Component Value Date    HGBA1C 5 8 (H) 10/06/2021   I have counselled the pt to follow a healthy and balanced diet ,and recommend routine exercise  Patient would like to go back on Ozempic he will discontinue the Amaryl he reports me he tolerated Ozempic very well and this was very helpful with weight loss diabetes controlled patient is working on cutting out soda he is trying to follow healthy imbalance diet check labs  Recommend diabetic eye examination annual    Sleep apnea  Uses CPAP machine regularly and he is compliant    Hypertension  Hypertension - controlled, I have counseled patient following healthy balance diet, I would like the patient reduce sodium, exercise routinely, I would like the patient continued the med current medical regiment and we will continue to monitor  Tinea pedis  Rx for econazole apply to affected area daily times 10 days he the toes clean and dry    Anxiety  In regards to relationship with wife no SI no HI does not want counseling does not want marital counseling he reports me that lesions hip is improving he will be doing some traveling in the near future he reports me Restoril is very helpful with his sleep today did  the patient    Obesity  Obesity -I have counseled patient following healthy and balanced diet, I would like the patient to lose weight, I would like the patient exercise routinely; we will continue monitor the patient's progress  Problem List Items Addressed This Visit        Endocrine    Type 2 diabetes mellitus without complication, without long-term current use of insulin ([facility] )       Lab Results   Component Value Date    HGBA1C 5 8 (H) 10/06/2021   I have counselled the pt to follow a healthy and balanced diet ,and recommend routine exercise    Patient would like to go back on Ozempic he will discontinue the Amaryl he reports me he tolerated Ozempic very well and this was very helpful with weight loss diabetes controlled patient is working on cutting out soda he is trying to follow healthy imbalance diet check labs  Recommend diabetic eye examination annual         Relevant Medications    Semaglutide,0 25 or 0 5MG/DOS, (Ozempic, 0 25 or 0 5 MG/DOSE,) 2 MG/1 5ML SOPN    Other Relevant Orders    Ambulatory Referral to Ophthalmology    Comprehensive metabolic panel    Hemoglobin A1C    Lipid Panel with Direct LDL reflex    Microalbumin / creatinine urine ratio       Cardiovascular and Mediastinum    Hypertension     Hypertension - controlled, I have counseled patient following healthy balance diet, I would like the patient reduce sodium, exercise routinely, I would like the patient continued the med current medical regiment and we will continue to monitor  Musculoskeletal and Integument    Tinea pedis - Primary     Rx for econazole apply to affected area daily times 10 days he the toes clean and dry         Relevant Medications    econazole nitrate 1 % cream       Other    Anxiety     In regards to relationship with wife no SI no HI does not want counseling does not want marital counseling he reports me that lesions hip is improving he will be doing some traveling in the near future he reports me Restoril is very helpful with his sleep today did  the patient         Obesity     Obesity -I have counseled patient following healthy and balanced diet, I would like the patient to lose weight, I would like the patient exercise routinely; we will continue monitor the patient's progress             Other Visit Diagnoses     Need for hepatitis C screening test        Relevant Orders    Hepatitis C Antibody ([facility])    Screening for HIV (human immunodeficiency virus)        Screening for colorectal cancer        Relevant Orders    Ambulatory referral to Gastroenterology    Screening PSA (prostate specific antigen)        Relevant Orders    PSA, Total Screen          RTO in 4-6 months call if any problems  Subjective:      Patient ID: Jia Osuna is a 55 y o  male  HPI 48-year old male coming in for a follow up visit regarding type 2 diabetes, obesity, tinea pedis, hypertension, anxiety and obesity; The patient reports me compliant taking medications without untoward side effects the  The patient is here to review his medical condition, update me on the medical condition and the patient reports me no hospitalizations and no ER visits  No injuries no illnesses reports me insomnia improved with Restoril reports me ongoing relationship problem with his wife she has had an affair he reports me; he reports me they are getting along but no intimacy this leads to anxiety no SI no HI work is going well patient reports me that he will be traveling in the near future with his wife and family  No depression wife will not agree to medical   No si no hi sleep med work great and buspar once a day  The following portions of the patient's history were reviewed and updated as appropriate: allergies, current medications, past family history, past medical history, past social history, past surgical history and problem list     Review of Systems      Objective:    No follow-ups on file  No results found  Allergies   Allergen Reactions    Pollen Extract        Past Medical History:   Diagnosis Date    Anxiety     Depression     Diabetes mellitus ([address] )     Disease of thyroid gland     hypothyroid graves disease treated with radiation    Hyperlipidemia     Hypertension     Hyperthyroidism     L A  6/8/14    R    9/10/14      Irregular heart beat     Obesity     Sleep apnea     uses C-Pap machine    Venous insufficiency     L A 10/14/16   R    1/20/17       Past Surgical History:   Procedure Laterality Date    CHOLECYSTECTOMY      GALLBLADDER SURGERY      Anastomosis    PA COLONOSCOPY FLX DX W/CHEIKH SPEC WHEN PFRMD N/A 7/19/2017    Procedure: COLONOSCOPY;  Surgeon: Rodrigo Mcallister MD;  [facility]: [facility]; Service: Gastroenterology    UMBILICAL HERNIA REPAIR      VASCULAR SURGERY      VASECTOMY      vas deferens      Current Outpatient Medications on File Prior to Visit   Medication Sig Dispense Refill    busPIRone (BUSPAR) 5 mg tablet TAKE 1 TABLET (5 MG TOTAL) BY MOUTH DAILY AS NEEDED (ANXIETY) 90 tablet 0    gabapentin (NEURONTIN) 300 mg capsule Take 2 capsules (600 mg total) by mouth daily at bedtime 180 capsule 0    Lancets (OneTouch Delica Plus [id number]) MISC 1 each by Does not apply route daily 100 each 1    levothyroxine 137 mcg tablet Take 1 tablet (137 mcg total) by mouth daily 90 tablet 1    lisinopril (ZESTRIL) 40 mg tablet TAKE 1 TABLET BY MOUTH EVERY DAY 90 tablet 3    metFORMIN (GLUCOPHAGE) 1000 MG tablet Take 1 tablet (1,000 mg total) by mouth 2 (two) times a day with meals 180 tablet 3    OneTouch Verio test strip USE AS INSTRUCTED 100 each 1    rosuvastatin (CRESTOR) 5 mg tablet Take 1 tablet (5 mg total) by mouth daily 90 tablet 3    temazepam (RESTORIL) 30 mg capsule Take 1 capsule (30 mg total) by mouth daily at bedtime as needed for sleep 30 capsule 0    rizatriptan (MAXALT) 10 MG tablet Take 1 tablet (10 mg total) by mouth once as needed for migraine May repeat in 2 hours if needed (Patient not taking: Reported on 10/5/2021) 27 tablet 1     No current facility-administered medications on file prior to visit       Family History   Problem Relation Age of Onset    Lung cancer Mother     Prostate cancer Family      Social History     Socioeconomic History    Marital status: /Civil Union     Spouse name: Not on file    Number of children: Not on file    Years of education: Not on file    Highest education level: Not on file   Occupational History     Comment: job physical requirements heavy lifting   Tobacco Use    Smoking status: Never Smoker    Smokeless tobacco: Never Used   Vaping Use    Vaping Use: Never used   Substance and Sexual Activity    Alcohol use:  Yes     Alcohol/week: 1 0 standard drink     Types: 1 Cans of beer per week     Comment: rare / denied social drinker per [employer]    Drug use: No    Sexual activity: Yes     Partners: Female     Birth control/protection: Male Sterilization   Other Topics Concern    Not on file   Social History Narrative    Caffeine use    Work related stress     Social Determinants of Health     Financial Resource Strain: Not on file   Food Insecurity: Not on file   Transportation Needs: Not on file   Physical Activity: Not on file   Stress: Not on file   Social Connections: Not on file   Intimate Partner Violence: Not on file   Housing Stability: Not on file     Vitals:    04/06/22 1817   BP: 138/89   Pulse: 80   Resp: 16   SpO2: 98%   Weight: 125 kg (276 lb 9 6 oz)   Height: 6' 2" (1 88 m)     Results for orders placed or performed in visit on 10/06/21   Lipid panel   Result Value Ref Range    Total Cholesterol 126 <200 mg/dL    HDL 29 (L) > OR = 40 mg/dL    Triglycerides 114 <150 mg/dL    LDL Calculated 77 mg/dL (calc)    Chol HDLC Ratio 4 3 <5 0 (calc)    Non-HDL Cholesterol 97 <130 mg/dL (calc)   Comprehensive metabolic panel   Result Value Ref Range    Glucose, Random 102 (H) 65 - 99 mg/dL    BUN 13 7 - 25 mg/dL    Creatinine 1 13 0 70 - 1 33 mg/dL    eGFR Non  73 > OR = 60 mL/min/1 73m2    eGFR  84 > OR = 60 mL/min/1 73m2    SL AMB BUN/CREATININE RATIO NOT APPLICABLE 6 - 22 (calc)    Sodium 139 135 - 146 mmol/L    Potassium 3 9 3 5 - 5 3 mmol/L    Chloride 103 98 - 110 mmol/L    CO2 30 20 - 32 mmol/L    Calcium 9 4 8 6 - 10 3 mg/dL    Protein, Total 6 7 6 1 - 8 1 g/dL    Albumin 4 5 3 6 - 5 1 g/dL    Globulin 2 2 1 9 - 3 7 g/dL (calc)    Albumin/Globulin Ratio 2 0 1 0 - 2 5 (calc)    TOTAL BILIRUBIN 0 7 0 2 - 1 2 mg/dL    Alkaline Phosphatase 72 35 - 144 U/L    AST 16 10 - 35 U/L ALT 22 9 - 46 U/L   Hemoglobin A1C With EAG   Result Value Ref Range    Hemoglobin A1C 5 8 (H) <5 7 % of total Hgb    Estimated Average Glucose 120 (calc)    Estimated Average Glucose (mmol/L) 6 6 (calc)     Weight (last 2 days)     Date/Time Weight    04/06/22 1817 125 (276 6)        Body mass index is 35 51 kg/m²  BP      Temp      Pulse     Resp      SpO2        Vitals:    04/06/22 1817   Weight: 125 kg (276 lb 9 6 oz)     Vitals:    04/06/22 1817   Weight: 125 kg (276 lb 9 6 oz)       /89   Pulse 80   Resp 16   Ht 6' 2" (1 88 m)   Wt 125 kg (276 lb 9 6 oz)   SpO2 98%   BMI 35 51 kg/m²          Physical Exam  Constitutional:       General: He is not in acute distress  Appearance: He is well-developed  He is not diaphoretic  HENT:      Head: Normocephalic and atraumatic  Right Ear: External ear normal       Left Ear: External ear normal    Eyes:      General: No scleral icterus  Right eye: No discharge  Left eye: No discharge  Conjunctiva/sclera: Conjunctivae normal       Pupils: Pupils are equal, round, and reactive to light  Cardiovascular:      Rate and Rhythm: Normal rate and regular rhythm  Pulses: no weak pulses          Dorsalis pedis pulses are 2+ on the right side  Posterior tibial pulses are 2+ on the right side and 2+ on the left side  Heart sounds: Normal heart sounds  No murmur heard  No friction rub  No gallop  Pulmonary:      Effort: No respiratory distress  Breath sounds: No wheezing or rales  Abdominal:      General: Bowel sounds are normal  There is no distension  Palpations: Abdomen is soft  There is no mass  Tenderness: There is no abdominal tenderness  There is no guarding or rebound  Musculoskeletal:         General: No deformity  Cervical back: Neck supple  Feet:      Right foot:      Skin integrity: No ulcer, skin breakdown, erythema, warmth, callus or dry skin        Left foot:      Skin integrity: No ulcer, skin breakdown, erythema, warmth, callus or dry skin  Lymphadenopathy:      Cervical: No cervical adenopathy  Neurological:      Mental Status: He is alert  Patient's shoes and socks removed  Right Foot/Ankle   Right Foot Inspection  Skin Exam: skin normal and skin intact  No dry skin, no warmth, no callus, no erythema, no maceration, no abnormal color, no pre-ulcer, no ulcer and no callus  Toe Exam: ROM and strength within normal limits  Sensory   Monofilament testing: intact    Vascular  Capillary refills: < 3 seconds  The right DP pulse is 2+  The right PT pulse is 2+  Left Foot/Ankle  Left Foot Inspection  Skin Exam: skin normal and skin intact  No dry skin, no warmth, no erythema, no maceration, normal color, no pre-ulcer, no ulcer and no callus  Toe Exam: ROM and strength within normal limits  Sensory   Monofilament testing: intact    Vascular  Capillary refills: < 3 seconds  The left PT pulse is 2+       Assign Risk Category  No deformity present  No loss of protective sensation  No weak pulses  Risk: 0  Minimal tinea pedis interdigital

## 2022-04-07 NOTE — ASSESSMENT & PLAN NOTE
Lab Results   Component Value Date    HGBA1C 5 8 (H) 10/06/2021   I have counselled the pt to follow a healthy and balanced diet ,and recommend routine exercise  Patient would like to go back on Ozempic he will discontinue the Amaryl he reports me he tolerated Ozempic very well and this was very helpful with weight loss diabetes controlled patient is working on cutting out soda he is trying to follow healthy imbalance diet check labs    Recommend diabetic eye examination annual

## 2022-04-07 NOTE — ASSESSMENT & PLAN NOTE
In regards to relationship with wife no SI no HI does not want counseling does not want marital counseling he reports me that lesions hip is improving he will be doing some traveling in the near future he reports me Restoril is very helpful with his sleep today did  the patient

## 2022-04-26 DIAGNOSIS — E11.9 TYPE 2 DIABETES MELLITUS WITHOUT COMPLICATION, WITHOUT LONG-TERM CURRENT USE OF INSULIN (HCC): ICD-10-CM

## 2022-04-26 DIAGNOSIS — I10 ESSENTIAL HYPERTENSION: ICD-10-CM

## 2022-04-26 PROCEDURE — 4010F ACE/ARB THERAPY RXD/TAKEN: CPT | Performed by: INTERNAL MEDICINE

## 2022-04-26 RX ORDER — LISINOPRIL 40 MG/1
40 TABLET ORAL DAILY
Qty: 90 TABLET | Refills: 2 | Status: SHIPPED | OUTPATIENT
Start: 2022-04-26

## 2022-04-26 RX ORDER — SEMAGLUTIDE 1.34 MG/ML
0.5 INJECTION, SOLUTION SUBCUTANEOUS WEEKLY
Qty: 1.5 ML | Refills: 5 | Status: SHIPPED | OUTPATIENT
Start: 2022-04-26 | End: 2022-08-08 | Stop reason: SDUPTHER

## 2022-05-03 DIAGNOSIS — G25.81 RESTLESS LEG SYNDROME: ICD-10-CM

## 2022-05-03 DIAGNOSIS — F51.01 PRIMARY INSOMNIA: ICD-10-CM

## 2022-05-03 LAB
ALBUMIN SERPL-MCNC: 4.7 G/DL (ref 3.6–5.1)
ALBUMIN/GLOB SERPL: 2.1 (CALC) (ref 1–2.5)
ALP SERPL-CCNC: 75 U/L (ref 35–144)
ALT SERPL-CCNC: 17 U/L (ref 9–46)
AST SERPL-CCNC: 15 U/L (ref 10–35)
BILIRUB SERPL-MCNC: 0.7 MG/DL (ref 0.2–1.2)
BUN SERPL-MCNC: 19 MG/DL (ref 7–25)
BUN/CREAT SERPL: NORMAL (CALC) (ref 6–22)
CALCIUM SERPL-MCNC: 9.2 MG/DL (ref 8.6–10.3)
CHLORIDE SERPL-SCNC: 101 MMOL/L (ref 98–110)
CHOLEST SERPL-MCNC: 128 MG/DL
CHOLEST/HDLC SERPL: 4.1 (CALC)
CO2 SERPL-SCNC: 29 MMOL/L (ref 20–32)
CREAT SERPL-MCNC: 1.16 MG/DL (ref 0.7–1.33)
GLOBULIN SER CALC-MCNC: 2.2 G/DL (CALC) (ref 1.9–3.7)
GLUCOSE SERPL-MCNC: 91 MG/DL (ref 65–99)
HBA1C MFR BLD: 5.8 % OF TOTAL HGB
HCV AB S/CO SERPL IA: 0.03
HCV AB SERPL QL IA: NORMAL
HDLC SERPL-MCNC: 31 MG/DL
LDLC SERPL CALC-MCNC: 80 MG/DL (CALC)
NONHDLC SERPL-MCNC: 97 MG/DL (CALC)
POTASSIUM SERPL-SCNC: 3.8 MMOL/L (ref 3.5–5.3)
PROT SERPL-MCNC: 6.9 G/DL (ref 6.1–8.1)
PSA SERPL-MCNC: 1.76 NG/ML
SL AMB EGFR AFRICAN AMERICAN: 82 ML/MIN/1.73M2
SL AMB EGFR NON AFRICAN AMERICAN: 70 ML/MIN/1.73M2
SODIUM SERPL-SCNC: 139 MMOL/L (ref 135–146)
TRIGL SERPL-MCNC: 85 MG/DL

## 2022-05-03 PROCEDURE — 3044F HG A1C LEVEL LT 7.0%: CPT | Performed by: INTERNAL MEDICINE

## 2022-05-04 RX ORDER — GABAPENTIN 300 MG/1
600 CAPSULE ORAL
Qty: 180 CAPSULE | Refills: 0 | Status: SHIPPED | OUTPATIENT
Start: 2022-05-04 | End: 2022-08-03

## 2022-05-04 RX ORDER — TEMAZEPAM 30 MG/1
30 CAPSULE ORAL
Qty: 30 CAPSULE | Refills: 0 | Status: SHIPPED | OUTPATIENT
Start: 2022-05-04 | End: 2022-06-23 | Stop reason: SDUPTHER

## 2022-05-21 DIAGNOSIS — F41.9 ANXIETY: ICD-10-CM

## 2022-05-22 RX ORDER — BUSPIRONE HYDROCHLORIDE 5 MG/1
5 TABLET ORAL DAILY PRN
Qty: 90 TABLET | Refills: 0 | Status: SHIPPED | OUTPATIENT
Start: 2022-05-22

## 2022-06-23 DIAGNOSIS — F51.01 PRIMARY INSOMNIA: ICD-10-CM

## 2022-06-24 DIAGNOSIS — E03.9 HYPOTHYROIDISM, UNSPECIFIED TYPE: ICD-10-CM

## 2022-06-24 RX ORDER — TEMAZEPAM 30 MG/1
30 CAPSULE ORAL
Qty: 30 CAPSULE | Refills: 0 | Status: SHIPPED | OUTPATIENT
Start: 2022-06-24

## 2022-06-27 RX ORDER — LEVOTHYROXINE SODIUM 137 UG/1
TABLET ORAL
Qty: 30 TABLET | Refills: 0 | Status: SHIPPED | OUTPATIENT
Start: 2022-06-27 | End: 2022-07-25

## 2022-07-22 ENCOUNTER — CLINICAL SUPPORT (OUTPATIENT)
Dept: INTERNAL MEDICINE CLINIC | Facility: CLINIC | Age: 57
End: 2022-07-22

## 2022-07-22 VITALS
SYSTOLIC BLOOD PRESSURE: 130 MMHG | BODY MASS INDEX: 31.44 KG/M2 | HEART RATE: 86 BPM | WEIGHT: 237.2 LBS | OXYGEN SATURATION: 98 % | DIASTOLIC BLOOD PRESSURE: 86 MMHG | HEIGHT: 73 IN

## 2022-07-22 DIAGNOSIS — Z00.8 ENCOUNTER FOR BIOMETRIC SCREENING: Primary | ICD-10-CM

## 2022-07-22 NOTE — PROGRESS NOTES
Pt came in for form to be completed for biometric screening  He had a recent exam in April  Pt weight taken as he lost weight due to Ozempic  Form given to Gan[place] Dr Romayne Hatchet MA for completion     [facility]

## 2022-07-25 DIAGNOSIS — E03.9 HYPOTHYROIDISM, UNSPECIFIED TYPE: ICD-10-CM

## 2022-07-25 RX ORDER — LEVOTHYROXINE SODIUM 137 UG/1
TABLET ORAL
Qty: 30 TABLET | Refills: 0 | Status: SHIPPED | OUTPATIENT
Start: 2022-07-25 | End: 2022-08-23

## 2022-08-03 DIAGNOSIS — G25.81 RESTLESS LEG SYNDROME: ICD-10-CM

## 2022-08-03 RX ORDER — GABAPENTIN 300 MG/1
600 CAPSULE ORAL
Qty: 180 CAPSULE | Refills: 0 | Status: SHIPPED | OUTPATIENT
Start: 2022-08-03

## 2022-08-08 DIAGNOSIS — E11.9 TYPE 2 DIABETES MELLITUS WITHOUT COMPLICATION, WITHOUT LONG-TERM CURRENT USE OF INSULIN (HCC): ICD-10-CM

## 2022-08-08 RX ORDER — SEMAGLUTIDE 1.34 MG/ML
0.5 INJECTION, SOLUTION SUBCUTANEOUS WEEKLY
Qty: 1.5 ML | Refills: 0 | Status: SHIPPED | OUTPATIENT
Start: 2022-08-08 | End: 2022-08-09

## 2022-08-09 DIAGNOSIS — E11.9 TYPE 2 DIABETES MELLITUS WITHOUT COMPLICATION, WITHOUT LONG-TERM CURRENT USE OF INSULIN (HCC): ICD-10-CM

## 2022-08-09 RX ORDER — SEMAGLUTIDE 1.34 MG/ML
0.5 INJECTION, SOLUTION SUBCUTANEOUS WEEKLY
Qty: 1.5 ML | Refills: 0 | Status: SHIPPED | OUTPATIENT
Start: 2022-08-09 | End: 2022-08-24 | Stop reason: SDUPTHER

## 2022-08-09 RX ORDER — SEMAGLUTIDE 1.34 MG/ML
0.5 INJECTION, SOLUTION SUBCUTANEOUS WEEKLY
Qty: 1.5 ML | Refills: 0 | Status: SHIPPED | OUTPATIENT
Start: 2022-08-09 | End: 2022-08-09

## 2022-08-10 DIAGNOSIS — F51.01 PRIMARY INSOMNIA: ICD-10-CM

## 2022-08-11 RX ORDER — TEMAZEPAM 30 MG/1
30 CAPSULE ORAL
Qty: 30 CAPSULE | Refills: 0 | Status: SHIPPED | OUTPATIENT
Start: 2022-08-11 | End: 2022-09-23 | Stop reason: SDUPTHER

## 2022-08-23 DIAGNOSIS — F41.9 ANXIETY: ICD-10-CM

## 2022-08-23 RX ORDER — BUSPIRONE HYDROCHLORIDE 5 MG/1
5 TABLET ORAL DAILY PRN
Qty: 90 TABLET | Refills: 0 | Status: SHIPPED | OUTPATIENT
Start: 2022-08-23

## 2022-08-24 ENCOUNTER — OFFICE VISIT (OUTPATIENT)
Dept: INTERNAL MEDICINE CLINIC | Facility: CLINIC | Age: 57
End: 2022-08-24
Payer: COMMERCIAL

## 2022-08-24 VITALS
SYSTOLIC BLOOD PRESSURE: 139 MMHG | BODY MASS INDEX: 31.28 KG/M2 | HEART RATE: 78 BPM | OXYGEN SATURATION: 97 % | HEIGHT: 73 IN | WEIGHT: 236 LBS | DIASTOLIC BLOOD PRESSURE: 82 MMHG

## 2022-08-24 DIAGNOSIS — E11.9 TYPE 2 DIABETES MELLITUS WITHOUT COMPLICATION, WITHOUT LONG-TERM CURRENT USE OF INSULIN (HCC): ICD-10-CM

## 2022-08-24 DIAGNOSIS — E78.5 HYPERLIPIDEMIA, UNSPECIFIED HYPERLIPIDEMIA TYPE: ICD-10-CM

## 2022-08-24 DIAGNOSIS — E03.9 HYPOTHYROIDISM, UNSPECIFIED TYPE: ICD-10-CM

## 2022-08-24 DIAGNOSIS — Z23 NEED FOR SHINGLES VACCINE: Primary | ICD-10-CM

## 2022-08-24 DIAGNOSIS — E11.65 TYPE 2 DIABETES MELLITUS WITH HYPERGLYCEMIA, UNSPECIFIED WHETHER LONG TERM INSULIN USE (HCC): ICD-10-CM

## 2022-08-24 DIAGNOSIS — Z23 ENCOUNTER FOR IMMUNIZATION: ICD-10-CM

## 2022-08-24 DIAGNOSIS — K63.5 POLYP OF COLON, UNSPECIFIED PART OF COLON, UNSPECIFIED TYPE: ICD-10-CM

## 2022-08-24 DIAGNOSIS — E66.01 CLASS 2 SEVERE OBESITY DUE TO EXCESS CALORIES WITH SERIOUS COMORBIDITY AND BODY MASS INDEX (BMI) OF 36.0 TO 36.9 IN ADULT (HCC): ICD-10-CM

## 2022-08-24 DIAGNOSIS — F51.01 PRIMARY INSOMNIA: ICD-10-CM

## 2022-08-24 LAB
LEFT EYE DIABETIC RETINOPATHY: NORMAL
LEFT EYE IMAGE QUALITY: NORMAL
LEFT EYE MACULAR EDEMA: NORMAL
LEFT EYE OTHER RETINOPATHY: NORMAL
RIGHT EYE DIABETIC RETINOPATHY: NORMAL
RIGHT EYE IMAGE QUALITY: NORMAL
RIGHT EYE MACULAR EDEMA: NORMAL
RIGHT EYE OTHER RETINOPATHY: NORMAL
SEVERITY (EYE EXAM): NORMAL

## 2022-08-24 PROCEDURE — 99214 OFFICE O/P EST MOD 30 MIN: CPT | Performed by: INTERNAL MEDICINE

## 2022-08-24 PROCEDURE — 92250 FUNDUS PHOTOGRAPHY W/I&R: CPT | Performed by: INTERNAL MEDICINE

## 2022-08-24 PROCEDURE — 3075F SYST BP GE 130 - 139MM HG: CPT | Performed by: INTERNAL MEDICINE

## 2022-08-24 PROCEDURE — 3079F DIAST BP 80-89 MM HG: CPT | Performed by: INTERNAL MEDICINE

## 2022-08-24 RX ORDER — LEVOTHYROXINE SODIUM 137 UG/1
137 TABLET ORAL DAILY
Qty: 90 TABLET | Refills: 3
Start: 2022-08-24 | End: 2022-09-25

## 2022-08-24 RX ORDER — SEMAGLUTIDE 1.34 MG/ML
0.5 INJECTION, SOLUTION SUBCUTANEOUS WEEKLY
Qty: 3 ML | Refills: 0
Start: 2022-08-24

## 2022-08-24 NOTE — PROGRESS NOTES
Assessment/Plan:    Hypothyroidism  Hypothyroidism controlled the patient is currently euthyroid I will be ordering a TSH prior to the next office visit and the patient will continue with current medical regiment; we will continue to monitor the patient's progress  Type 2 diabetes mellitus without complication, without long-term current use of insulin ([facility])    Lab Results   Component Value Date    HGBA1C 5 8 (H) 05/03/2022   I have counselled the pt to follow a healthy and balanced diet ,and recommend routine exercise  I will be ordering diabetic laboratories including comprehensive metabolic panel, hemoglobin A1c, urine microalbumin, lipid panel  Hyperlipidemia  Hyperlipidemia controlled continue with current medical regiment recommend a low-cholesterol diet and recommend routine exercise we will continue to monitor the progress  Continue Crestor 5 mg once daily    Obesity  Obesity -I have counseled patient following healthy and balanced diet, I would like the patient to lose weight, I would like the patient exercise routinely; we will continue monitor the patient's progress  Primary insomnia  Clinically stable and doing well continue the current medical regiment will continue monitor  Continue Restoril 30 mg 1 p o  q h s  p r n  Problem List Items Addressed This Visit        Endocrine    Type 2 diabetes mellitus without complication, without long-term current use of insulin ([facility] [address] )       Lab Results   Component Value Date    HGBA1C 5 8 (H) 05/03/2022   I have counselled the pt to follow a healthy and balanced diet ,and recommend routine exercise  I will be ordering diabetic laboratories including comprehensive metabolic panel, hemoglobin A1c, urine microalbumin, lipid panel           Relevant Medications    Semaglutide,0 25 or 0 5MG/DOS, (Ozempic, 0 25 or 0 5 MG/DOSE,) 2 MG/1 5ML SOPN    Other Relevant Orders    IRIS Diabetic eye exam    Comprehensive metabolic panel    Hemoglobin A1C    Lipid Panel with Direct LDL reflex    Microalbumin / creatinine urine ratio    Hypothyroidism     Hypothyroidism controlled the patient is currently euthyroid I will be ordering a TSH prior to the next office visit and the patient will continue with current medical regiment; we will continue to monitor the patient's progress  Relevant Medications    levothyroxine 137 mcg tablet    Type 2 diabetes mellitus with hyperglycemia, unspecified whether long term insulin use (HCC)    Relevant Medications    Semaglutide,0 25 or 0 5MG/DOS, (Ozempic, 0 25 or 0 5 MG/DOSE,) 2 MG/1 5ML SOPN       Other    Hyperlipidemia     Hyperlipidemia controlled continue with current medical regiment recommend a low-cholesterol diet and recommend routine exercise we will continue to monitor the progress  Continue Crestor 5 mg once daily         Obesity     Obesity -I have counseled patient following healthy and balanced diet, I would like the patient to lose weight, I would like the patient exercise routinely; we will continue monitor the patient's progress  Primary insomnia     Clinically stable and doing well continue the current medical regiment will continue monitor  Continue Restoril 30 mg 1 p o  q h s  p r n  Other Visit Diagnoses     Need for shingles vaccine    -  Primary    Relevant Orders    Varicella zoster antibody, IgG    Encounter for immunization        Polyp of colon, unspecified part of colon, unspecified type        Relevant Orders    Ambulatory referral to Gastroenterology          Return to office 6  months  call if any problems  Subjective:      Patient ID: Darryle Class is a 65 y o  male  HPI 64-year old male coming in for a follow up visit regarding type 2 diabetes, hypothyroidism, obesity, hyperlipidemia, primary insomnia; The patient reports me compliant taking medications without untoward side effects the    The patient is here to review his medical condition, update me on the medical condition and the patient reports me no hospitalizations and no ER visits  Patient reports me cutting down on his soda reports me trying to work on his diet reports me losing weight with the semiglutide tolerating well  No injuries no illnesses here to review laboratories reports me needs the Restoril to fall sleep without this medication he is not sleeping well  He reports me he is doing emotionally well currently the situation at home is stable    The following portions of the patient's history were reviewed and updated as appropriate: allergies, current medications, past family history, past medical history, past social history, past surgical history and problem list     Review of Systems   Constitutional: Negative for activity change, appetite change and unexpected weight change  HENT: Negative for congestion and postnasal drip  Eyes: Negative for visual disturbance  Respiratory: Negative for cough and shortness of breath  Cardiovascular: Negative for chest pain  Gastrointestinal: Negative for abdominal pain, diarrhea, nausea and vomiting  Neurological: Negative for dizziness, light-headedness and headaches  Objective:    Return in about 6 months (around 2/24/2023)  No results found  Allergies   Allergen Reactions    Pollen Extract        Past Medical History:   Diagnosis Date    Anxiety     Depression     Diabetes mellitus ([facility] [address] )     Disease of thyroid gland     hypothyroid graves disease treated with radiation    Hyperlipidemia     Hypertension     Hyperthyroidism     L A  6/8/14    R    9/10/14      Irregular heart beat     Obesity     Sleep apnea     uses C-Pap machine    Venous insufficiency     L A 10/14/16   R    1/20/17       Past Surgical History:   Procedure Laterality Date    CHOLECYSTECTOMY      GALLBLADDER SURGERY      Anastomosis    MD COLONOSCOPY FLX DX W/COLLJ SPEC WHEN PFRMD N/A 7/19/2017    Procedure: COLONOSCOPY;  Surgeon: Donna Solomon, MD;  [facility]; Service: Gastroenterology    UMBILICAL HERNIA REPAIR      VASCULAR SURGERY      VASECTOMY      vas deferens      Current Outpatient Medications on File Prior to Visit   Medication Sig Dispense Refill    busPIRone (BUSPAR) 5 mg tablet TAKE 1 TABLET (5 MG TOTAL) BY MOUTH DAILY AS NEEDED (ANXIETY) 90 tablet 0    gabapentin (NEURONTIN) 300 mg capsule TAKE 2 CAPSULES (600 MG TOTAL) BY MOUTH DAILY AT BEDTIME 180 capsule 0    Lancets (OneTouch Delica Plus [id number]) MISC 1 each by Does not apply route daily 100 each 1    lisinopril (ZESTRIL) 40 mg tablet Take 1 tablet (40 mg total) by mouth daily 90 tablet 2    metFORMIN (GLUCOPHAGE) 1000 MG tablet Take 1 tablet (1,000 mg total) by mouth 2 (two) times a day with meals 180 tablet 3    OneTouch Verio test strip USE AS INSTRUCTED 100 each 1    rosuvastatin (CRESTOR) 5 mg tablet Take 1 tablet (5 mg total) by mouth daily 90 tablet 3    temazepam (RESTORIL) 30 mg capsule Take 1 capsule (30 mg total) by mouth daily at bedtime as needed for sleep 30 capsule 0    econazole nitrate 1 % cream Apply topically daily for 10 days (Patient not taking: Reported on 8/24/2022) 15 g 1    rizatriptan (MAXALT) 10 MG tablet Take 1 tablet (10 mg total) by mouth once as needed for migraine May repeat in 2 hours if needed (Patient not taking: No sig reported) 27 tablet 1     No current facility-administered medications on file prior to visit       Family History   Problem Relation Age of Onset    Lung cancer Mother     Prostate cancer Family      Social History     Socioeconomic History    Marital status: /Civil Union     Spouse name: Not on file    Number of children: Not on file    Years of education: Not on file    Highest education level: Not on file   Occupational History     Comment: job physical requirements heavy lifting   Tobacco Use    Smoking status: Never Smoker    Smokeless tobacco: Never Used   Vaping Use    Vaping Use: Never used Substance and Sexual Activity    Alcohol use: Not Currently     Comment: 1 can per month    Drug use: No    Sexual activity: Yes     Partners: Female     Birth control/protection: Male Sterilization   Other Topics Concern    Not on file   Social History Narrative    Caffeine use    Work related stress     Social Determinants of Health     Financial Resource Strain: Not on file   Food Insecurity: Not on file   Transportation Needs: Not on file   Physical Activity: Not on file   Stress: Not on file   Social Connections: Not on file   Intimate Partner Violence: Not on file   Housing Stability: Not on file     Vitals:    08/24/22 1804   BP: 139/82   Pulse: 78   SpO2: 97%   Weight: 107 kg (236 lb)   Height: 6' 1" (1 854 m)     Results for orders placed or performed in visit on 05/03/22   Lipid Panel with Direct LDL reflex   Result Value Ref Range    Total Cholesterol 128 <200 mg/dL    HDL 31 (L) > OR = 40 mg/dL    Triglycerides 85 <150 mg/dL    LDL Calculated 80 mg/dL (calc)    Chol HDLC Ratio 4 1 <5 0 (calc)    Non-HDL Cholesterol 97 <130 mg/dL (calc)   Comprehensive metabolic panel   Result Value Ref Range    Glucose, Random 91 65 - 99 mg/dL    BUN 19 7 - 25 mg/dL    Creatinine 1 16 0 70 - 1 33 mg/dL    eGFR Non  70 > OR = 60 mL/min/1 73m2    eGFR  82 > OR = 60 mL/min/1 73m2    SL AMB BUN/CREATININE RATIO NOT APPLICABLE 6 - 22 (calc)    Sodium 139 135 - 146 mmol/L    Potassium 3 8 3 5 - 5 3 mmol/L    Chloride 101 98 - 110 mmol/L    CO2 29 20 - 32 mmol/L    Calcium 9 2 8 6 - 10 3 mg/dL    Protein, Total 6 9 6 1 - 8 1 g/dL    Albumin 4 7 3 6 - 5 1 g/dL    Globulin 2 2 1 9 - 3 7 g/dL (calc)    Albumin/Globulin Ratio 2 1 1 0 - 2 5 (calc)    TOTAL BILIRUBIN 0 7 0 2 - 1 2 mg/dL    Alkaline Phosphatase 75 35 - 144 U/L    AST 15 10 - 35 U/L    ALT 17 9 - 46 U/L   Hepatitis C Ab W/Refl To HCV RNA, Qn, PCR   Result Value Ref Range    HEP C AB NON-REACTIVE NON-REACTIVE    Signal to Cut-Off 0 03 <1 00   PSA, Total Screen   Result Value Ref Range    Prostate Specific Antigen Total 1 76 < OR = 4 00 ng/mL   Hemoglobin A1c (w/out EAG)   Result Value Ref Range    Hemoglobin A1C 5 8 (H) <5 7 % of total Hgb     Weight (last 2 days)     None        Body mass index is 31 14 kg/m²  BP      Temp      Pulse     Resp      SpO2        Vitals:    08/24/22 1804   Weight: 107 kg (236 lb)     Vitals:    08/24/22 1804   Weight: 107 kg (236 lb)       /82   Pulse 78   Ht 6' 1" (1 854 m)   Wt 107 kg (236 lb)   SpO2 97%   BMI 31 14 kg/m²          Physical Exam  Constitutional:       General: He is not in acute distress  Appearance: He is well-developed  He is not diaphoretic  HENT:      Head: Normocephalic and atraumatic  Right Ear: External ear normal       Left Ear: External ear normal    Eyes:      General: No scleral icterus  Right eye: No discharge  Left eye: No discharge  Conjunctiva/sclera: Conjunctivae normal       Pupils: Pupils are equal, round, and reactive to light  Cardiovascular:      Rate and Rhythm: Normal rate and regular rhythm  Heart sounds: Normal heart sounds  No murmur heard  No friction rub  No gallop  Pulmonary:      Effort: No respiratory distress  Breath sounds: No wheezing or rales  Abdominal:      General: Bowel sounds are normal  There is no distension  Palpations: Abdomen is soft  There is no mass  Tenderness: There is no abdominal tenderness  There is no guarding or rebound  Musculoskeletal:         General: No deformity  Cervical back: Neck supple  Lymphadenopathy:      Cervical: No cervical adenopathy  Neurological:      Mental Status: He is alert  Psychiatric:         Mood and Affect: Mood is not anxious or depressed

## 2022-08-28 PROBLEM — E11.65 TYPE 2 DIABETES MELLITUS WITH HYPERGLYCEMIA, UNSPECIFIED WHETHER LONG TERM INSULIN USE (HCC): Status: ACTIVE | Noted: 2022-08-28

## 2022-08-28 NOTE — ASSESSMENT & PLAN NOTE
Clinically stable and doing well continue the current medical regiment will continue monitor  Continue Restoril 30 mg 1 p o  q h s  p r n

## 2022-08-28 NOTE — ASSESSMENT & PLAN NOTE
Lab Results   Component Value Date    HGBA1C 5 8 (H) 05/03/2022   I have counselled the pt to follow a healthy and balanced diet ,and recommend routine exercise  I will be ordering diabetic laboratories including comprehensive metabolic panel, hemoglobin A1c, urine microalbumin, lipid panel

## 2022-08-31 ENCOUNTER — TELEPHONE (OUTPATIENT)
Dept: GASTROENTEROLOGY | Facility: AMBULARY SURGERY CENTER | Age: 57
End: 2022-08-31

## 2022-08-31 NOTE — TELEPHONE ENCOUNTER
Patient is scheduled for colonoscopy on November 28 , 2022 at [facility]  with Cherrie Atkinson MD  Patient is aware of pre-procedure prep of Miralax/Dulcolax and they will be called the day prior between 2 and 6 pm for time to report for procedure  Pre-procedure prep has been given to the patient  via [id number] mail on August 31 , 2022

## 2022-09-14 DIAGNOSIS — Z11.4 SCREENING FOR HIV (HUMAN IMMUNODEFICIENCY VIRUS): ICD-10-CM

## 2022-09-14 DIAGNOSIS — E11.65 TYPE 2 DIABETES MELLITUS WITH HYPERGLYCEMIA, UNSPECIFIED WHETHER LONG TERM INSULIN USE (HCC): Primary | ICD-10-CM

## 2022-09-14 DIAGNOSIS — Z23 ENCOUNTER FOR IMMUNIZATION: ICD-10-CM

## 2022-09-22 ENCOUNTER — TELEPHONE (OUTPATIENT)
Dept: ADMINISTRATIVE | Facility: OTHER | Age: 57
End: 2022-09-22

## 2022-09-22 NOTE — TELEPHONE ENCOUNTER
----- Message from Zhao Rod sent at 9/21/2022  6:32 PM EDT -----  Regarding: diabetic eye exam    Hello, our patient attached above has had Diabetic Eye Exam completed/performed  Please assist in updating the patient chart by pulling the document from procedure Tab within Chart Review  The date of service is 8/24/22##       IRIS exam completed in our office    Thank you,  Alayna Trinh

## 2022-09-22 NOTE — TELEPHONE ENCOUNTER
Upon review of the In Basket request we found the  is already up to date with the requested item    Any additional questions or concerns should be emailed to the Practice Liaisons via [email or web address] email, please do not reply via In Basket      Thank you  Kj James

## 2022-09-23 DIAGNOSIS — B35.3 TINEA PEDIS OF RIGHT FOOT: ICD-10-CM

## 2022-09-23 DIAGNOSIS — F51.01 PRIMARY INSOMNIA: ICD-10-CM

## 2022-09-25 RX ORDER — TEMAZEPAM 30 MG/1
30 CAPSULE ORAL
Qty: 30 CAPSULE | Refills: 0 | Status: SHIPPED | OUTPATIENT
Start: 2022-09-25

## 2022-10-22 DIAGNOSIS — E03.9 HYPOTHYROIDISM, UNSPECIFIED TYPE: ICD-10-CM

## 2022-10-23 RX ORDER — LEVOTHYROXINE SODIUM 137 UG/1
TABLET ORAL
Qty: 30 TABLET | Refills: 0 | Status: SHIPPED | OUTPATIENT
Start: 2022-10-23

## 2022-11-06 DIAGNOSIS — E11.9 TYPE 2 DIABETES MELLITUS WITHOUT COMPLICATION, WITHOUT LONG-TERM CURRENT USE OF INSULIN (HCC): ICD-10-CM

## 2022-11-06 RX ORDER — SEMAGLUTIDE 1.34 MG/ML
0.5 INJECTION, SOLUTION SUBCUTANEOUS WEEKLY
Qty: 4.5 ML | Refills: 0 | Status: SHIPPED | OUTPATIENT
Start: 2022-11-06 | End: 2022-11-07 | Stop reason: SDUPTHER

## 2022-11-07 DIAGNOSIS — E11.9 TYPE 2 DIABETES MELLITUS WITHOUT COMPLICATION, WITHOUT LONG-TERM CURRENT USE OF INSULIN (HCC): ICD-10-CM

## 2022-11-07 DIAGNOSIS — F51.01 PRIMARY INSOMNIA: ICD-10-CM

## 2022-11-10 RX ORDER — TEMAZEPAM 30 MG/1
30 CAPSULE ORAL
Qty: 30 CAPSULE | Refills: 3 | Status: SHIPPED | OUTPATIENT
Start: 2022-11-10

## 2022-11-10 RX ORDER — SEMAGLUTIDE 1.34 MG/ML
0.5 INJECTION, SOLUTION SUBCUTANEOUS WEEKLY
Qty: 4.5 ML | Refills: 3 | Status: SHIPPED | OUTPATIENT
Start: 2022-11-10

## 2022-11-21 DIAGNOSIS — E03.9 HYPOTHYROIDISM, UNSPECIFIED TYPE: ICD-10-CM

## 2022-11-22 DIAGNOSIS — F41.9 ANXIETY: ICD-10-CM

## 2022-11-22 RX ORDER — LEVOTHYROXINE SODIUM 137 UG/1
TABLET ORAL
Qty: 30 TABLET | Refills: 0 | Status: SHIPPED | OUTPATIENT
Start: 2022-11-22

## 2022-11-23 RX ORDER — BUSPIRONE HYDROCHLORIDE 5 MG/1
5 TABLET ORAL DAILY PRN
Qty: 90 TABLET | Refills: 0 | Status: SHIPPED | OUTPATIENT
Start: 2022-11-23

## 2022-11-28 ENCOUNTER — ANESTHESIA (OUTPATIENT)
Dept: GASTROENTEROLOGY | Facility: AMBULARY SURGERY CENTER | Age: 57
End: 2022-11-28

## 2022-11-28 ENCOUNTER — ANESTHESIA EVENT (OUTPATIENT)
Dept: GASTROENTEROLOGY | Facility: AMBULARY SURGERY CENTER | Age: 57
End: 2022-11-28

## 2022-11-28 ENCOUNTER — HOSPITAL ENCOUNTER (OUTPATIENT)
Dept: GASTROENTEROLOGY | Facility: AMBULARY SURGERY CENTER | Age: 57
Setting detail: OUTPATIENT SURGERY
Discharge: HOME | End: 2022-11-28
Attending: INTERNAL MEDICINE

## 2022-11-28 VITALS
RESPIRATION RATE: 20 BRPM | SYSTOLIC BLOOD PRESSURE: 121 MMHG | HEIGHT: 74 IN | HEART RATE: 67 BPM | DIASTOLIC BLOOD PRESSURE: 77 MMHG | BODY MASS INDEX: 30.8 KG/M2 | WEIGHT: 240 LBS | OXYGEN SATURATION: 98 % | TEMPERATURE: 98.6 F

## 2022-11-28 DIAGNOSIS — Z86.010 HX OF COLONIC POLYPS: ICD-10-CM

## 2022-11-28 LAB — GLUCOSE SERPL-MCNC: 84 MG/DL (ref 65–140)

## 2022-11-28 RX ORDER — PROPOFOL 10 MG/ML
INJECTION, EMULSION INTRAVENOUS AS NEEDED
Status: DISCONTINUED | OUTPATIENT
Start: 2022-11-28 | End: 2022-11-28

## 2022-11-28 RX ORDER — SODIUM CHLORIDE, SODIUM LACTATE, POTASSIUM CHLORIDE, CALCIUM CHLORIDE 600; 310; 30; 20 MG/100ML; MG/100ML; MG/100ML; MG/100ML
INJECTION, SOLUTION INTRAVENOUS CONTINUOUS PRN
Status: DISCONTINUED | OUTPATIENT
Start: 2022-11-28 | End: 2022-11-28

## 2022-11-28 RX ORDER — LIDOCAINE HYDROCHLORIDE 10 MG/ML
INJECTION, SOLUTION EPIDURAL; INFILTRATION; INTRACAUDAL; PERINEURAL AS NEEDED
Status: DISCONTINUED | OUTPATIENT
Start: 2022-11-28 | End: 2022-11-28

## 2022-11-28 RX ORDER — SIMETHICONE 20 MG/.3ML
EMULSION ORAL AS NEEDED
Status: COMPLETED | OUTPATIENT
Start: 2022-11-28 | End: 2022-11-28

## 2022-11-28 RX ADMIN — PROPOFOL 50 MG: 10 INJECTION, EMULSION INTRAVENOUS at 08:42

## 2022-11-28 RX ADMIN — PROPOFOL 50 MG: 10 INJECTION, EMULSION INTRAVENOUS at 08:36

## 2022-11-28 RX ADMIN — SODIUM CHLORIDE, SODIUM LACTATE, POTASSIUM CHLORIDE, AND CALCIUM CHLORIDE: .6; .31; .03; .02 INJECTION, SOLUTION INTRAVENOUS at 08:29

## 2022-11-28 RX ADMIN — LIDOCAINE HYDROCHLORIDE 50 MG: 10 INJECTION, SOLUTION EPIDURAL; INFILTRATION; INTRACAUDAL; PERINEURAL at 08:33

## 2022-11-28 RX ADMIN — PROPOFOL 110 MG: 10 INJECTION, EMULSION INTRAVENOUS at 08:33

## 2022-11-28 RX ADMIN — PROPOFOL 40 MG: 10 INJECTION, EMULSION INTRAVENOUS at 08:39

## 2022-11-28 RX ADMIN — Medication 40 MG: at 08:37

## 2022-11-28 RX ADMIN — PROPOFOL 50 MG: 10 INJECTION, EMULSION INTRAVENOUS at 08:45

## 2022-11-28 NOTE — ANESTHESIA POSTPROCEDURE EVALUATION
Post-Op Assessment Note    CV Status:  Stable    Pain management: adequate     Mental Status:  Alert and awake   Hydration Status:  Euvolemic   PONV Controlled:  Controlled   Airway Patency:  Patent      Post Op Vitals Reviewed: Yes      Staff: CRNA         No notable events documented      /66 (11/28/22 0852)    Temp 98 6 °F (37 °C) (11/28/22 0852)    Pulse 66 (11/28/22 0852)   Resp 17 (11/28/22 0852)    SpO2 95 % (11/28/22 0852)

## 2022-11-28 NOTE — ANESTHESIA PREPROCEDURE EVALUATION
Procedure:  COLONOSCOPY    Relevant Problems   ANESTHESIA (within normal limits)      CARDIO   (+) Atypical chest pain   (+) Hyperlipidemia   (+) Hypertension   (+) Migraine      ENDO   (+) Diabetes 1 5, managed as type 2 (HCC)   (+) Hypothyroidism   (+) Type 2 diabetes mellitus without complication, without long-term current use of insulin (HCC)   (+) Uncontrolled type 2 diabetes mellitus with complication, without long-term current use of insulin      GI/HEPATIC   (-) Gastroesophageal reflux disease      /RENAL (within normal limits)      HEMATOLOGY (within normal limits)      MUSCULOSKELETAL   (+) Rectus diastasis      NEURO/PSYCH   (+) Anxiety   (+) Depression   (+) Migraine      PULMONARY   (+) Sleep apnea   (-) Asthma      Stress test 11/10/2016  SUMMARY:  -  Stress results: Duration of exercise was 8 min and 20 sec  Target heart rate  was achieved  There was no chest pain during stress  -  ECG conclusions: The stress ECG was negative for ischemia      IMPRESSIONS: Normal study after maximal exercise without reproduction of symptoms  TTE 11/10/2016  LEFT VENTRICLE:  Systolic function was normal  Ejection fraction was estimated to be 55 %  There were no regional wall motion abnormalities  The changes were consistent with concentric remodeling (increased wall  thickness with normal wall mass)  Physical Exam    Airway    Mallampati score: III  TM Distance: >3 FB  Neck ROM: full     Dental       Cardiovascular      Pulmonary      Other Findings        Anesthesia Plan  ASA Score- 3     Anesthesia Type- IV sedation with anesthesia with ASA Monitors  Additional Monitors:   Airway Plan:           Plan Factors-Exercise tolerance (METS): >4 METS  Chart reviewed  Existing labs reviewed  Patient summary reviewed  Patient is not a current smoker  Induction- intravenous  Postoperative Plan-     Informed Consent- Anesthetic plan and risks discussed with patient    I personally reviewed this patient with the CRNA  Discussed and agreed on the Anesthesia Plan with the CRNA  Toan Mir

## 2022-11-28 NOTE — H&P
History and Physical - [facility]  Amie Mcleod 65 y o  male MRN: [id number]        HPI:  45-year-old male with history of colon polyps  Regular bowel movements  Historical Information   Past Medical History:   Diagnosis Date   • Anxiety    • Colon polyp    • Depression    • Diabetes mellitus ([facility] [address] )    • Disease of thyroid gland     hypothyroid graves disease treated with radiation   • Hyperlipidemia    • Hypertension    • Hyperthyroidism     L A  6/8/14    R    9/10/14     • Irregular heart beat    • Obesity    • Pollen allergy 2014   • Sleep apnea     uses C-Pap machine   • Venous insufficiency     L A 10/14/16   R    1/20/17       Past Surgical History:   Procedure Laterality Date   • CHOLECYSTECTOMY     • GALLBLADDER SURGERY      Anastomosis   • NE COLONOSCOPY FLX DX W/COLLJ SPEC WHEN PFRMD N/A 7/19/2017    Procedure: COLONOSCOPY;  Surgeon: Ebenezer Villarreal MD;  Location: [facility]; Service: Gastroenterology   • UMBILICAL HERNIA REPAIR     • VASCULAR SURGERY     • VASECTOMY      vas deferens      Social History   Social History     Substance and Sexual Activity   Alcohol Use Not Currently    Comment: 1 can per month     Social History     Substance and Sexual Activity   Drug Use No     Social History     Tobacco Use   Smoking Status Never   Smokeless Tobacco Never     Family History   Problem Relation Age of Onset   • Lung cancer Mother    • Prostate cancer Family        Meds/Allergies     (Not in a hospital admission)      No Known Allergies    Objective     Blood pressure 135/86, pulse 74, temperature (!) 97 2 °F (36 2 °C), temperature source Temporal, resp  rate 16, height 6' 2" (1 88 m), weight 109 kg (240 lb), SpO2 97 %      PHYSICAL EXAM:    Gen: NAD  CV: S1 & S2 normal, RRR  CHEST: Clear to auscultate  ABD: soft, NT/ND, good bowel sounds  EXT: no edema    ASSESSMENT:     History of colon polyps    PLAN:    Colonoscopy

## 2022-12-09 DIAGNOSIS — G25.81 RESTLESS LEG SYNDROME: ICD-10-CM

## 2022-12-09 RX ORDER — GABAPENTIN 300 MG/1
600 CAPSULE ORAL
Qty: 180 CAPSULE | Refills: 0 | Status: SHIPPED | OUTPATIENT
Start: 2022-12-09

## 2022-12-15 DIAGNOSIS — E11.9 TYPE 2 DIABETES MELLITUS WITHOUT COMPLICATION, WITHOUT LONG-TERM CURRENT USE OF INSULIN (HCC): ICD-10-CM

## 2022-12-15 RX ORDER — SEMAGLUTIDE 1.34 MG/ML
0.5 INJECTION, SOLUTION SUBCUTANEOUS WEEKLY
Qty: 4.5 ML | Refills: 0 | Status: SHIPPED | OUTPATIENT
Start: 2022-12-15

## 2022-12-16 DIAGNOSIS — E11.8 TYPE 2 DIABETES MELLITUS WITH UNSPECIFIED COMPLICATIONS (HCC): ICD-10-CM

## 2022-12-17 DIAGNOSIS — E78.5 HYPERLIPIDEMIA, UNSPECIFIED HYPERLIPIDEMIA TYPE: ICD-10-CM

## 2022-12-17 RX ORDER — ROSUVASTATIN CALCIUM 5 MG/1
5 TABLET, COATED ORAL DAILY
Qty: 90 TABLET | Refills: 3 | Status: SHIPPED | OUTPATIENT
Start: 2022-12-17

## 2023-01-18 DIAGNOSIS — I10 ESSENTIAL HYPERTENSION: ICD-10-CM

## 2023-01-18 RX ORDER — LISINOPRIL 40 MG/1
TABLET ORAL
Qty: 90 TABLET | Refills: 2 | Status: SHIPPED | OUTPATIENT
Start: 2023-01-18

## 2023-02-06 DIAGNOSIS — F51.01 PRIMARY INSOMNIA: ICD-10-CM

## 2023-02-07 RX ORDER — TEMAZEPAM 30 MG/1
30 CAPSULE ORAL
Qty: 30 CAPSULE | Refills: 0 | Status: SHIPPED | OUTPATIENT
Start: 2023-02-07

## 2023-02-20 DIAGNOSIS — E03.9 HYPOTHYROIDISM, UNSPECIFIED TYPE: ICD-10-CM

## 2023-02-20 DIAGNOSIS — E11.9 TYPE 2 DIABETES MELLITUS WITHOUT COMPLICATION, WITHOUT LONG-TERM CURRENT USE OF INSULIN (HCC): ICD-10-CM

## 2023-02-20 RX ORDER — LEVOTHYROXINE SODIUM 137 UG/1
137 TABLET ORAL DAILY
Qty: 30 TABLET | Refills: 5 | Status: SHIPPED | OUTPATIENT
Start: 2023-02-20

## 2023-03-19 DIAGNOSIS — E03.9 HYPOTHYROIDISM, UNSPECIFIED TYPE: ICD-10-CM

## 2023-03-19 RX ORDER — LEVOTHYROXINE SODIUM 137 UG/1
TABLET ORAL
Qty: 90 TABLET | Refills: 2 | Status: SHIPPED | OUTPATIENT
Start: 2023-03-19

## 2023-03-28 DIAGNOSIS — F41.9 ANXIETY: ICD-10-CM

## 2023-03-29 RX ORDER — BUSPIRONE HYDROCHLORIDE 5 MG/1
5 TABLET ORAL DAILY PRN
Qty: 90 TABLET | Refills: 0 | Status: SHIPPED | OUTPATIENT
Start: 2023-03-29

## 2023-05-04 ENCOUNTER — TELEPHONE (OUTPATIENT)
Dept: INTERNAL MEDICINE CLINIC | Facility: CLINIC | Age: 58
End: 2023-05-04

## 2023-05-04 DIAGNOSIS — E11.9 TYPE 2 DIABETES MELLITUS WITHOUT COMPLICATION, WITHOUT LONG-TERM CURRENT USE OF INSULIN (HCC): Primary | ICD-10-CM

## 2023-05-04 NOTE — TELEPHONE ENCOUNTER
Patient came into set up a wellness appointment and he needs a [employer] form filled out at that appointment  He would need lab order prior (cholesterol, fasting blood glucose or A1c) would you be able to add orders to his chart so he may complete before his appointment on 5/22?

## 2023-05-11 DIAGNOSIS — F51.01 PRIMARY INSOMNIA: ICD-10-CM

## 2023-05-11 DIAGNOSIS — E11.9 TYPE 2 DIABETES MELLITUS WITHOUT COMPLICATION, WITHOUT LONG-TERM CURRENT USE OF INSULIN (HCC): ICD-10-CM

## 2023-05-13 LAB
ALBUMIN SERPL-MCNC: 4.4 G/DL (ref 3.6–5.1)
ALBUMIN/CREAT UR: 1 MCG/MG CREAT
ALBUMIN/GLOB SERPL: 2 (CALC) (ref 1–2.5)
ALP SERPL-CCNC: 59 U/L (ref 35–144)
ALT SERPL-CCNC: 13 U/L (ref 9–46)
AST SERPL-CCNC: 14 U/L (ref 10–35)
BILIRUB SERPL-MCNC: 0.8 MG/DL (ref 0.2–1.2)
BUN SERPL-MCNC: 20 MG/DL (ref 7–25)
BUN/CREAT SERPL: NORMAL (CALC) (ref 6–22)
CALCIUM SERPL-MCNC: 9.6 MG/DL (ref 8.6–10.3)
CHLORIDE SERPL-SCNC: 104 MMOL/L (ref 98–110)
CHOLEST SERPL-MCNC: 111 MG/DL
CHOLEST/HDLC SERPL: 3.5 (CALC)
CO2 SERPL-SCNC: 29 MMOL/L (ref 20–32)
CREAT SERPL-MCNC: 1.13 MG/DL (ref 0.7–1.3)
CREAT UR-MCNC: 153 MG/DL (ref 20–320)
EST. AVERAGE GLUCOSE BLD GHB EST-MCNC: 108 MG/DL
EST. AVERAGE GLUCOSE BLD GHB EST-SCNC: 6 MMOL/L
GFR/BSA.PRED SERPLBLD CYS-BASED-ARV: 76 ML/MIN/1.73M2
GLOBULIN SER CALC-MCNC: 2.2 G/DL (CALC) (ref 1.9–3.7)
GLUCOSE SERPL-MCNC: 95 MG/DL (ref 65–99)
HBA1C MFR BLD: 5.4 % OF TOTAL HGB
HDLC SERPL-MCNC: 32 MG/DL
LDLC SERPL CALC-MCNC: 63 MG/DL (CALC)
MICROALBUMIN UR-MCNC: 0.2 MG/DL
NONHDLC SERPL-MCNC: 79 MG/DL (CALC)
POTASSIUM SERPL-SCNC: 4.1 MMOL/L (ref 3.5–5.3)
PROT SERPL-MCNC: 6.6 G/DL (ref 6.1–8.1)
SODIUM SERPL-SCNC: 140 MMOL/L (ref 135–146)
TRIGL SERPL-MCNC: 75 MG/DL

## 2023-05-13 RX ORDER — TEMAZEPAM 30 MG/1
30 CAPSULE ORAL
Qty: 30 CAPSULE | Refills: 3 | Status: SHIPPED | OUTPATIENT
Start: 2023-05-13

## 2023-05-22 ENCOUNTER — OFFICE VISIT (OUTPATIENT)
Dept: INTERNAL MEDICINE CLINIC | Facility: CLINIC | Age: 58
End: 2023-05-22

## 2023-05-22 VITALS
BODY MASS INDEX: 30.31 KG/M2 | OXYGEN SATURATION: 97 % | HEIGHT: 74 IN | DIASTOLIC BLOOD PRESSURE: 82 MMHG | WEIGHT: 236.2 LBS | SYSTOLIC BLOOD PRESSURE: 126 MMHG | HEART RATE: 78 BPM

## 2023-05-22 DIAGNOSIS — E78.5 HYPERLIPIDEMIA, UNSPECIFIED HYPERLIPIDEMIA TYPE: ICD-10-CM

## 2023-05-22 DIAGNOSIS — I10 PRIMARY HYPERTENSION: ICD-10-CM

## 2023-05-22 DIAGNOSIS — F41.9 ANXIETY: ICD-10-CM

## 2023-05-22 DIAGNOSIS — B35.3 TINEA PEDIS OF LEFT FOOT: ICD-10-CM

## 2023-05-22 DIAGNOSIS — E11.9 TYPE 2 DIABETES MELLITUS WITHOUT COMPLICATION, WITHOUT LONG-TERM CURRENT USE OF INSULIN (HCC): ICD-10-CM

## 2023-05-22 DIAGNOSIS — Z00.00 ANNUAL PHYSICAL EXAM: Primary | ICD-10-CM

## 2023-05-22 DIAGNOSIS — E03.9 HYPOTHYROIDISM, UNSPECIFIED TYPE: ICD-10-CM

## 2023-05-22 RX ORDER — CLOTRIMAZOLE 1 %
CREAM (GRAM) TOPICAL 2 TIMES DAILY
Qty: 60 G | Refills: 0 | Status: SHIPPED | OUTPATIENT
Start: 2023-05-22 | End: 2023-06-19

## 2023-05-22 NOTE — PROGRESS NOTES
ADULT ANNUAL PHYSICAL  [facility]    NAME: Frank Du  AGE: 65 y o  SEX: male  : 1965     DATE: 2023     Assessment and Plan:     Problem List Items Addressed This Visit        Endocrine    Type 2 diabetes mellitus without complication, without long-term current use of insulin (HCC)     - Hemoglobin A1c significantly improved from 5 8 to 5 4  - Discussed potentially decreasing the dose of his metformin to 500 mg twice daily  He wishes to discuss this further with Dr Shaheen Mora before making a change  - Continue current dose of Ozempic    Lab Results   Component Value Date    HGBA1C 5 4 2023            Hypothyroidism     - Last TSH level obtained in   - TSH ordered         Relevant Orders    TSH, 3rd generation with Free T4 reflex       Cardiovascular and Mediastinum    Hypertension     - Blood pressure well controlled  - Continue current antihypertensive regimen            Musculoskeletal and Integument    Tinea pedis     - Provided patient with a prescription for clotrimazole to use twice daily for 28 days  Relevant Medications    clotrimazole (LOTRIMIN) 1 % cream       Other    Anxiety     - Patient reports he typically takes his BuSpar once a day  He feels there is room for improvement in his anxiety  Recommended taking it twice a day  Hyperlipidemia     - LDL at goal  - Continue current dose of rosuvastatin        Other Visit Diagnoses     Annual physical exam    -  Primary          Immunizations and preventive care screenings were discussed with patient today  Appropriate education was printed on patient's after visit summary  BMI Counseling: Body mass index is 30 33 kg/m²  The BMI is above normal  Nutrition recommendations include 3-5 servings of fruits/vegetables daily and increasing intake of lean protein  Exercise recommendations include exercising 3-5 times per week           No follow-ups on file      Chief Complaint:     Chief Complaint   Patient presents with   • Physical Exam      History of Present Illness:     Adult Annual Physical   Patient here for a comprehensive physical exam   Overall he states he is doing well and has no major complaints  He states he was happy to see that his A1c improved to 5 4  He is happy with the results of Ozempic and states he is lost close to 50 pounds since starting medication  He reports he is in the process of cutting back on his [employer] consumption  Regarding his anxiety he notes that work is a trigger for his stress  On most days he takes BuSpar once daily  However on particularly stressful days he will take a second dose around lunchtime  In general he feels his anxiety could be better controlled  Depression Screening  PHQ-2/9 Depression Screening            Review of Systems:     Review of Systems ROS as per HPI     Past Medical History:     Past Medical History:   Diagnosis Date   • Anxiety    • Colon polyp    • Depression    • Diabetes mellitus ([facility] [address] )    • Disease of thyroid gland     hypothyroid graves disease treated with radiation   • Hyperlipidemia    • Hypertension    • Hyperthyroidism     L A  6/8/14    R    9/10/14     • Irregular heart beat    • Obesity    • Pollen allergy 2014   • Sleep apnea     uses C-Pap machine   • Venous insufficiency     L A 10/14/16   R    1/20/17        Past Surgical History:     Past Surgical History:   Procedure Laterality Date   • CHOLECYSTECTOMY     • GALLBLADDER SURGERY      Anastomosis   • MI COLONOSCOPY FLX DX W/COLLJ SPEC WHEN PFRMD N/A 7/19/2017    Procedure: COLONOSCOPY;  Surgeon: Gi Adkins MD;  [facility];   Service: Gastroenterology   • UMBILICAL HERNIA REPAIR     • VASCULAR SURGERY     • VASECTOMY      vas deferens       Family History:     Family History   Problem Relation Age of Onset   • Lung cancer Mother    • Prostate cancer Family       Social History:     Social History Socioeconomic History   • Marital status: /Civil Union     Spouse name: None   • Number of children: None   • Years of education: None   • Highest education level: None   Occupational History     Comment: job physical requirements heavy lifting   Tobacco Use   • Smoking status: Never   • Smokeless tobacco: Never   Vaping Use   • Vaping Use: Never used   Substance and Sexual Activity   • Alcohol use: Not Currently     Comment: 1 can per month   • Drug use: No   • Sexual activity: Yes     Partners: Female     Birth control/protection: Male Sterilization   Other Topics Concern   • None   Social History Narrative    Caffeine use    Work related stress     Social Determinants of Health     Financial Resource Strain: Not on file   Food Insecurity: Not on file   Transportation Needs: Not on file   Physical Activity: Not on file   Stress: Not on file   Social Connections: Not on file   Intimate Partner Violence: Not on file   Housing Stability: Not on file      Current Medications:     Current Outpatient Medications   Medication Sig Dispense Refill   • busPIRone (BUSPAR) 5 mg tablet TAKE 1 TABLET (5 MG TOTAL) BY MOUTH DAILY AS NEEDED (ANXIETY) 90 tablet 0   • clotrimazole (LOTRIMIN) 1 % cream Apply topically 2 (two) times a day for 28 days 60 g 0   • gabapentin (NEURONTIN) 300 mg capsule TAKE 2 CAPSULES (600 MG TOTAL) BY MOUTH DAILY AT BEDTIME 180 capsule 0   • Lancets (OneTouch Delica Plus [id number]) MISC 1 each by Does not apply route daily 100 each 1   • levothyroxine 137 mcg tablet TAKE 1 TABLET BY MOUTH EVERY DAY 90 tablet 2   • lisinopril (ZESTRIL) 40 mg tablet TAKE 1 TABLET BY MOUTH EVERY DAY 90 tablet 2   • metFORMIN (GLUCOPHAGE) 1000 MG tablet TAKE 1 TABLET BY MOUTH TWICE A DAY WITH MEALS 180 tablet 3   • OneTouch Verio test strip USE AS INSTRUCTED 100 each 1   • rosuvastatin (CRESTOR) 5 mg tablet TAKE 1 TABLET (5 MG TOTAL) BY MOUTH DAILY   90 tablet 3   • semaglutide, 0 25 or 0 5 mg/dose, (Ozempic, 0 25 "or 0 5 MG/DOSE,) 2 mg/1 5 mL injection pen Inject 0 38 mL (0 5 mg total) under the skin once a week 4 5 mL 3   • temazepam (RESTORIL) 30 mg capsule Take 1 capsule (30 mg total) by mouth daily at bedtime as needed for sleep 30 capsule 3   • econazole nitrate 1 % cream Apply topically daily for 10 days 15 g 0   • rizatriptan (MAXALT) 10 MG tablet Take 1 tablet (10 mg total) by mouth once as needed for migraine May repeat in 2 hours if needed (Patient not taking: Reported on 10/5/2021) 27 tablet 1     No current facility-administered medications for this visit  Allergies:     No Known Allergies   Physical Exam:     /82   Pulse 78   Ht 6' 2\" (1 88 m)   Wt 107 kg (236 lb 3 2 oz)   SpO2 97%   BMI 30 33 kg/m²     Physical Exam  Cardiovascular:      Pulses: no weak pulses          Dorsalis pedis pulses are 2+ on the right side and 2+ on the left side  Posterior tibial pulses are 1+ on the right side and 1+ on the left side  Feet:      Right foot:      Skin integrity: No ulcer, skin breakdown, erythema, warmth, callus or dry skin  Left foot:      Skin integrity: Erythema present  No ulcer, skin breakdown, warmth, callus or dry skin  General: NAD, Alert and oriented x3   HEENT: NCAT, EOMI, normal conjunctiva  Cardiovascular: RRR, normal S1 and S2, no m/r/g  Pulmonary: Normal respiratory effort, no wheezes, rales or rhonchi  Neuro: Non-focal, ambulating without difficulty, non-antalgic gait  Extremities: No lower extremity edema  Skin: Scaly mildly erythematous patches on the plantar aspect of the left foot  Psychiatric: Normal mood and affect    Patient's shoes and socks removed  Right Foot/Ankle   Right Foot Inspection  Skin Exam: skin normal and skin intact  No dry skin, no warmth, no callus, no erythema, no maceration, no abnormal color, no pre-ulcer, no ulcer and no callus  Toe Exam: ROM and strength within normal limits       Sensory   Proprioception: intact  Monofilament " testing: intact    Vascular  Capillary refills: < 3 seconds  The right DP pulse is 2+  The right PT pulse is 1+  Left Foot/Ankle  Left Foot Inspection  Skin Exam: skin normal, skin intact and erythema  No dry skin, no warmth, no maceration, normal color, no pre-ulcer, no ulcer and no callus  Toe Exam: ROM and strength within normal limits  Sensory   Proprioception: intact  Monofilament testing: intact    Vascular  Capillary refills: < 3 seconds  The left DP pulse is 2+  The left PT pulse is 1+       Assign Risk Category  No deformity present  No loss of protective sensation  No weak pulses  Risk: 0        Preet Melo MD  [id number]

## 2023-05-22 NOTE — ASSESSMENT & PLAN NOTE
- Hemoglobin A1c significantly improved from 5 8 to 5 4  - Discussed potentially decreasing the dose of his metformin to 500 mg twice daily  He wishes to discuss this further with Dr Mateo Falcon before making a change    - Continue current dose of Ozempic    Lab Results   Component Value Date    HGBA1C 5 4 05/12/2023

## 2023-05-22 NOTE — ASSESSMENT & PLAN NOTE
- Patient reports he typically takes his BuSpar once a day  He feels there is room for improvement in his anxiety  Recommended taking it twice a day

## 2023-06-19 LAB — TSH SERPL-ACNC: 1.27 MIU/L (ref 0.4–4.5)

## 2023-06-22 NOTE — PROGRESS NOTES
Assessment/Plan:           Problem List Items Addressed This Visit        Endocrine    Diabetes 1 5, managed as type 2 ([facility] [address] )     Lab Results   Component Value Date    HGBA1C 5 8 (H) 07/25/2018       No results for input(s): POCGLU in the last 72 hours  Blood Sugar Average: Last 72 hrs:   I have counselled the pt to follow a healthy and balanced diet ,and recommend routine exercise  I have counseled patient on the importance of taking his medications routinely he does have laboratory set up to monitoring of his diabetes  Relevant Orders    Ambulatory referral to Ophthalmology       Cardiovascular and Mediastinum    Hypertension     Hypertension - controlled, I have counseled patient following healthy balance diet, I would like the patient reduce sodium, exercise routinely, I would like the patient continued the med current medical regiment and we will continue to monitor  Relevant Medications    lisinopril (ZESTRIL) 10 mg tablet       Other    Anxiety     Anxiety and depression no suicidal ideation he has not started Cymbalta, I have counseled the importance about starting this medication and he plans to start in the near future  Declines suicidal ideation, I have counseled the patient  Relevant Medications    DULoxetine (CYMBALTA) 20 mg capsule    Hyperlipidemia    Relevant Medications    rosuvastatin (CRESTOR) 5 mg tablet    Obesity     Obesity -I have counseled patient following healthy and balanced diet, I would like the patient to lose weight, I would like the patient exercise routinely; we will continue monitor the patient's progress  Other Visit Diagnoses     Need for influenza vaccination    -  Primary    Hypothyroidism, unspecified type        Relevant Medications    levothyroxine 125 mcg tablet          Return to office 6  months  call if any problems  Subjective:      Patient ID: Kristen Nolan is a 47 y o  male      HPI 47 male anxiety, essential hypertension, Jorge Shelton was admitted to 213 from ED via bed accompanied by Other Ed RN.   Reason for hospitalization is syncopy.   Upon arrival, patient is stable. Patient has history significant for   Past Medical History:   Diagnosis Date   • Asymptomatic bilateral carotid artery stenosis     Seen on CT angio 2018   • Borderline glaucoma with ocular hypertension    • BPH (benign prostatic hyperplasia)    • Centrilobular emphysema (CMD)     Seen on CT 9-2022   • Cerebral hemorrhage with cognitive deficits (CMD)    • Diverticulosis of colon    • History of melanoma    • HLD (hyperlipidemia)    • HTN (hypertension), benign    • Hypothyroidism (acquired)    • [place] on CPAP    • Prediabetes    • Psychosexual dysfunction with inhibited sexual excitement     Sexual dysfunction   • Spontaneous pneumothorax     11/4/2022 S/P Right Thoracoscopy (VATS) Lower Lobe Wedge, Adhesiolysis and Talc Pleurodesis with Dr Callejas. 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  Patient oriented to bed, call light, , room and unit.  Patient provided with the following educational materials upon admission: .   Level of understanding patient verbalized understanding.   Admission orders received at this time.   MD notified of patient arrival.   See Epic documentation for patient individualized nursing care plan.      Dr Singelton updated on patient's RLE being warm, swollen, and having an open sore on the right medial ankle.    hyperlipidemia, type 2 diabetes and obesity;   no si no hi hopeless  dosent want to do anything; didn't start cpap yet , he has not started the SSRI/Cymbalta at this point time but is willing to do so  Reports me symptoms of depression but no suicidal ideation low motivation he has been sitting on the couch and not interacting with his family members as much  He reports me he is forgetting his medications at times  He does not care  The following portions of the patient's history were reviewed and updated as appropriate: allergies, current medications, past family history, past medical history, past social history, past surgical history and problem list     Review of Systems   Constitutional: Negative for activity change, appetite change and unexpected weight change  HENT: Negative for congestion and postnasal drip  Eyes: Negative for visual disturbance  Respiratory: Negative for cough and shortness of breath  Cardiovascular: Negative for chest pain  Gastrointestinal: Negative for abdominal pain, diarrhea, nausea and vomiting  Neurological: Negative for dizziness, light-headedness and headaches  Hematological: Negative for adenopathy  Psychiatric/Behavioral: Negative for suicidal ideas  The patient is nervous/anxious  Depression         Objective:      /88 (BP Location: Left arm, Patient Position: Sitting, Cuff Size: Standard)   Pulse 71   Resp 16   Ht 6' 2" (1 88 m)   Wt 127 kg (280 lb 6 4 oz)   SpO2 96%   BMI 36 00 kg/m²          Physical Exam   Constitutional: He appears well-developed and well-nourished  No distress  HENT:   Head: Normocephalic and atraumatic  Right Ear: External ear normal    Left Ear: External ear normal    Mouth/Throat: Oropharynx is clear and moist    Eyes: Pupils are equal, round, and reactive to light  Conjunctivae are normal  Right eye exhibits no discharge  Left eye exhibits no discharge  No scleral icterus  Neck: Neck supple  Cardiovascular: Normal rate, regular rhythm and normal heart sounds  Exam reveals no gallop and no friction rub  No murmur heard  Pulmonary/Chest: No respiratory distress  He has no wheezes  He has no rales  Abdominal: Soft  Bowel sounds are normal  He exhibits no distension and no mass  There is no tenderness  There is no rebound and no guarding  Musculoskeletal: He exhibits no edema or deformity  Lymphadenopathy:     He has no cervical adenopathy  Neurological: He is alert  Skin: He is not diaphoretic  Psychiatric: His mood appears anxious  He exhibits a depressed mood  He expresses no suicidal ideation

## 2023-06-23 ENCOUNTER — OFFICE VISIT (OUTPATIENT)
Dept: INTERNAL MEDICINE CLINIC | Facility: CLINIC | Age: 58
End: 2023-06-23

## 2023-06-23 VITALS
DIASTOLIC BLOOD PRESSURE: 72 MMHG | OXYGEN SATURATION: 98 % | HEART RATE: 62 BPM | WEIGHT: 232.6 LBS | SYSTOLIC BLOOD PRESSURE: 118 MMHG | BODY MASS INDEX: 29.86 KG/M2

## 2023-06-23 DIAGNOSIS — E03.9 HYPOTHYROIDISM, UNSPECIFIED TYPE: Primary | ICD-10-CM

## 2023-06-23 DIAGNOSIS — E11.65 TYPE 2 DIABETES MELLITUS WITH HYPERGLYCEMIA, UNSPECIFIED WHETHER LONG TERM INSULIN USE (HCC): ICD-10-CM

## 2023-06-23 DIAGNOSIS — I10 ESSENTIAL HYPERTENSION: ICD-10-CM

## 2023-06-23 DIAGNOSIS — F41.9 ANXIETY: ICD-10-CM

## 2023-06-23 DIAGNOSIS — I10 PRIMARY HYPERTENSION: ICD-10-CM

## 2023-06-23 DIAGNOSIS — E78.5 HYPERLIPIDEMIA, UNSPECIFIED HYPERLIPIDEMIA TYPE: ICD-10-CM

## 2023-06-23 DIAGNOSIS — Z00.00 HEALTHCARE MAINTENANCE: ICD-10-CM

## 2023-06-23 DIAGNOSIS — F32.A DEPRESSION, UNSPECIFIED DEPRESSION TYPE: ICD-10-CM

## 2023-06-23 DIAGNOSIS — E11.8 TYPE 2 DIABETES MELLITUS WITH UNSPECIFIED COMPLICATIONS (HCC): ICD-10-CM

## 2023-06-23 DIAGNOSIS — G47.30 SLEEP APNEA, UNSPECIFIED TYPE: ICD-10-CM

## 2023-06-23 RX ORDER — BUSPIRONE HYDROCHLORIDE 5 MG/1
5 TABLET ORAL DAILY PRN
Qty: 90 TABLET | Refills: 0 | Status: SHIPPED | OUTPATIENT
Start: 2023-06-23 | End: 2023-06-23 | Stop reason: SDUPTHER

## 2023-06-23 RX ORDER — BUSPIRONE HYDROCHLORIDE 7.5 MG/1
7.5 TABLET ORAL DAILY PRN
Qty: 90 TABLET | Refills: 1 | Status: SHIPPED | OUTPATIENT
Start: 2023-06-23

## 2023-06-23 RX ORDER — LISINOPRIL 40 MG/1
20 TABLET ORAL DAILY
Qty: 90 TABLET | Refills: 2 | Status: SHIPPED | OUTPATIENT
Start: 2023-06-23

## 2023-06-23 NOTE — ASSESSMENT & PLAN NOTE
Significant improvements his blood pressure in fact is on the low end of normal at this point; patient tends to continue to lose weight he will increase his activity levels and start walking 30 minutes 3 times weekly at this point in time we will reduce lisinopril to 20 mg daily he will come back for blood pressure check to ensure his blood pressure is stable in 4 weeks

## 2023-06-23 NOTE — ASSESSMENT & PLAN NOTE
Lab Results   Component Value Date    HGBA1C 5 4 05/12/2023   Excellent control he has made significant progress at this point in time we will reduce the metformin to 500 mg twice daily, he will continue with current dose of Ozempic 0 5 subcu weekly  I will be ordering diabetic laboratories including comprehensive metabolic panel, hemoglobin A1c, urine microalbumin, lipid panel  Foot examination completed slight bruising of the right cuticle first toe resolving patient to monitor if bruise does not completely resolve next 4 weeks he will notify me

## 2023-06-23 NOTE — PROGRESS NOTES
Assessment/Plan:    Hypothyroidism  Hypothyroidism controlled the patient is currently euthyroid I will be ordering a TSH prior to the next office visit and the patient will continue with current medical regiment; we will continue to monitor the patient's progress  Type 2 diabetes mellitus with hyperglycemia, unspecified whether long term insulin use (HCC)    Lab Results   Component Value Date    HGBA1C 5 4 05/12/2023   Excellent control he has made significant progress at this point in time we will reduce the metformin to 500 mg twice daily, he will continue with current dose of Ozempic 0 5 subcu weekly  I will be ordering diabetic laboratories including comprehensive metabolic panel, hemoglobin A1c, urine microalbumin, lipid panel  Foot examination completed slight bruising of the right cuticle first toe resolving patient to monitor if bruise does not completely resolve next 4 weeks he will notify me  Hypertension  Significant improvements his blood pressure in fact is on the low end of normal at this point; patient tends to continue to lose weight he will increase his activity levels and start walking 30 minutes 3 times weekly at this point in time we will reduce lisinopril to 20 mg daily he will come back for blood pressure check to ensure his blood pressure is stable in 4 weeks    Anxiety  Mild anxiety, chronic depression no SI we will increase BuSpar 7 5 mg once daily as needed he is initially is found BuSpar to be very effective but no last 6 months it has become less effective therefore we will increase BuSpar to 7 5 mg once daily he tolerates well  Hyperlipidemia  Hyperlipidemia controlled continue with current medical regiment recommend a low-cholesterol diet and recommend routine exercise we will continue to monitor the progress    Continue Crestor 5 mg once daily    Sleep apnea  Is not able to tolerate the CPAP is making significant improvements with his weight loss he has noticed some improvements in his sleep quality we will consider a repeat sleep study after further weight loss will consider in the next year    Depression  Chronic depression mild related to relationship with his wife which is currently stable no SI patient declines counseling  he reports me his wife was declined marital counseling  We will increase BuSpar to 7 5 mg once daily as needed we will continue monitor         Problem List Items Addressed This Visit        Endocrine    Hypothyroidism - Primary     Hypothyroidism controlled the patient is currently euthyroid I will be ordering a TSH prior to the next office visit and the patient will continue with current medical regiment; we will continue to monitor the patient's progress  Type 2 diabetes mellitus with hyperglycemia, unspecified whether long term insulin use (HCC)       Lab Results   Component Value Date    HGBA1C 5 4 05/12/2023   Excellent control he has made significant progress at this point in time we will reduce the metformin to 500 mg twice daily, he will continue with current dose of Ozempic 0 5 subcu weekly  I will be ordering diabetic laboratories including comprehensive metabolic panel, hemoglobin A1c, urine microalbumin, lipid panel  Foot examination completed slight bruising of the right cuticle first toe resolving patient to monitor if bruise does not completely resolve next 4 weeks he will notify me           Relevant Medications    metFORMIN (GLUCOPHAGE) 1000 MG tablet    Other Relevant Orders    Comprehensive metabolic panel    Lipid Panel with Direct LDL reflex    TSH, 3rd generation with Free T4 reflex    Hemoglobin A1c (w/out EAG) ([facility] ONLY)    Microalbumin, Random Urine (W/Creatinine) ([facility] ONLY)       Respiratory    Sleep apnea     Is not able to tolerate the CPAP is making significant improvements with his weight loss he has noticed some improvements in his sleep quality we will consider a repeat sleep study after further weight loss will consider in the next year            Cardiovascular and Mediastinum    Hypertension     Significant improvements his blood pressure in fact is on the low end of normal at this point; patient tends to continue to lose weight he will increase his activity levels and start walking 30 minutes 3 times weekly at this point in time we will reduce lisinopril to 20 mg daily he will come back for blood pressure check to ensure his blood pressure is stable in 4 weeks         Relevant Medications    lisinopril (ZESTRIL) 40 mg tablet       Other    Anxiety     Mild anxiety, chronic depression no SI we will increase BuSpar 7 5 mg once daily as needed he is initially is found BuSpar to be very effective but no last 6 months it has become less effective therefore we will increase BuSpar to 7 5 mg once daily he tolerates well  Relevant Medications    busPIRone (BUSPAR) 7 5 mg tablet    Hyperlipidemia     Hyperlipidemia controlled continue with current medical regiment recommend a low-cholesterol diet and recommend routine exercise we will continue to monitor the progress  Continue Crestor 5 mg once daily         Depression     Chronic depression mild related to relationship with his wife which is currently stable no SI patient declines counseling  he reports me his wife was declined marital counseling  We will increase BuSpar to 7 5 mg once daily as needed we will continue monitor         Relevant Medications    busPIRone (BUSPAR) 7 5 mg tablet   Other Visit Diagnoses     Type 2 diabetes mellitus with unspecified complications (HCC)        Relevant Medications    metFORMIN (GLUCOPHAGE) 1000 MG tablet    Essential hypertension        Relevant Medications    lisinopril (ZESTRIL) 40 mg tablet    Healthcare maintenance        Relevant Orders    Varicella zoster antibody, IgG          Return to office 6  months  call if any problems  Flu vaccine in the fall    He also reports me mild tingling of the right hand when with overuse like fishing for many hours symptoms are very intermittent could consider a wrist splint likely mild carpal tunnel will observe for now  Subjective:      Patient ID: Priyank Mchugh is a 63 y o  male  HPI 59-year old male coming in for a follow up visit regarding hypothyroidism, type 2 diabetes, hypertension, depression/anxiety, sleep apnea, overweight; the patient reports me compliant taking medications without untoward side effects the  The patient is here to review his medical condition, update me on the medical condition and the patient reports me no hospitalizations and no ER visits  He reports she overall doing very well he reports to me he has made progress with his diet he is cut his soda down significantly only 1 to 2 cans/week  He reports me he enjoys going axe throwing weekly  He reports me mild chronic depression related to relationship with wife  He reports me symptoms are unchanged his situation is unchanged compared to last time  No SI he reports me she would not want marital counseling  He reports me his sleep quality is improving with the weight loss he has not been able to tolerate CPAP  He is here to review his laboratories  He does report to me mild increase in his anxiety primarily at work -things at work that I have no controll of, thought process becomes disorganized , people at work notice , last 6 months ,  Initially the buspar was helpful but not working as well   No si  Mod depression because of situation with wife    He reports me BuSpar was initially effective but over the last 6 to 12 months it has become less effective he tolerates well without any side effects no sedation    The following portions of the patient's history were reviewed and updated as appropriate: allergies, current medications, past family history, past medical history, past social history, past surgical history and problem list     Review of Systems   Constitutional: Negative for activity change, appetite change and unexpected weight change  HENT: Negative for congestion and postnasal drip  Eyes: Negative for visual disturbance  Respiratory: Negative for cough and shortness of breath  Cardiovascular: Negative for chest pain  Gastrointestinal: Negative for abdominal pain, diarrhea, nausea and vomiting  Neurological: Negative for dizziness, light-headedness and headaches  Psychiatric/Behavioral: Negative for suicidal ideas  The patient is nervous/anxious  Objective:    Return in about 6 months (around 12/23/2023)  No results found  No Known Allergies    Past Medical History:   Diagnosis Date   • Anxiety    • Colon polyp    • Depression    • Diabetes mellitus ([facility] [address] )    • Disease of thyroid gland     hypothyroid graves disease treated with radiation   • Hyperlipidemia    • Hypertension    • Hyperthyroidism     L A  6/8/14    R    9/10/14     • Irregular heart beat    • Obesity    • Pollen allergy 2014   • Sleep apnea     uses C-Pap machine   • Venous insufficiency     L A 10/14/16   R    1/20/17       Past Surgical History:   Procedure Laterality Date   • CHOLECYSTECTOMY     • GALLBLADDER SURGERY      Anastomosis   • IL COLONOSCOPY FLX DX W/COLLJ SPEC WHEN PFRMD N/A 7/19/2017    Procedure: COLONOSCOPY;  Surgeon: Davidson Lindsey MD;  Location: [facility];   Service: Gastroenterology   • UMBILICAL HERNIA REPAIR     • VASCULAR SURGERY     • VASECTOMY      vas deferens      Current Outpatient Medications on File Prior to Visit   Medication Sig Dispense Refill   • gabapentin (NEURONTIN) 300 mg capsule TAKE 2 CAPSULES (600 MG TOTAL) BY MOUTH DAILY AT BEDTIME 180 capsule 0   • Lancets (OneTouch Delica Plus [id number]) MISC 1 each by Does not apply route daily 100 each 1   • levothyroxine 137 mcg tablet TAKE 1 TABLET BY MOUTH EVERY DAY 90 tablet 2   • OneTouch Verio test strip USE AS INSTRUCTED 100 each 1   • rizatriptan (MAXALT) 10 MG tablet Take 1 tablet (10 mg total) by mouth once as needed for migraine May repeat in 2 hours if needed 27 tablet 1   • rosuvastatin (CRESTOR) 5 mg tablet TAKE 1 TABLET (5 MG TOTAL) BY MOUTH DAILY  90 tablet 3   • semaglutide, 0 25 or 0 5 mg/dose, (Ozempic, 0 25 or 0 5 MG/DOSE,) 2 mg/1 5 mL injection pen Inject 0 38 mL (0 5 mg total) under the skin once a week 4 5 mL 3   • temazepam (RESTORIL) 30 mg capsule Take 1 capsule (30 mg total) by mouth daily at bedtime as needed for sleep 30 capsule 3   • [DISCONTINUED] busPIRone (BUSPAR) 5 mg tablet TAKE 1 TABLET (5 MG TOTAL) BY MOUTH DAILY AS NEEDED (ANXIETY) 90 tablet 0   • [DISCONTINUED] lisinopril (ZESTRIL) 40 mg tablet TAKE 1 TABLET BY MOUTH EVERY DAY 90 tablet 2   • [DISCONTINUED] metFORMIN (GLUCOPHAGE) 1000 MG tablet TAKE 1 TABLET BY MOUTH TWICE A DAY WITH MEALS 180 tablet 3   • clotrimazole (LOTRIMIN) 1 % cream Apply topically 2 (two) times a day for 28 days 60 g 0   • econazole nitrate 1 % cream Apply topically daily for 10 days 15 g 0     No current facility-administered medications on file prior to visit       Family History   Problem Relation Age of Onset   • Lung cancer Mother    • Prostate cancer Family      Social History     Socioeconomic History   • Marital status: /Civil Union     Spouse name: Not on file   • Number of children: Not on file   • Years of education: Not on file   • Highest education level: Not on file   Occupational History     Comment: job physical requirements heavy lifting   Tobacco Use   • Smoking status: Never   • Smokeless tobacco: Never   Vaping Use   • Vaping Use: Never used   Substance and Sexual Activity   • Alcohol use: Not Currently     Comment: 1 can per month   • Drug use: No   • Sexual activity: Yes     Partners: Female     Birth control/protection: Male Sterilization   Other Topics Concern   • Not on file   Social History Narrative    Caffeine use    Work related stress     Social Determinants of Health     Financial Resource Strain: Not on file   Food Insecurity: Not on file   Transportation Needs: Not on file   Physical Activity: Not on file   Stress: Not on file   Social Connections: Not on file   Intimate Partner Violence: Not on file   Housing Stability: Not on file     Vitals:    06/23/23 0756   BP: 118/72   Pulse: 62   SpO2: 98%   Weight: 106 kg (232 lb 9 6 oz)     Results for orders placed or performed in visit on 06/19/23   TSH, 3rd generation with Free T4 reflex   Result Value Ref Range    TSH W/RFX TO FREE T4 1 27 0 40 - 4 50 mIU/L     Weight (last 2 days)     Date/Time Weight    06/23/23 0756 106 (232 6)        Body mass index is 29 86 kg/m²  /72 (06/23/23 0756)    Temp      Pulse 62 (06/23/23 0756)   Resp      SpO2 98 % (06/23/23 0756)      Vitals:    06/23/23 0756   Weight: 106 kg (232 lb 9 6 oz)     Vitals:    06/23/23 0756   Weight: 106 kg (232 lb 9 6 oz)       /72   Pulse 62   Wt 106 kg (232 lb 9 6 oz)   SpO2 98%   BMI 29 86 kg/m²          Physical Exam  Vitals and nursing note reviewed  Constitutional:       General: He is not in acute distress  Appearance: Normal appearance  He is well-developed  He is not ill-appearing, toxic-appearing or diaphoretic  HENT:      Head: Normocephalic and atraumatic  Right Ear: External ear normal       Left Ear: External ear normal    Eyes:      General: No scleral icterus  Right eye: No discharge  Left eye: No discharge  Conjunctiva/sclera: Conjunctivae normal       Pupils: Pupils are equal, round, and reactive to light  Cardiovascular:      Rate and Rhythm: Normal rate and regular rhythm  Pulses: no weak pulses          Dorsalis pedis pulses are 2+ on the right side  Posterior tibial pulses are 2+ on the right side and 2+ on the left side  Heart sounds: Normal heart sounds  No murmur heard  No friction rub  No gallop  Pulmonary:      Effort: No respiratory distress  Breath sounds: No wheezing or rales     Abdominal:      General: Bowel sounds are normal  There is no distension  Palpations: Abdomen is soft  There is no mass  Tenderness: There is no abdominal tenderness  There is no guarding or rebound  Musculoskeletal:         General: No deformity  Cervical back: Neck supple  Feet:      Right foot:      Skin integrity: No ulcer, skin breakdown, erythema, warmth, callus or dry skin  Left foot:      Skin integrity: No ulcer, skin breakdown, erythema, warmth, callus or dry skin  Lymphadenopathy:      Cervical: No cervical adenopathy  Neurological:      Mental Status: He is alert  Diabetic Foot Exam    Patient's shoes and socks removed  Right Foot/Ankle   Right Foot Inspection  Skin Exam: skin normal and skin intact  No dry skin, no warmth, no callus, no erythema, no maceration, no abnormal color, no pre-ulcer, no ulcer and no callus  Toe Exam: ROM and strength within normal limits  Sensory   Monofilament testing: intact    Vascular  Capillary refills: < 3 seconds  The right DP pulse is 2+  The right PT pulse is 2+  Left Foot/Ankle  Left Foot Inspection  Skin Exam: skin normal and skin intact  No dry skin, no warmth, no erythema, no maceration, normal color, no pre-ulcer, no ulcer and no callus  Toe Exam: ROM and strength within normal limits  Sensory   Monofilament testing: intact    Vascular  Capillary refills: < 3 seconds  The left PT pulse is 2+       Assign Risk Category  No deformity present  No loss of protective sensation  No weak pulses  Risk: 0  Slight bruising of the right cuticle first toe

## 2023-06-23 NOTE — ASSESSMENT & PLAN NOTE
Chronic depression mild related to relationship with his wife which is currently stable no SI patient declines counseling  he reports me his wife was declined marital counseling    We will increase BuSpar to 7 5 mg once daily as needed we will continue monitor

## 2023-06-23 NOTE — ASSESSMENT & PLAN NOTE
Is not able to tolerate the CPAP is making significant improvements with his weight loss he has noticed some improvements in his sleep quality we will consider a repeat sleep study after further weight loss will consider in the next year

## 2023-06-23 NOTE — ASSESSMENT & PLAN NOTE
Mild anxiety, chronic depression no SI we will increase BuSpar 7 5 mg once daily as needed he is initially is found BuSpar to be very effective but no last 6 months it has become less effective therefore we will increase BuSpar to 7 5 mg once daily he tolerates well

## 2023-07-03 DIAGNOSIS — I10 ESSENTIAL HYPERTENSION: ICD-10-CM

## 2023-07-03 DIAGNOSIS — E11.8 TYPE 2 DIABETES MELLITUS WITH UNSPECIFIED COMPLICATIONS (HCC): ICD-10-CM

## 2023-07-03 DIAGNOSIS — F51.01 PRIMARY INSOMNIA: ICD-10-CM

## 2023-07-03 RX ORDER — TEMAZEPAM 30 MG/1
30 CAPSULE ORAL
Qty: 30 CAPSULE | Refills: 0 | Status: SHIPPED | OUTPATIENT
Start: 2023-07-03

## 2023-07-03 RX ORDER — LISINOPRIL 40 MG/1
20 TABLET ORAL DAILY
Qty: 90 TABLET | Refills: 1 | Status: SHIPPED | OUTPATIENT
Start: 2023-07-03

## 2023-07-24 DIAGNOSIS — I10 ESSENTIAL HYPERTENSION: ICD-10-CM

## 2023-07-24 DIAGNOSIS — E11.8 TYPE 2 DIABETES MELLITUS WITH UNSPECIFIED COMPLICATIONS (HCC): ICD-10-CM

## 2023-07-24 RX ORDER — LISINOPRIL 20 MG/1
20 TABLET ORAL DAILY
Qty: 90 TABLET | Refills: 1 | Status: SHIPPED | OUTPATIENT
Start: 2023-07-24

## 2023-09-27 DIAGNOSIS — E11.9 TYPE 2 DIABETES MELLITUS WITHOUT COMPLICATION, WITHOUT LONG-TERM CURRENT USE OF INSULIN (HCC): ICD-10-CM

## 2023-09-27 DIAGNOSIS — F51.01 PRIMARY INSOMNIA: ICD-10-CM

## 2023-09-27 RX ORDER — TEMAZEPAM 30 MG/1
30 CAPSULE ORAL
Qty: 30 CAPSULE | Refills: 0 | Status: SHIPPED | OUTPATIENT
Start: 2023-09-27

## 2023-12-23 DIAGNOSIS — E03.9 HYPOTHYROIDISM, UNSPECIFIED TYPE: ICD-10-CM

## 2023-12-23 DIAGNOSIS — E78.5 HYPERLIPIDEMIA, UNSPECIFIED HYPERLIPIDEMIA TYPE: ICD-10-CM

## 2023-12-23 DIAGNOSIS — F41.9 ANXIETY: ICD-10-CM

## 2023-12-26 RX ORDER — ROSUVASTATIN CALCIUM 5 MG/1
5 TABLET, COATED ORAL DAILY
Qty: 90 TABLET | Refills: 1 | Status: SHIPPED | OUTPATIENT
Start: 2023-12-26

## 2023-12-26 RX ORDER — LEVOTHYROXINE SODIUM 137 UG/1
TABLET ORAL
Qty: 90 TABLET | Refills: 1 | Status: SHIPPED | OUTPATIENT
Start: 2023-12-26

## 2023-12-27 RX ORDER — BUSPIRONE HYDROCHLORIDE 7.5 MG/1
7.5 TABLET ORAL DAILY PRN
Qty: 90 TABLET | Refills: 1 | Status: SHIPPED | OUTPATIENT
Start: 2023-12-27

## 2023-12-29 ENCOUNTER — OFFICE VISIT (OUTPATIENT)
Dept: INTERNAL MEDICINE CLINIC | Facility: CLINIC | Age: 58
End: 2023-12-29

## 2023-12-29 VITALS
RESPIRATION RATE: 16 BRPM | HEIGHT: 74 IN | BODY MASS INDEX: 31.57 KG/M2 | SYSTOLIC BLOOD PRESSURE: 124 MMHG | OXYGEN SATURATION: 96 % | DIASTOLIC BLOOD PRESSURE: 84 MMHG | HEART RATE: 73 BPM | WEIGHT: 246 LBS

## 2023-12-29 DIAGNOSIS — F32.A DEPRESSION, UNSPECIFIED DEPRESSION TYPE: ICD-10-CM

## 2023-12-29 DIAGNOSIS — E11.9 TYPE 2 DIABETES MELLITUS WITHOUT COMPLICATION, WITHOUT LONG-TERM CURRENT USE OF INSULIN (HCC): ICD-10-CM

## 2023-12-29 DIAGNOSIS — E66.01 CLASS 2 SEVERE OBESITY DUE TO EXCESS CALORIES WITH SERIOUS COMORBIDITY AND BODY MASS INDEX (BMI) OF 36.0 TO 36.9 IN ADULT: ICD-10-CM

## 2023-12-29 DIAGNOSIS — E03.9 HYPOTHYROIDISM, UNSPECIFIED TYPE: ICD-10-CM

## 2023-12-29 DIAGNOSIS — E11.8 TYPE 2 DIABETES MELLITUS WITH UNSPECIFIED COMPLICATIONS (HCC): ICD-10-CM

## 2023-12-29 DIAGNOSIS — E78.5 HYPERLIPIDEMIA, UNSPECIFIED HYPERLIPIDEMIA TYPE: ICD-10-CM

## 2023-12-29 DIAGNOSIS — Z23 ENCOUNTER FOR IMMUNIZATION: Primary | ICD-10-CM

## 2023-12-29 RX ORDER — BUPROPION HYDROCHLORIDE 150 MG/1
150 TABLET ORAL DAILY
Qty: 30 TABLET | Refills: 3 | Status: SHIPPED | OUTPATIENT
Start: 2023-12-29

## 2023-12-29 NOTE — PROGRESS NOTES
Assessment/Plan:    Type 2 diabetes mellitus without complication, without long-term current use of insulin (HCC)    Lab Results   Component Value Date    HGBA1C 5.4 05/12/2023   I have counselled the pt to follow a healthy and balanced diet ,and recommend routine exercise.  Patient will be going for his diabetic labs orders are in the chart he did not complete his labs at this point he reports he will go next several weeks.  Also we had a long conversation today about diet, exercise, weight loss and getting back on track with his lifestyle management patient is very motivated to make changes.  He has agreed to try his best.    Obesity  Obesity -I have counseled patient following healthy and balanced diet, I would like the patient to lose weight, I would like the patient exercise routinely; we will continue monitor the patient's progress.  Patient does report to me dietary indiscretion I have discussed with the patient at length improving his diet reducing carbohydrates and sweets discontinuing soda and routine exercise    Hypothyroidism  Hypothyroidism controlled the patient is currently euthyroid I will be ordering a TSH prior to the next office visit and the patient will continue with current medical regiment; we will continue to monitor the patient's progress.    Hyperlipidemia  Hyperlipidemia controlled continue with current medical regiment recommend a low-cholesterol diet and recommend routine exercise we will continue to monitor the progress.    Depression  Moderate major depression no suicidal ideation patient declines psychiatry consultation he has been intolerant to multiple SSRIs in the past Rx for Wellbutrin  mg 1 tab once daily reviewed the risk benefits and side effects of the medication including 1 in 1000 risk of seizure         Problem List Items Addressed This Visit        Endocrine    Type 2 diabetes mellitus without complication, without long-term current use of insulin (HCC)       Lab  Results   Component Value Date    HGBA1C 5.4 05/12/2023   I have counselled the pt to follow a healthy and balanced diet ,and recommend routine exercise.  Patient will be going for his diabetic labs orders are in the chart he did not complete his labs at this point he reports he will go next several weeks.  Also we had a long conversation today about diet, exercise, weight loss and getting back on track with his lifestyle management patient is very motivated to make changes.  He has agreed to try his best.         Hypothyroidism     Hypothyroidism controlled the patient is currently euthyroid I will be ordering a TSH prior to the next office visit and the patient will continue with current medical regiment; we will continue to monitor the patient's progress.            Other    Hyperlipidemia     Hyperlipidemia controlled continue with current medical regiment recommend a low-cholesterol diet and recommend routine exercise we will continue to monitor the progress.         Obesity     Obesity -I have counseled patient following healthy and balanced diet, I would like the patient to lose weight, I would like the patient exercise routinely; we will continue monitor the patient's progress.  Patient does report to me dietary indiscretion I have discussed with the patient at length improving his diet reducing carbohydrates and sweets discontinuing soda and routine exercise         Depression     Moderate major depression no suicidal ideation patient declines psychiatry consultation he has been intolerant to multiple SSRIs in the past Rx for Wellbutrin  mg 1 tab once daily reviewed the risk benefits and side effects of the medication including 1 in 1000 risk of seizure         Relevant Medications    buPROPion (Wellbutrin XL) 150 mg 24 hr tablet   Other Visit Diagnoses     Encounter for immunization    -  Primary    Relevant Orders    influenza vaccine, quadrivalent, 0.5 mL, preservative-free, for adult and pediatric  patients 6 mos+ (AFLURIA, FLUARIX, FLULAVAL, FLUZONE) (Completed)    Type 2 diabetes mellitus with unspecified complications ([facility])              Today I did speak to patient about GeneSight we will consider this for next visit if he is not responding to Wellbutrin  Subjective:      Patient ID:   Expand All Collapse All       Assessment/Plan:     Hypothyroidism  Hypothyroidism controlled the patient is currently euthyroid I will be ordering a TSH prior to the next office visit and the patient will continue with current medical regiment; we will continue to monitor the patient's progress.     Type 2 diabetes mellitus with hyperglycemia, unspecified whether long term insulin use (HCC)           Lab Results   Component Value Date     HGBA1C 5.4 05/12/2023   Excellent control he has made significant progress at this point in time we will reduce the metformin to 500 mg twice daily, he will continue with current dose of Ozempic 0.5 subcu weekly.  I will be ordering diabetic laboratories including comprehensive metabolic panel, hemoglobin A1c, urine microalbumin, lipid panel.  Foot examination completed slight bruising of the right cuticle first toe resolving patient to monitor if bruise does not completely resolve next 4 weeks he will notify me.     Hypertension  Significant improvements his blood pressure in fact is on the low end of normal at this point; patient tends to continue to lose weight he will increase his activity levels and start walking 30 minutes 3 times weekly at this point in time we will reduce lisinopril to 20 mg daily he will come back for blood pressure check to ensure his blood pressure is stable in 4 weeks     Anxiety  Mild anxiety, chronic depression no SI we will increase BuSpar 7.5 mg once daily as needed he is initially is found BuSpar to be very effective but no last 6 months it has become less effective therefore we will increase BuSpar to 7.5 mg once daily he tolerates well.        Hyperlipidemia  Hyperlipidemia controlled continue with current medical regiment recommend a low-cholesterol diet and recommend routine exercise we will continue to monitor the progress.  Continue Crestor 5 mg once daily     Sleep apnea  Is not able to tolerate the CPAP is making significant improvements with his weight loss he has noticed some improvements in his sleep quality we will consider a repeat sleep study after further weight loss will consider in the next year     Depression  Chronic depression mild related to relationship with his wife which is currently stable no SI patient declines counseling  he reports me his wife was d    is a 58 y.o. male.    HPI 58-year old male coming in for a follow up visit regarding type 2 diabetes, obesity class II, hypothyroidism, hyperlipidemia and depression; the patient reports me compliant taking medications without untoward side effects the.  The patient is here to review his medical condition, update me on the medical condition and the patient reports me no hospitalizations and no ER visits.  Patient reports me major stressors in his life  patient reports me ongoing marital problems patient is contemplating separation at this point, stress, coping and furnace burned out ,  has axe throwing working with daughter ; patient enjoys ax throwing.  Patient reported to me symptoms of anxiety and depression no suicidal ideation.  He reportedly dietary indiscretions including sweets carbohydrates and soda.  He also reports me he almost lost his job, his job is being relocated to a different state fortunately they were able to change him to a new position and therefore he would not need to be relocated.  No chest pain no shortness of breath no racing heart the patient reports me compliant taking medications without untoward side effects the.  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  PHQ-2/9 Depression Screening    Little interest or pleasure in  doing things: 3 - nearly every day  Feeling down, depressed, or hopeless: 3 - nearly every day  Trouble falling or staying asleep, or sleeping too much: 0 - not at all  Feeling tired or having little energy: 0 - not at all  Poor appetite or overeatin - several days  Feeling bad about yourself - or that you are a failure or have let yourself or your family down: 3 - nearly every day  Trouble concentrating on things, such as reading the newspaper or watching television: 0 - not at all  Moving or speaking so slowly that other people could have noticed. Or the opposite - being so fidgety or restless that you have been moving around a lot more than usual: 0 - not at all  Thoughts that you would be better off dead, or of hurting yourself in some way: 0 - not at all  PHQ-9 Score: 10   PHQ-9 Interpretation: Moderate depression         The following portions of the patient's history were reviewed and updated as appropriate: allergies, current medications, past family history, past medical history, past social history, past surgical history and problem list.    Review of Systems   Constitutional:  Negative for activity change, appetite change and unexpected weight change.   HENT:  Negative for congestion and postnasal drip.    Eyes:  Negative for visual disturbance.   Respiratory:  Negative for cough and shortness of breath.    Cardiovascular:  Negative for chest pain.   Gastrointestinal:  Negative for abdominal pain, diarrhea, nausea and vomiting.   Neurological:  Negative for dizziness, light-headedness and headaches.   Hematological:  Negative for adenopathy.   Psychiatric/Behavioral:  Negative for suicidal ideas. The patient is nervous/anxious.          Objective:    Return in about 2 months (around 2024).    No results found.      No Known Allergies    Past Medical History:   Diagnosis Date   • Anxiety    • Colon polyp    • Depression    • Diabetes mellitus (HCC)    • Disease of thyroid gland     hypothyroid  graves disease treated with radiation   • Hyperlipidemia    • Hypertension    • Hyperthyroidism     L.A.....6/8/14    R....9/10/14     • Irregular heart beat    • Obesity    • Pollen allergy 2014   • Sleep apnea     uses C-Pap machine   • Venous insufficiency     L.A.....10/14/16   R.....1/20/17       Past Surgical History:   Procedure Laterality Date   • CHOLECYSTECTOMY     • GALLBLADDER SURGERY      Anastomosis   • MD COLONOSCOPY FLX DX W/COLLJ SPEC WHEN PFRMD N/A 7/19/2017    Procedure: COLONOSCOPY;  Surgeon: Stephan Lay MD;  Location: [facility];  Service: Gastroenterology   • UMBILICAL HERNIA REPAIR     • VASCULAR SURGERY     • VASECTOMY      vas deferens      Current Outpatient Medications on File Prior to Visit   Medication Sig Dispense Refill   • busPIRone (BUSPAR) 7.5 mg tablet TAKE 1 TABLET (7.5 MG TOTAL) BY MOUTH DAILY AS NEEDED (ANXIETY) 90 tablet 1   • gabapentin (NEURONTIN) 300 mg capsule TAKE 2 CAPSULES (600 MG TOTAL) BY MOUTH DAILY AT BEDTIME 180 capsule 0   • Lancets (OneTouch Delica Plus [id number]) MISC 1 each by Does not apply route daily 100 each 1   • levothyroxine 137 mcg tablet TAKE 1 TABLET BY MOUTH EVERY DAY 90 tablet 1   • metFORMIN (GLUCOPHAGE) 500 mg tablet Take 1 tablet (500 mg total) by mouth 2 (two) times a day with meals 180 tablet 1   • OneTouch Verio test strip USE AS INSTRUCTED 100 each 1   • rizatriptan (MAXALT) 10 MG tablet Take 1 tablet (10 mg total) by mouth once as needed for migraine May repeat in 2 hours if needed 27 tablet 1   • rosuvastatin (CRESTOR) 5 mg tablet TAKE 1 TABLET (5 MG TOTAL) BY MOUTH DAILY. 90 tablet 1   • semaglutide, 0.25 or 0.5 mg/dose, (Ozempic, 0.25 or 0.5 MG/DOSE,) 2 mg/1.5 mL injection pen Inject 0.38 mL (0.5 mg total) under the skin once a week 4.5 mL 0   • temazepam (RESTORIL) 30 mg capsule Take 1 capsule (30 mg total) by mouth daily at bedtime as needed for sleep 30 capsule 0   • clotrimazole (LOTRIMIN) 1 % cream Apply topically 2 (two) times a  "day for 28 days 60 g 0   • econazole nitrate 1 % cream Apply topically daily for 10 days 15 g 0   • [DISCONTINUED] lisinopril (ZESTRIL) 20 mg tablet Take 1 tablet (20 mg total) by mouth daily (Patient not taking: Reported on 12/29/2023) 90 tablet 1     No current facility-administered medications on file prior to visit.     Family History   Problem Relation Age of Onset   • Lung cancer Mother    • Prostate cancer Family      Social History     Socioeconomic History   • Marital status: /Civil Union     Spouse name: Not on file   • Number of children: Not on file   • Years of education: Not on file   • Highest education level: Not on file   Occupational History     Comment: job physical requirements heavy lifting   Tobacco Use   • Smoking status: Never   • Smokeless tobacco: Never   Vaping Use   • Vaping status: Never Used   Substance and Sexual Activity   • Alcohol use: Not Currently     Comment: 1 can per month   • Drug use: No   • Sexual activity: Yes     Partners: Female     Birth control/protection: Male Sterilization   Other Topics Concern   • Not on file   Social History Narrative    Caffeine use    Work related stress     Social Determinants of Health     Financial Resource Strain: Not on file   Food Insecurity: Not on file   Transportation Needs: Not on file   Physical Activity: Not on file   Stress: Not on file   Social Connections: Not on file   Intimate Partner Violence: Not on file   Housing Stability: Not on file     Vitals:    12/29/23 0933   BP: 124/84   Pulse: 73   Resp: 16   SpO2: 96%   Weight: 112 kg (246 lb)   Height: 6' 2\" (1.88 m)     Results for orders placed or performed in visit on 06/19/23   TSH, 3rd generation with Free T4 reflex   Result Value Ref Range    TSH W/RFX TO FREE T4 1.27 0.40 - 4.50 mIU/L     Weight (last 2 days)     None        Body mass index is 31.58 kg/m².  BP      Temp      Pulse     Resp      SpO2        Vitals:    12/29/23 0933   Weight: 112 kg (246 lb)     Vitals: " "   12/29/23 0933   Weight: 112 kg (246 lb)       /84   Pulse 73   Resp 16   Ht 6' 2\" (1.88 m)   Wt 112 kg (246 lb)   SpO2 96%   BMI 31.58 kg/m²          Physical Exam  Vitals and nursing note reviewed.   Constitutional:       General: He is not in acute distress.     Appearance: Normal appearance. He is well-developed. He is obese. He is not ill-appearing, toxic-appearing or diaphoretic.   HENT:      Head: Normocephalic and atraumatic.      Right Ear: External ear normal.      Left Ear: External ear normal.   Eyes:      General: No scleral icterus.        Right eye: No discharge.         Left eye: No discharge.      Conjunctiva/sclera: Conjunctivae normal.      Pupils: Pupils are equal, round, and reactive to light.   Cardiovascular:      Rate and Rhythm: Normal rate and regular rhythm.      Heart sounds: Normal heart sounds. No murmur heard.     No friction rub. No gallop.   Pulmonary:      Effort: No respiratory distress.      Breath sounds: No wheezing or rales.   Abdominal:      General: Bowel sounds are normal. There is no distension.      Palpations: Abdomen is soft. There is no mass.      Tenderness: There is no abdominal tenderness. There is no guarding or rebound.   Musculoskeletal:         General: No deformity.      Cervical back: Neck supple.   Lymphadenopathy:      Cervical: No cervical adenopathy.   Neurological:      Mental Status: He is alert.   Psychiatric:         Mood and Affect: Mood is anxious and depressed.         Thought Content: Thought content does not include suicidal ideation.         "

## 2024-01-01 NOTE — ASSESSMENT & PLAN NOTE
Lab Results   Component Value Date    HGBA1C 5.4 05/12/2023   I have counselled the pt to follow a healthy and balanced diet ,and recommend routine exercise.  Patient will be going for his diabetic labs orders are in the chart he did not complete his labs at this point he reports he will go next several weeks.  Also we had a long conversation today about diet, exercise, weight loss and getting back on track with his lifestyle management patient is very motivated to make changes.  He has agreed to try his best.

## 2024-01-01 NOTE — ASSESSMENT & PLAN NOTE
Obesity -I have counseled patient following healthy and balanced diet, I would like the patient to lose weight, I would like the patient exercise routinely; we will continue monitor the patient's progress.  Patient does report to me dietary indiscretion I have discussed with the patient at length improving his diet reducing carbohydrates and sweets discontinuing soda and routine exercise

## 2024-01-01 NOTE — ASSESSMENT & PLAN NOTE
Moderate major depression no suicidal ideation patient declines psychiatry consultation he has been intolerant to multiple SSRIs in the past Rx for Wellbutrin  mg 1 tab once daily reviewed the risk benefits and side effects of the medication including 1 in 1000 risk of seizure

## 2024-01-01 NOTE — ASSESSMENT & PLAN NOTE
Hypothyroidism controlled the patient is currently euthyroid I will be ordering a TSH prior to the next office visit and the patient will continue with current medical regiment; we will continue to monitor the patient's progress.

## 2024-01-01 NOTE — ASSESSMENT & PLAN NOTE
Hyperlipidemia controlled continue with current medical regiment recommend a low-cholesterol diet and recommend routine exercise we will continue to monitor the progress.

## 2024-01-20 DIAGNOSIS — F32.A DEPRESSION, UNSPECIFIED DEPRESSION TYPE: ICD-10-CM

## 2024-01-22 RX ORDER — BUPROPION HYDROCHLORIDE 150 MG/1
150 TABLET ORAL DAILY
Qty: 90 TABLET | Refills: 1 | Status: SHIPPED | OUTPATIENT
Start: 2024-01-22

## 2024-02-09 LAB
ALBUMIN SERPL-MCNC: 4.4 G/DL (ref 3.6–5.1)
ALBUMIN/CREAT UR: 4 MCG/MG CREAT
ALBUMIN/GLOB SERPL: 1.9 (CALC) (ref 1–2.5)
ALP SERPL-CCNC: 69 U/L (ref 35–144)
ALT SERPL-CCNC: 14 U/L (ref 9–46)
AST SERPL-CCNC: 14 U/L (ref 10–35)
BILIRUB SERPL-MCNC: 0.7 MG/DL (ref 0.2–1.2)
BUN SERPL-MCNC: 20 MG/DL (ref 7–25)
BUN/CREAT SERPL: ABNORMAL (CALC) (ref 6–22)
CALCIUM SERPL-MCNC: 9 MG/DL (ref 8.6–10.3)
CHLORIDE SERPL-SCNC: 103 MMOL/L (ref 98–110)
CHOLEST SERPL-MCNC: 130 MG/DL
CHOLEST/HDLC SERPL: 3.8 (CALC)
CO2 SERPL-SCNC: 30 MMOL/L (ref 20–32)
CREAT SERPL-MCNC: 1.28 MG/DL (ref 0.7–1.3)
CREAT UR-MCNC: 224 MG/DL (ref 20–320)
GFR/BSA.PRED SERPLBLD CYS-BASED-ARV: 65 ML/MIN/1.73M2
GLOBULIN SER CALC-MCNC: 2.3 G/DL (CALC) (ref 1.9–3.7)
GLUCOSE SERPL-MCNC: 103 MG/DL (ref 65–99)
HBA1C MFR BLD: 5.5 % OF TOTAL HGB
HDLC SERPL-MCNC: 34 MG/DL
LDLC SERPL CALC-MCNC: 78 MG/DL (CALC)
MICROALBUMIN UR-MCNC: 0.8 MG/DL
NONHDLC SERPL-MCNC: 96 MG/DL (CALC)
POTASSIUM SERPL-SCNC: 3.8 MMOL/L (ref 3.5–5.3)
PROT SERPL-MCNC: 6.7 G/DL (ref 6.1–8.1)
SODIUM SERPL-SCNC: 140 MMOL/L (ref 135–146)
TRIGL SERPL-MCNC: 97 MG/DL
TSH SERPL-ACNC: 3.52 MIU/L (ref 0.4–4.5)
VZV IGG SER IA-ACNC: <135 INDEX

## 2024-02-29 DIAGNOSIS — E11.8 TYPE 2 DIABETES MELLITUS WITH UNSPECIFIED COMPLICATIONS (HCC): ICD-10-CM

## 2024-02-29 DIAGNOSIS — F51.01 PRIMARY INSOMNIA: ICD-10-CM

## 2024-03-01 RX ORDER — TEMAZEPAM 30 MG/1
30 CAPSULE ORAL
Qty: 30 CAPSULE | Refills: 0 | Status: SHIPPED | OUTPATIENT
Start: 2024-03-01

## 2024-03-04 DIAGNOSIS — F51.01 PRIMARY INSOMNIA: ICD-10-CM

## 2024-03-04 DIAGNOSIS — G43.809 OTHER MIGRAINE WITHOUT STATUS MIGRAINOSUS, NOT INTRACTABLE: ICD-10-CM

## 2024-03-04 DIAGNOSIS — E11.8 TYPE 2 DIABETES MELLITUS WITH UNSPECIFIED COMPLICATIONS (HCC): ICD-10-CM

## 2024-03-04 DIAGNOSIS — E11.9 TYPE 2 DIABETES MELLITUS WITHOUT COMPLICATION, WITHOUT LONG-TERM CURRENT USE OF INSULIN (HCC): ICD-10-CM

## 2024-03-05 RX ORDER — TEMAZEPAM 30 MG/1
30 CAPSULE ORAL
Qty: 30 CAPSULE | Refills: 0 | OUTPATIENT
Start: 2024-03-05

## 2024-03-05 RX ORDER — RIZATRIPTAN BENZOATE 10 MG/1
10 TABLET ORAL ONCE AS NEEDED
Qty: 27 TABLET | Refills: 0 | Status: SHIPPED | OUTPATIENT
Start: 2024-03-05

## 2024-04-19 ENCOUNTER — TELEPHONE (OUTPATIENT)
Dept: INTERNAL MEDICINE CLINIC | Facility: CLINIC | Age: 59
End: 2024-04-19

## 2024-04-19 DIAGNOSIS — E11.9 TYPE 2 DIABETES MELLITUS WITHOUT COMPLICATION, WITHOUT LONG-TERM CURRENT USE OF INSULIN (HCC): ICD-10-CM

## 2024-04-19 NOTE — TELEPHONE ENCOUNTER
Patient called the RX Refill Line. Message is being forwarded to the office.     Patient is requesting semaglutide, 0.25 or 0.5 mg/dose, (Ozempic, 0.25 or 0.5 MG/DOSE,) 2 mg/1.5 mL injection pen   Patient stated that he normally gets 3 pens, 90 days supply. Pharmacy told Patient that he needs a new script stating that he gets 3 pens at a time so he pays less. Patient also stated that they did have 1 pen but would not release it to the Patient. Please advice.    Please contact patient at [phone]

## 2024-04-22 DIAGNOSIS — E11.9 TYPE 2 DIABETES MELLITUS WITHOUT COMPLICATION, WITHOUT LONG-TERM CURRENT USE OF INSULIN (HCC): ICD-10-CM

## 2024-04-22 NOTE — TELEPHONE ENCOUNTER
Call placed to patient to see if he was able to  his medication, unable to leave message. Will await call back and try again.

## 2024-04-23 ENCOUNTER — TELEPHONE (OUTPATIENT)
Age: 59
End: 2024-04-23

## 2024-04-23 RX ORDER — SEMAGLUTIDE 0.68 MG/ML
INJECTION, SOLUTION SUBCUTANEOUS
Qty: 9 ML | Refills: 1 | Status: SHIPPED | OUTPATIENT
Start: 2024-04-23

## 2024-04-23 NOTE — TELEPHONE ENCOUNTER
Charles from [facility] pharmacy called to verify patient is to be getting a 90 day supply of Ozempic 9 ml states patient always gets 90 days. Ok to fill for 90 days

## 2024-05-30 DIAGNOSIS — E11.8 TYPE 2 DIABETES MELLITUS WITH UNSPECIFIED COMPLICATIONS (HCC): ICD-10-CM

## 2024-06-21 DIAGNOSIS — E78.5 HYPERLIPIDEMIA, UNSPECIFIED HYPERLIPIDEMIA TYPE: ICD-10-CM

## 2024-06-21 DIAGNOSIS — E03.9 HYPOTHYROIDISM, UNSPECIFIED TYPE: ICD-10-CM

## 2024-06-21 DIAGNOSIS — F41.9 ANXIETY: ICD-10-CM

## 2024-06-21 RX ORDER — LEVOTHYROXINE SODIUM 137 UG/1
TABLET ORAL
Qty: 90 TABLET | Refills: 1 | Status: SHIPPED | OUTPATIENT
Start: 2024-06-21

## 2024-06-21 RX ORDER — BUSPIRONE HYDROCHLORIDE 7.5 MG/1
7.5 TABLET ORAL DAILY PRN
Qty: 90 TABLET | Refills: 1 | Status: SHIPPED | OUTPATIENT
Start: 2024-06-21

## 2024-06-21 RX ORDER — ROSUVASTATIN CALCIUM 5 MG/1
5 TABLET, COATED ORAL DAILY
Qty: 90 TABLET | Refills: 1 | Status: SHIPPED | OUTPATIENT
Start: 2024-06-21

## 2024-06-22 DIAGNOSIS — G43.809 OTHER MIGRAINE WITHOUT STATUS MIGRAINOSUS, NOT INTRACTABLE: ICD-10-CM

## 2024-06-22 RX ORDER — RIZATRIPTAN BENZOATE 10 MG/1
10 TABLET ORAL ONCE AS NEEDED
Qty: 27 TABLET | Refills: 0 | Status: SHIPPED | OUTPATIENT
Start: 2024-06-22

## 2024-07-02 ENCOUNTER — OFFICE VISIT (OUTPATIENT)
Dept: INTERNAL MEDICINE CLINIC | Facility: CLINIC | Age: 59
End: 2024-07-02
Payer: COMMERCIAL

## 2024-07-02 VITALS
BODY MASS INDEX: 31.32 KG/M2 | HEART RATE: 78 BPM | DIASTOLIC BLOOD PRESSURE: 80 MMHG | WEIGHT: 244 LBS | HEIGHT: 74 IN | SYSTOLIC BLOOD PRESSURE: 130 MMHG | OXYGEN SATURATION: 96 %

## 2024-07-02 DIAGNOSIS — Z00.00 WELLNESS EXAMINATION: Primary | ICD-10-CM

## 2024-07-02 DIAGNOSIS — Z23 ENCOUNTER FOR IMMUNIZATION: ICD-10-CM

## 2024-07-02 PROCEDURE — 99396 PREV VISIT EST AGE 40-64: CPT | Performed by: INTERNAL MEDICINE

## 2024-07-02 NOTE — PATIENT INSTRUCTIONS
Problem List Items Addressed This Visit          Other    Wellness examination - Primary     Discussed preventative health, cancer screening, immunizations, and safety issues.  Patient had tetanus booster January 16, 2019.  Last colonoscopy November 20, 2022 with recommendations recheck again in 5 years.  I recommend yearly flu shot.    I recommend getting the Shingrix shot to help prevent Shingles.  You can get it a pharmacy, and they can administer it there.  It is a two shot series with the second shot needed between 2-6 months after the first shot.  I would not recommend getting the shot before an important or fun event in case you were to have a reaction to the shot like a sore arm or flu-like symptoms.  I make the same recommendation about any shot, as people can have a reaction to any shot.          Other Visit Diagnoses       Encounter for immunization

## 2024-07-02 NOTE — PROGRESS NOTES
Ambulatory Visit  Name: Frank Du      : 1965      MRN: [id number]  Encounter Provider: Qunin Valle MD  Encounter Date: 2024   Encounter department: [facility]    Assessment & Plan   1. Wellness examination  Assessment & Plan:  Discussed preventative health, cancer screening, immunizations, and safety issues.  Patient had tetanus booster 2019.  Last colonoscopy 2022 with recommendations recheck again in 5 years.  I recommend yearly flu shot.    I recommend getting the Shingrix shot to help prevent Shingles.  You can get it a pharmacy, and they can administer it there.  It is a two shot series with the second shot needed between 2-6 months after the first shot.  I would not recommend getting the shot before an important or fun event in case you were to have a reaction to the shot like a sore arm or flu-like symptoms.  I make the same recommendation about any shot, as people can have a reaction to any shot.  2. Encounter for immunization         History of Present Illness     I am seeing patient in coverage for their PCP today.  Patient is coming in for wellness exam.    Patient eats a healthy diet, exercises, no smoking cigarettes, gets routine dental care      Review of Systems   Constitutional:  Negative for chills, fatigue and fever.   HENT:  Negative for congestion, nosebleeds, postnasal drip, sore throat and trouble swallowing.    Eyes:  Negative for pain.   Respiratory:  Negative for cough, chest tightness, shortness of breath and wheezing.    Cardiovascular:  Negative for chest pain, palpitations and leg swelling.   Gastrointestinal:  Negative for abdominal pain, constipation, diarrhea, nausea and vomiting.   Endocrine: Negative for polydipsia and polyuria.   Genitourinary:  Negative for dysuria, flank pain and hematuria.   Musculoskeletal:  Negative for arthralgias.   Skin:  Negative for rash.   Neurological:  Negative for dizziness,  tremors, light-headedness and headaches.   Hematological:  Does not bruise/bleed easily.   Psychiatric/Behavioral:  Negative for confusion and dysphoric mood. The patient is not nervous/anxious.      Past Medical History:   Diagnosis Date   • Anxiety    • Colon polyp    • Depression    • Diabetes mellitus (HCC)    • Disease of thyroid gland     hypothyroid graves disease treated with radiation   • Hyperlipidemia    • Hypertension    • Hyperthyroidism     L.A.....6/8/14    R....9/10/14     • Irregular heart beat    • Obesity    • Pollen allergy 2014   • Sleep apnea     uses C-Pap machine   • Venous insufficiency     L.A.....10/14/16   R.....1/20/17       Past Surgical History:   Procedure Laterality Date   • CHOLECYSTECTOMY     • GALLBLADDER SURGERY      Anastomosis   • AL COLONOSCOPY FLX DX W/COLLJ SPEC WHEN PFRMD N/A 7/19/2017    Procedure: COLONOSCOPY;  Surgeon: Stephan Lay MD;  Location: [facility];  Service: Gastroenterology   • UMBILICAL HERNIA REPAIR     • VASCULAR SURGERY     • VASECTOMY      vas deferens      Family History   Problem Relation Age of Onset   • Lung cancer Mother    • Prostate cancer Family      Social History     Tobacco Use   • Smoking status: Never   • Smokeless tobacco: Never   Vaping Use   • Vaping status: Never Used   Substance and Sexual Activity   • Alcohol use: Not Currently     Comment: 1 can per month   • Drug use: No   • Sexual activity: Yes     Partners: Female     Birth control/protection: Male Sterilization     Current Outpatient Medications on File Prior to Visit   Medication Sig   • buPROPion (WELLBUTRIN XL) 150 mg 24 hr tablet TAKE 1 TABLET BY MOUTH EVERY DAY   • busPIRone (BUSPAR) 7.5 mg tablet TAKE 1 TABLET (7.5 MG TOTAL) BY MOUTH DAILY AS NEEDED (ANXIETY)   • Lancets (OneTouch Delica Plus [id number]) MISC 1 each by Does not apply route daily   • levothyroxine 137 mcg tablet TAKE 1 TABLET BY MOUTH EVERY DAY   • metFORMIN (GLUCOPHAGE) 500 mg tablet TAKE 1 TABLET BY MOUTH  "TWICE A DAY WITH MEALS   • OneTouch Verio test strip USE AS INSTRUCTED   • rizatriptan (MAXALT) 10 mg tablet TAKE 1 TABLET (10 MG TOTAL) BY MOUTH ONCE AS NEEDED FOR MIGRAINE MAY REPEAT IN 2 HOURS IF NEEDED   • rosuvastatin (CRESTOR) 5 mg tablet TAKE 1 TABLET (5 MG TOTAL) BY MOUTH DAILY.   • semaglutide, 0.25 or 0.5 mg/dose, (Ozempic, 0.25 or 0.5 MG/DOSE,) 2 mg/3 mL injection pen INJECT 0.38 ML (0.5 MG TOTAL) UNDER THE SKIN ONCE A WEEK   • temazepam (RESTORIL) 30 mg capsule Take 1 capsule (30 mg total) by mouth daily at bedtime as needed for sleep   • clotrimazole (LOTRIMIN) 1 % cream Apply topically 2 (two) times a day for 28 days   • econazole nitrate 1 % cream Apply topically daily for 10 days   • gabapentin (NEURONTIN) 300 mg capsule TAKE 2 CAPSULES (600 MG TOTAL) BY MOUTH DAILY AT BEDTIME (Patient not taking: Reported on 7/2/2024)     No Known Allergies  Immunization History   Administered Date(s) Administered   • COVID-19 PFIZER VACCINE 0.3 ML IM 04/22/2021, 05/13/2021, 11/13/2021   • COVID-19 Pfizer Vac BIVALENT Chris-sucrose 12 Yr+ IM 11/03/2022   • COVID-19 Pfizer vac (Chris-sucrose, gray cap) 12 yr+ IM 04/27/2022   • Influenza Quadrivalent Preservative Free 3 years and older IM 09/27/2017   • Influenza, injectable, quadrivalent, preservative free 0.5 mL 08/27/2020, 12/29/2023   • Influenza, recombinant, quadrivalent,injectable, preservative free 10/04/2018, 11/04/2019, 10/07/2021   • Pneumococcal Polysaccharide PPV23 02/14/2017   • TD (adult) Preservative Free 01/16/2019   • Td (adult), adsorbed 01/16/2019   • Tdap 10/13/2008     Objective     /80   Pulse 78   Ht 6' 2\" (1.88 m)   Wt 111 kg (244 lb)   SpO2 96%   BMI 31.33 kg/m²     Physical Exam  Vitals reviewed.   Constitutional:       General: He is not in acute distress.     Appearance: Normal appearance. He is well-developed.   HENT:      Head: Normocephalic and atraumatic.      Right Ear: External ear normal.      Left Ear: External ear normal. "      Nose: Nose normal.   Eyes:      General: No scleral icterus.     Conjunctiva/sclera: Conjunctivae normal.   Neck:      Trachea: No tracheal deviation.   Cardiovascular:      Rate and Rhythm: Normal rate and regular rhythm.      Heart sounds: Normal heart sounds. No murmur heard.  Pulmonary:      Effort: Pulmonary effort is normal. No respiratory distress.      Breath sounds: Normal breath sounds. No wheezing or rales.   Abdominal:      General: Bowel sounds are normal.      Palpations: Abdomen is soft. There is no mass.      Tenderness: There is no abdominal tenderness. There is no guarding.      Hernia: There is no hernia in the left inguinal area or right inguinal area.   Genitourinary:     Testes: Normal.   Musculoskeletal:      Cervical back: Normal range of motion and neck supple.      Right lower leg: No edema.      Left lower leg: No edema.   Lymphadenopathy:      Cervical: No cervical adenopathy.   Skin:     Coloration: Skin is not jaundiced.   Neurological:      General: No focal deficit present.      Mental Status: He is alert and oriented to person, place, and time.   Psychiatric:         Mood and Affect: Mood normal.         Behavior: Behavior normal.         Thought Content: Thought content normal.         Judgment: Judgment normal.       Administrative Statements

## 2024-07-02 NOTE — ASSESSMENT & PLAN NOTE
Discussed preventative health, cancer screening, immunizations, and safety issues.  Patient had tetanus booster January 16, 2019.  Last colonoscopy November 20, 2022 with recommendations recheck again in 5 years.  I recommend yearly flu shot.    I recommend getting the Shingrix shot to help prevent Shingles.  You can get it a pharmacy, and they can administer it there.  It is a two shot series with the second shot needed between 2-6 months after the first shot.  I would not recommend getting the shot before an important or fun event in case you were to have a reaction to the shot like a sore arm or flu-like symptoms.  I make the same recommendation about any shot, as people can have a reaction to any shot.

## 2024-07-20 DIAGNOSIS — E11.9 TYPE 2 DIABETES MELLITUS WITHOUT COMPLICATION, WITHOUT LONG-TERM CURRENT USE OF INSULIN (HCC): ICD-10-CM

## 2024-07-20 RX ORDER — SEMAGLUTIDE 0.68 MG/ML
INJECTION, SOLUTION SUBCUTANEOUS
Qty: 9 ML | Refills: 1 | Status: SHIPPED | OUTPATIENT
Start: 2024-07-20

## 2024-07-25 DIAGNOSIS — F32.A DEPRESSION, UNSPECIFIED DEPRESSION TYPE: ICD-10-CM

## 2024-07-25 RX ORDER — BUPROPION HYDROCHLORIDE 150 MG/1
150 TABLET ORAL DAILY
Qty: 90 TABLET | Refills: 1 | Status: SHIPPED | OUTPATIENT
Start: 2024-07-25

## 2024-10-16 DIAGNOSIS — E11.8 TYPE 2 DIABETES MELLITUS WITH UNSPECIFIED COMPLICATIONS (HCC): ICD-10-CM

## 2024-12-10 DIAGNOSIS — F51.01 PRIMARY INSOMNIA: ICD-10-CM

## 2024-12-10 DIAGNOSIS — E11.8 TYPE 2 DIABETES MELLITUS WITH UNSPECIFIED COMPLICATIONS (HCC): ICD-10-CM

## 2024-12-11 RX ORDER — TEMAZEPAM 30 MG/1
30 CAPSULE ORAL
Qty: 30 CAPSULE | Refills: 0 | Status: SHIPPED | OUTPATIENT
Start: 2024-12-11

## 2024-12-26 DIAGNOSIS — F32.A DEPRESSION, UNSPECIFIED DEPRESSION TYPE: ICD-10-CM

## 2024-12-26 DIAGNOSIS — E03.9 HYPOTHYROIDISM, UNSPECIFIED TYPE: ICD-10-CM

## 2024-12-26 DIAGNOSIS — F41.9 ANXIETY: ICD-10-CM

## 2024-12-26 DIAGNOSIS — E78.5 HYPERLIPIDEMIA, UNSPECIFIED HYPERLIPIDEMIA TYPE: ICD-10-CM

## 2024-12-26 RX ORDER — BUPROPION HYDROCHLORIDE 150 MG/1
150 TABLET ORAL DAILY
Qty: 90 TABLET | Refills: 1 | Status: SHIPPED | OUTPATIENT
Start: 2024-12-26

## 2024-12-26 RX ORDER — LEVOTHYROXINE SODIUM 137 UG/1
137 TABLET ORAL DAILY
Qty: 90 TABLET | Refills: 1 | Status: SHIPPED | OUTPATIENT
Start: 2024-12-26

## 2024-12-26 RX ORDER — BUSPIRONE HYDROCHLORIDE 7.5 MG/1
7.5 TABLET ORAL DAILY PRN
Qty: 90 TABLET | Refills: 1 | Status: SHIPPED | OUTPATIENT
Start: 2024-12-26

## 2024-12-26 RX ORDER — ROSUVASTATIN CALCIUM 5 MG/1
5 TABLET, COATED ORAL DAILY
Qty: 90 TABLET | Refills: 1 | Status: SHIPPED | OUTPATIENT
Start: 2024-12-26

## 2025-01-08 DIAGNOSIS — E11.8 TYPE 2 DIABETES MELLITUS WITH UNSPECIFIED COMPLICATIONS (HCC): ICD-10-CM

## 2025-01-15 DIAGNOSIS — E11.9 TYPE 2 DIABETES MELLITUS WITHOUT COMPLICATION, WITHOUT LONG-TERM CURRENT USE OF INSULIN (HCC): ICD-10-CM

## 2025-01-15 RX ORDER — SEMAGLUTIDE 0.68 MG/ML
INJECTION, SOLUTION SUBCUTANEOUS
Qty: 9 ML | Refills: 0 | Status: SHIPPED | OUTPATIENT
Start: 2025-01-15

## 2025-01-16 ENCOUNTER — TELEPHONE (OUTPATIENT)
Age: 60
End: 2025-01-16

## 2025-01-16 DIAGNOSIS — E11.9 TYPE 2 DIABETES MELLITUS WITHOUT COMPLICATION, WITHOUT LONG-TERM CURRENT USE OF INSULIN (HCC): ICD-10-CM

## 2025-01-16 NOTE — TELEPHONE ENCOUNTER
Pharmacist called, asked to correct the direction on Rx Ozempic. The direction says tp inject 0,38 ml 0,5 mg total). Please follow up.

## 2025-01-21 RX ORDER — SEMAGLUTIDE 0.68 MG/ML
INJECTION, SOLUTION SUBCUTANEOUS
Refills: 0 | OUTPATIENT
Start: 2025-01-21

## 2025-01-23 NOTE — TELEPHONE ENCOUNTER
LM x 2 to call back to schedule and we also need to verify his current dose of the Ozempic. Patient was sent a [employer] message also.

## 2025-03-13 DIAGNOSIS — E78.5 HYPERLIPIDEMIA, UNSPECIFIED HYPERLIPIDEMIA TYPE: ICD-10-CM

## 2025-03-13 DIAGNOSIS — E03.9 HYPOTHYROIDISM, UNSPECIFIED TYPE: ICD-10-CM

## 2025-03-13 DIAGNOSIS — E11.8 TYPE 2 DIABETES MELLITUS WITH UNSPECIFIED COMPLICATIONS (HCC): ICD-10-CM

## 2025-03-14 RX ORDER — LEVOTHYROXINE SODIUM 137 UG/1
137 TABLET ORAL DAILY
Qty: 90 TABLET | Refills: 0 | Status: SHIPPED | OUTPATIENT
Start: 2025-03-14

## 2025-03-14 RX ORDER — ROSUVASTATIN CALCIUM 5 MG/1
5 TABLET, COATED ORAL DAILY
Qty: 90 TABLET | Refills: 0 | Status: SHIPPED | OUTPATIENT
Start: 2025-03-14

## 2025-04-11 ENCOUNTER — TELEPHONE (OUTPATIENT)
Dept: INTERNAL MEDICINE CLINIC | Facility: CLINIC | Age: 60
End: 2025-04-11

## 2025-04-11 NOTE — TELEPHONE ENCOUNTER
Called patient to see if he had a diabetic foot exam done recently so we can update his chart or to see if he would like to come in for a visit to get that done

## 2025-05-22 ENCOUNTER — TELEPHONE (OUTPATIENT)
Dept: INTERNAL MEDICINE CLINIC | Facility: CLINIC | Age: 60
End: 2025-05-22

## 2025-05-22 NOTE — TELEPHONE ENCOUNTER
Called patient to schedule diabetic management testing, no answer, could not leave voicemail as the voicemail box was full

## 2025-08-06 ENCOUNTER — NURSE TRIAGE (OUTPATIENT)
Age: 60
End: 2025-08-06

## 2025-08-07 ENCOUNTER — OFFICE VISIT (OUTPATIENT)
Dept: INTERNAL MEDICINE CLINIC | Facility: CLINIC | Age: 60
End: 2025-08-07
Payer: COMMERCIAL

## 2025-08-07 VITALS
BODY MASS INDEX: 30.81 KG/M2 | SYSTOLIC BLOOD PRESSURE: 150 MMHG | DIASTOLIC BLOOD PRESSURE: 100 MMHG | TEMPERATURE: 97.7 F | WEIGHT: 240 LBS | OXYGEN SATURATION: 97 % | HEART RATE: 67 BPM

## 2025-08-07 DIAGNOSIS — E11.8 TYPE 2 DIABETES MELLITUS WITH UNSPECIFIED COMPLICATIONS (HCC): ICD-10-CM

## 2025-08-07 DIAGNOSIS — E03.9 HYPOTHYROIDISM, UNSPECIFIED TYPE: ICD-10-CM

## 2025-08-07 DIAGNOSIS — Z12.5 SCREENING FOR PROSTATE CANCER: ICD-10-CM

## 2025-08-07 DIAGNOSIS — I10 PRIMARY HYPERTENSION: Primary | ICD-10-CM

## 2025-08-07 DIAGNOSIS — Z11.4 SCREENING FOR HIV (HUMAN IMMUNODEFICIENCY VIRUS): ICD-10-CM

## 2025-08-07 DIAGNOSIS — E11.9 TYPE 2 DIABETES MELLITUS WITHOUT COMPLICATION, WITHOUT LONG-TERM CURRENT USE OF INSULIN (HCC): ICD-10-CM

## 2025-08-07 LAB
CREAT UR-MCNC: 170.9 MG/DL
LEFT EYE DIABETIC RETINOPATHY: NORMAL
LEFT EYE IMAGE QUALITY: NORMAL
LEFT EYE MACULAR EDEMA: NORMAL
LEFT EYE OTHER RETINOPATHY: NORMAL
MICROALBUMIN UR-MCNC: 14.6 MG/L
MICROALBUMIN/CREAT 24H UR: 9 MG/G CREATININE (ref 0–30)
RIGHT EYE DIABETIC RETINOPATHY: NORMAL
RIGHT EYE IMAGE QUALITY: NORMAL
RIGHT EYE MACULAR EDEMA: NORMAL
RIGHT EYE OTHER RETINOPATHY: NORMAL
SEVERITY (EYE EXAM): NORMAL
SL AMB POCT HEMOGLOBIN AIC: 6 (ref ?–6.5)

## 2025-08-07 PROCEDURE — 99214 OFFICE O/P EST MOD 30 MIN: CPT | Performed by: INTERNAL MEDICINE

## 2025-08-07 PROCEDURE — 83036 HEMOGLOBIN GLYCOSYLATED A1C: CPT | Performed by: INTERNAL MEDICINE

## 2025-08-07 PROCEDURE — 92250 FUNDUS PHOTOGRAPHY W/I&R: CPT | Performed by: INTERNAL MEDICINE

## 2025-08-07 RX ORDER — LISINOPRIL 20 MG/1
20 TABLET ORAL DAILY
Qty: 100 TABLET | Refills: 3 | Status: SHIPPED | OUTPATIENT
Start: 2025-08-07